# Patient Record
Sex: FEMALE | Race: BLACK OR AFRICAN AMERICAN | NOT HISPANIC OR LATINO | Employment: OTHER | URBAN - METROPOLITAN AREA
[De-identification: names, ages, dates, MRNs, and addresses within clinical notes are randomized per-mention and may not be internally consistent; named-entity substitution may affect disease eponyms.]

---

## 2018-09-24 ENCOUNTER — OFFICE VISIT CONVERTED (OUTPATIENT)
Dept: PULMONOLOGY | Facility: CLINIC | Age: 58
End: 2018-09-24
Attending: INTERNAL MEDICINE

## 2018-10-26 ENCOUNTER — OFFICE VISIT CONVERTED (OUTPATIENT)
Dept: PULMONOLOGY | Facility: CLINIC | Age: 58
End: 2018-10-26
Attending: INTERNAL MEDICINE

## 2018-11-09 ENCOUNTER — OFFICE VISIT CONVERTED (OUTPATIENT)
Dept: FAMILY MEDICINE CLINIC | Facility: CLINIC | Age: 58
End: 2018-11-09
Attending: NURSE PRACTITIONER

## 2018-11-28 ENCOUNTER — CONVERSION ENCOUNTER (OUTPATIENT)
Dept: FAMILY MEDICINE CLINIC | Facility: CLINIC | Age: 58
End: 2018-11-28

## 2018-11-28 ENCOUNTER — OFFICE VISIT CONVERTED (OUTPATIENT)
Dept: FAMILY MEDICINE CLINIC | Facility: CLINIC | Age: 58
End: 2018-11-28
Attending: NURSE PRACTITIONER

## 2019-01-28 ENCOUNTER — OFFICE VISIT CONVERTED (OUTPATIENT)
Dept: FAMILY MEDICINE CLINIC | Facility: CLINIC | Age: 59
End: 2019-01-28
Attending: NURSE PRACTITIONER

## 2019-02-08 ENCOUNTER — HOSPITAL ENCOUNTER (OUTPATIENT)
Dept: GENERAL RADIOLOGY | Facility: HOSPITAL | Age: 59
Discharge: HOME | End: 2019-02-08
Attending: INTERNAL MEDICINE

## 2019-02-14 ENCOUNTER — OFFICE VISIT CONVERTED (OUTPATIENT)
Dept: PULMONOLOGY | Facility: CLINIC | Age: 59
End: 2019-02-14
Attending: INTERNAL MEDICINE

## 2019-03-25 ENCOUNTER — OFFICE VISIT CONVERTED (OUTPATIENT)
Dept: FAMILY MEDICINE CLINIC | Facility: CLINIC | Age: 59
End: 2019-03-25
Attending: NURSE PRACTITIONER

## 2019-05-07 ENCOUNTER — OFFICE VISIT CONVERTED (OUTPATIENT)
Dept: FAMILY MEDICINE CLINIC | Facility: CLINIC | Age: 59
End: 2019-05-07
Attending: NURSE PRACTITIONER

## 2019-06-05 ENCOUNTER — OFFICE VISIT CONVERTED (OUTPATIENT)
Dept: PULMONOLOGY | Facility: CLINIC | Age: 59
End: 2019-06-05
Attending: INTERNAL MEDICINE

## 2019-06-18 ENCOUNTER — OFFICE VISIT CONVERTED (OUTPATIENT)
Dept: NEUROSURGERY | Facility: CLINIC | Age: 59
End: 2019-06-18
Attending: PHYSICIAN ASSISTANT

## 2019-06-18 ENCOUNTER — CONVERSION ENCOUNTER (OUTPATIENT)
Dept: NEUROLOGY | Facility: CLINIC | Age: 59
End: 2019-06-18

## 2019-07-31 ENCOUNTER — OFFICE VISIT CONVERTED (OUTPATIENT)
Dept: OTOLARYNGOLOGY | Facility: CLINIC | Age: 59
End: 2019-07-31
Attending: OTOLARYNGOLOGY

## 2019-08-28 ENCOUNTER — OFFICE VISIT CONVERTED (OUTPATIENT)
Dept: FAMILY MEDICINE CLINIC | Facility: CLINIC | Age: 59
End: 2019-08-28
Attending: NURSE PRACTITIONER

## 2019-09-24 ENCOUNTER — OFFICE VISIT CONVERTED (OUTPATIENT)
Dept: FAMILY MEDICINE CLINIC | Facility: CLINIC | Age: 59
End: 2019-09-24
Attending: NURSE PRACTITIONER

## 2019-10-28 ENCOUNTER — OFFICE VISIT CONVERTED (OUTPATIENT)
Dept: FAMILY MEDICINE CLINIC | Facility: CLINIC | Age: 59
End: 2019-10-28
Attending: NURSE PRACTITIONER

## 2019-12-10 ENCOUNTER — OFFICE VISIT CONVERTED (OUTPATIENT)
Dept: SURGERY | Facility: CLINIC | Age: 59
End: 2019-12-10
Attending: NURSE PRACTITIONER

## 2019-12-11 ENCOUNTER — OFFICE VISIT CONVERTED (OUTPATIENT)
Dept: PULMONOLOGY | Facility: CLINIC | Age: 59
End: 2019-12-11
Attending: INTERNAL MEDICINE

## 2020-01-03 ENCOUNTER — HOSPITAL ENCOUNTER (OUTPATIENT)
Dept: GASTROENTEROLOGY | Facility: HOSPITAL | Age: 60
Setting detail: HOSPITAL OUTPATIENT SURGERY
Discharge: HOME | End: 2020-01-03
Attending: SURGERY

## 2020-01-29 ENCOUNTER — HOSPITAL ENCOUNTER (OUTPATIENT)
Dept: FAMILY MEDICINE CLINIC | Facility: CLINIC | Age: 60
Discharge: HOME | End: 2020-01-29
Attending: NURSE PRACTITIONER

## 2020-01-29 ENCOUNTER — OFFICE VISIT CONVERTED (OUTPATIENT)
Dept: FAMILY MEDICINE CLINIC | Facility: CLINIC | Age: 60
End: 2020-01-29
Attending: NURSE PRACTITIONER

## 2020-01-31 LAB
AMOXICILLIN+CLAV SUSC ISLT: 4
AMPICILLIN SUSC ISLT: 8
AMPICILLIN+SULBAC SUSC ISLT: 4
BACTERIA UR CULT: ABNORMAL
CEFAZOLIN SUSC ISLT: <=4
CEFEPIME SUSC ISLT: <=1
CEFTAZIDIME SUSC ISLT: <=1
CEFTRIAXONE SUSC ISLT: <=1
CEFUROXIME ORAL SUSC ISLT: 4
CEFUROXIME PARENTER SUSC ISLT: 4
CIPROFLOXACIN SUSC ISLT: <=0.25
ERTAPENEM SUSC ISLT: <=0.5
GENTAMICIN SUSC ISLT: <=1
LEVOFLOXACIN SUSC ISLT: <=0.12
NITROFURANTOIN SUSC ISLT: <=16
TETRACYCLINE SUSC ISLT: <=1
TMP SMX SUSC ISLT: <=20
TOBRAMYCIN SUSC ISLT: <=1

## 2020-03-02 ENCOUNTER — OFFICE VISIT CONVERTED (OUTPATIENT)
Dept: FAMILY MEDICINE CLINIC | Facility: CLINIC | Age: 60
End: 2020-03-02
Attending: NURSE PRACTITIONER

## 2020-03-05 ENCOUNTER — OFFICE VISIT CONVERTED (OUTPATIENT)
Dept: PULMONOLOGY | Facility: CLINIC | Age: 60
End: 2020-03-05
Attending: NURSE PRACTITIONER

## 2020-03-05 ENCOUNTER — HOSPITAL ENCOUNTER (OUTPATIENT)
Dept: LAB | Facility: HOSPITAL | Age: 60
Discharge: HOME | End: 2020-03-05
Attending: NURSE PRACTITIONER

## 2020-03-05 LAB
ALBUMIN SERPL-MCNC: 4.4 G/DL (ref 3.5–5)
ALBUMIN/GLOB SERPL: 1.8 {RATIO} (ref 1.4–2.6)
ALP SERPL-CCNC: 85 U/L (ref 53–141)
ALT SERPL-CCNC: 15 U/L (ref 10–40)
ANION GAP SERPL CALC-SCNC: 16 MMOL/L (ref 8–19)
AST SERPL-CCNC: 15 U/L (ref 15–50)
BASOPHILS # BLD AUTO: 0.04 10*3/UL (ref 0–0.2)
BASOPHILS NFR BLD AUTO: 1 % (ref 0–3)
BILIRUB SERPL-MCNC: 0.19 MG/DL (ref 0.2–1.3)
BUN SERPL-MCNC: 11 MG/DL (ref 5–25)
BUN/CREAT SERPL: 11 {RATIO} (ref 6–20)
CALCIUM SERPL-MCNC: 9 MG/DL (ref 8.7–10.4)
CHLORIDE SERPL-SCNC: 105 MMOL/L (ref 99–111)
CHOLEST SERPL-MCNC: 156 MG/DL (ref 107–200)
CHOLEST/HDLC SERPL: 2.8 {RATIO} (ref 3–6)
CONV ABS IMM GRAN: 0.01 10*3/UL (ref 0–0.2)
CONV CO2: 27 MMOL/L (ref 22–32)
CONV IMMATURE GRAN: 0.2 % (ref 0–1.8)
CONV TOTAL PROTEIN: 6.9 G/DL (ref 6.3–8.2)
CREAT UR-MCNC: 0.99 MG/DL (ref 0.5–0.9)
DEPRECATED RDW RBC AUTO: 41.4 FL (ref 36.4–46.3)
EOSINOPHIL # BLD AUTO: 0.07 10*3/UL (ref 0–0.7)
EOSINOPHIL # BLD AUTO: 1.7 % (ref 0–7)
ERYTHROCYTE [DISTWIDTH] IN BLOOD BY AUTOMATED COUNT: 12.7 % (ref 11.7–14.4)
GFR SERPLBLD BASED ON 1.73 SQ M-ARVRAT: >60 ML/MIN/{1.73_M2}
GLOBULIN UR ELPH-MCNC: 2.5 G/DL (ref 2–3.5)
GLUCOSE SERPL-MCNC: 100 MG/DL (ref 65–99)
HCT VFR BLD AUTO: 37.8 % (ref 37–47)
HDLC SERPL-MCNC: 55 MG/DL (ref 40–60)
HGB BLD-MCNC: 12 G/DL (ref 12–16)
LDLC SERPL CALC-MCNC: 76 MG/DL (ref 70–100)
LYMPHOCYTES # BLD AUTO: 1.64 10*3/UL (ref 1–5)
LYMPHOCYTES NFR BLD AUTO: 39.2 % (ref 20–45)
MAGNESIUM SERPL-MCNC: 2 MG/DL (ref 1.6–2.3)
MCH RBC QN AUTO: 28.4 PG (ref 27–31)
MCHC RBC AUTO-ENTMCNC: 31.7 G/DL (ref 33–37)
MCV RBC AUTO: 89.4 FL (ref 81–99)
MONOCYTES # BLD AUTO: 0.35 10*3/UL (ref 0.2–1.2)
MONOCYTES NFR BLD AUTO: 8.4 % (ref 3–10)
NEUTROPHILS # BLD AUTO: 2.07 10*3/UL (ref 2–8)
NEUTROPHILS NFR BLD AUTO: 49.5 % (ref 30–85)
NRBC CBCN: 0 % (ref 0–0.7)
OSMOLALITY SERPL CALC.SUM OF ELEC: 297 MOSM/KG (ref 273–304)
PLATELET # BLD AUTO: 200 10*3/UL (ref 130–400)
PMV BLD AUTO: 10.7 FL (ref 9.4–12.3)
POTASSIUM SERPL-SCNC: 4.3 MMOL/L (ref 3.5–5.3)
RBC # BLD AUTO: 4.23 10*6/UL (ref 4.2–5.4)
SODIUM SERPL-SCNC: 144 MMOL/L (ref 135–147)
T4 FREE SERPL-MCNC: 1.3 NG/DL (ref 0.9–1.8)
TRIGL SERPL-MCNC: 124 MG/DL (ref 40–150)
TSH SERPL-ACNC: 1.97 M[IU]/L (ref 0.27–4.2)
VLDLC SERPL-MCNC: 25 MG/DL (ref 5–37)
WBC # BLD AUTO: 4.18 10*3/UL (ref 4.8–10.8)

## 2020-04-07 ENCOUNTER — OFFICE VISIT CONVERTED (OUTPATIENT)
Dept: PULMONOLOGY | Facility: CLINIC | Age: 60
End: 2020-04-07
Attending: INTERNAL MEDICINE

## 2020-04-17 ENCOUNTER — OFFICE VISIT CONVERTED (OUTPATIENT)
Dept: FAMILY MEDICINE CLINIC | Facility: CLINIC | Age: 60
End: 2020-04-17
Attending: NURSE PRACTITIONER

## 2020-04-29 ENCOUNTER — HOSPITAL ENCOUNTER (OUTPATIENT)
Dept: GENERAL RADIOLOGY | Facility: HOSPITAL | Age: 60
Discharge: HOME | End: 2020-04-29
Attending: NURSE PRACTITIONER

## 2020-05-14 ENCOUNTER — HOSPITAL ENCOUNTER (OUTPATIENT)
Dept: OTHER | Facility: HOSPITAL | Age: 60
Discharge: HOME | End: 2020-05-14
Attending: NURSE PRACTITIONER

## 2020-05-14 ENCOUNTER — CONVERSION ENCOUNTER (OUTPATIENT)
Dept: OTHER | Facility: HOSPITAL | Age: 60
End: 2020-05-14

## 2020-05-14 ENCOUNTER — OFFICE VISIT CONVERTED (OUTPATIENT)
Dept: PULMONOLOGY | Facility: CLINIC | Age: 60
End: 2020-05-14
Attending: NURSE PRACTITIONER

## 2020-05-16 LAB — SARS-COV-2 RNA SPEC QL NAA+PROBE: NOT DETECTED

## 2020-05-20 ENCOUNTER — HOSPITAL ENCOUNTER (OUTPATIENT)
Dept: LAB | Facility: HOSPITAL | Age: 60
Discharge: HOME | End: 2020-05-20
Attending: NURSE PRACTITIONER

## 2020-05-20 LAB
BASOPHILS # BLD AUTO: 0.04 10*3/UL (ref 0–0.2)
BASOPHILS NFR BLD AUTO: 0.8 % (ref 0–3)
CONV ABS IMM GRAN: 0.02 10*3/UL (ref 0–0.2)
CONV IMMATURE GRAN: 0.4 % (ref 0–1.8)
DEPRECATED RDW RBC AUTO: 43.5 FL (ref 36.4–46.3)
EOSINOPHIL # BLD AUTO: 0.06 10*3/UL (ref 0–0.7)
EOSINOPHIL # BLD AUTO: 1.2 % (ref 0–7)
ERYTHROCYTE [DISTWIDTH] IN BLOOD BY AUTOMATED COUNT: 13.5 % (ref 11.7–14.4)
HCT VFR BLD AUTO: 38.8 % (ref 37–47)
HGB BLD-MCNC: 12.3 G/DL (ref 12–16)
LYMPHOCYTES # BLD AUTO: 1.64 10*3/UL (ref 1–5)
LYMPHOCYTES NFR BLD AUTO: 32.4 % (ref 20–45)
MCH RBC QN AUTO: 27.8 PG (ref 27–31)
MCHC RBC AUTO-ENTMCNC: 31.7 G/DL (ref 33–37)
MCV RBC AUTO: 87.8 FL (ref 81–99)
MONOCYTES # BLD AUTO: 0.42 10*3/UL (ref 0.2–1.2)
MONOCYTES NFR BLD AUTO: 8.3 % (ref 3–10)
NEUTROPHILS # BLD AUTO: 2.88 10*3/UL (ref 2–8)
NEUTROPHILS NFR BLD AUTO: 56.9 % (ref 30–85)
NRBC CBCN: 0 % (ref 0–0.7)
PLATELET # BLD AUTO: 197 10*3/UL (ref 130–400)
PMV BLD AUTO: 10.7 FL (ref 9.4–12.3)
RBC # BLD AUTO: 4.42 10*6/UL (ref 4.2–5.4)
WBC # BLD AUTO: 5.06 10*3/UL (ref 4.8–10.8)

## 2020-05-26 ENCOUNTER — CONVERSION ENCOUNTER (OUTPATIENT)
Dept: CARDIOLOGY | Facility: CLINIC | Age: 60
End: 2020-05-26
Attending: INTERNAL MEDICINE

## 2020-05-26 ENCOUNTER — OFFICE VISIT CONVERTED (OUTPATIENT)
Dept: CARDIOLOGY | Facility: CLINIC | Age: 60
End: 2020-05-26
Attending: INTERNAL MEDICINE

## 2020-05-27 ENCOUNTER — HOSPITAL ENCOUNTER (OUTPATIENT)
Dept: LAB | Facility: HOSPITAL | Age: 60
Discharge: HOME | End: 2020-05-27
Attending: INTERNAL MEDICINE

## 2020-05-27 LAB — BNP SERPL-MCNC: 97 PG/ML (ref 0–900)

## 2020-05-28 ENCOUNTER — OFFICE VISIT CONVERTED (OUTPATIENT)
Dept: PULMONOLOGY | Facility: CLINIC | Age: 60
End: 2020-05-28
Attending: INTERNAL MEDICINE

## 2020-06-02 ENCOUNTER — HOSPITAL ENCOUNTER (OUTPATIENT)
Dept: LAB | Facility: HOSPITAL | Age: 60
Discharge: HOME | End: 2020-06-02
Attending: INTERNAL MEDICINE

## 2020-06-02 LAB
BASOPHILS # BLD AUTO: 0.03 10*3/UL (ref 0–0.2)
BASOPHILS NFR BLD AUTO: 0.6 % (ref 0–3)
CONV ABS IMM GRAN: 0.02 10*3/UL (ref 0–0.2)
CONV IMMATURE GRAN: 0.4 % (ref 0–1.8)
DEPRECATED RDW RBC AUTO: 43.5 FL (ref 36.4–46.3)
EOSINOPHIL # BLD AUTO: 0.06 10*3/UL (ref 0–0.7)
EOSINOPHIL # BLD AUTO: 1.2 % (ref 0–7)
ERYTHROCYTE [DISTWIDTH] IN BLOOD BY AUTOMATED COUNT: 13.6 % (ref 11.7–14.4)
HCT VFR BLD AUTO: 39.6 % (ref 37–47)
HGB BLD-MCNC: 12.4 G/DL (ref 12–16)
LYMPHOCYTES # BLD AUTO: 1.69 10*3/UL (ref 1–5)
LYMPHOCYTES NFR BLD AUTO: 33.5 % (ref 20–45)
MCH RBC QN AUTO: 27.4 PG (ref 27–31)
MCHC RBC AUTO-ENTMCNC: 31.3 G/DL (ref 33–37)
MCV RBC AUTO: 87.4 FL (ref 81–99)
MONOCYTES # BLD AUTO: 0.3 10*3/UL (ref 0.2–1.2)
MONOCYTES NFR BLD AUTO: 6 % (ref 3–10)
NEUTROPHILS # BLD AUTO: 2.94 10*3/UL (ref 2–8)
NEUTROPHILS NFR BLD AUTO: 58.3 % (ref 30–85)
NRBC CBCN: 0 % (ref 0–0.7)
PLATELET # BLD AUTO: 188 10*3/UL (ref 130–400)
PMV BLD AUTO: 10.7 FL (ref 9.4–12.3)
RBC # BLD AUTO: 4.53 10*6/UL (ref 4.2–5.4)
WBC # BLD AUTO: 5.04 10*3/UL (ref 4.8–10.8)

## 2020-06-04 LAB — IGE SERPL-ACNC: 44 K[IU]/ML (ref 0–24)

## 2020-07-08 ENCOUNTER — OFFICE VISIT CONVERTED (OUTPATIENT)
Dept: PULMONOLOGY | Facility: CLINIC | Age: 60
End: 2020-07-08
Attending: NURSE PRACTITIONER

## 2020-08-26 ENCOUNTER — OFFICE VISIT CONVERTED (OUTPATIENT)
Dept: FAMILY MEDICINE CLINIC | Facility: CLINIC | Age: 60
End: 2020-08-26
Attending: NURSE PRACTITIONER

## 2020-09-30 ENCOUNTER — OFFICE VISIT CONVERTED (OUTPATIENT)
Dept: FAMILY MEDICINE CLINIC | Facility: CLINIC | Age: 60
End: 2020-09-30
Attending: NURSE PRACTITIONER

## 2020-10-06 ENCOUNTER — HOSPITAL ENCOUNTER (OUTPATIENT)
Dept: GENERAL RADIOLOGY | Facility: HOSPITAL | Age: 60
Discharge: HOME | End: 2020-10-06
Attending: NURSE PRACTITIONER

## 2020-10-14 ENCOUNTER — OFFICE VISIT CONVERTED (OUTPATIENT)
Dept: PULMONOLOGY | Facility: CLINIC | Age: 60
End: 2020-10-14
Attending: NURSE PRACTITIONER

## 2021-01-11 ENCOUNTER — OFFICE VISIT CONVERTED (OUTPATIENT)
Dept: PULMONOLOGY | Facility: CLINIC | Age: 61
End: 2021-01-11
Attending: INTERNAL MEDICINE

## 2021-02-08 ENCOUNTER — OFFICE VISIT CONVERTED (OUTPATIENT)
Dept: FAMILY MEDICINE CLINIC | Facility: CLINIC | Age: 61
End: 2021-02-08
Attending: NURSE PRACTITIONER

## 2021-02-17 ENCOUNTER — HOSPITAL ENCOUNTER (OUTPATIENT)
Dept: URGENT CARE | Facility: CLINIC | Age: 61
Discharge: HOME | End: 2021-02-17
Attending: NURSE PRACTITIONER

## 2021-02-18 LAB — SARS-COV-2 RNA SPEC QL NAA+PROBE: NOT DETECTED

## 2021-02-19 LAB — BACTERIA SPEC AEROBE CULT: NORMAL

## 2021-03-08 ENCOUNTER — HOSPITAL ENCOUNTER (OUTPATIENT)
Dept: URGENT CARE | Facility: CLINIC | Age: 61
Discharge: HOME | End: 2021-03-08
Attending: FAMILY MEDICINE

## 2021-03-24 ENCOUNTER — HOSPITAL ENCOUNTER (OUTPATIENT)
Dept: GENERAL RADIOLOGY | Facility: HOSPITAL | Age: 61
Discharge: HOME | End: 2021-03-24
Attending: NURSE PRACTITIONER

## 2021-04-09 ENCOUNTER — HOSPITAL ENCOUNTER (OUTPATIENT)
Dept: LAB | Facility: HOSPITAL | Age: 61
Discharge: HOME | End: 2021-04-09
Attending: NURSE PRACTITIONER

## 2021-04-09 ENCOUNTER — OFFICE VISIT CONVERTED (OUTPATIENT)
Dept: OTOLARYNGOLOGY | Facility: CLINIC | Age: 61
End: 2021-04-09
Attending: OTOLARYNGOLOGY

## 2021-04-09 ENCOUNTER — CONVERSION ENCOUNTER (OUTPATIENT)
Dept: OTOLARYNGOLOGY | Facility: CLINIC | Age: 61
End: 2021-04-09

## 2021-04-09 LAB
ALBUMIN SERPL-MCNC: 4.4 G/DL (ref 3.5–5)
ALBUMIN/GLOB SERPL: 1.6 {RATIO} (ref 1.4–2.6)
ALP SERPL-CCNC: 84 U/L (ref 53–141)
ALT SERPL-CCNC: 21 U/L (ref 10–40)
ANION GAP SERPL CALC-SCNC: 18 MMOL/L (ref 8–19)
AST SERPL-CCNC: 19 U/L (ref 15–50)
BASOPHILS # BLD AUTO: 0.03 10*3/UL (ref 0–0.2)
BASOPHILS NFR BLD AUTO: 0.7 % (ref 0–3)
BILIRUB SERPL-MCNC: 0.44 MG/DL (ref 0.2–1.3)
BUN SERPL-MCNC: 14 MG/DL (ref 5–25)
BUN/CREAT SERPL: 13 {RATIO} (ref 6–20)
CALCIUM SERPL-MCNC: 9.3 MG/DL (ref 8.7–10.4)
CHLORIDE SERPL-SCNC: 104 MMOL/L (ref 99–111)
CHOLEST SERPL-MCNC: 191 MG/DL (ref 107–200)
CHOLEST/HDLC SERPL: 3.5 {RATIO} (ref 3–6)
CONV ABS IMM GRAN: 0.02 10*3/UL (ref 0–0.2)
CONV CO2: 25 MMOL/L (ref 22–32)
CONV IMMATURE GRAN: 0.5 % (ref 0–1.8)
CONV TOTAL PROTEIN: 7.2 G/DL (ref 6.3–8.2)
CREAT UR-MCNC: 1.1 MG/DL (ref 0.5–0.9)
DEPRECATED RDW RBC AUTO: 39.4 FL (ref 36.4–46.3)
EOSINOPHIL # BLD AUTO: 0.02 10*3/UL (ref 0–0.7)
EOSINOPHIL # BLD AUTO: 0.5 % (ref 0–7)
ERYTHROCYTE [DISTWIDTH] IN BLOOD BY AUTOMATED COUNT: 12.5 % (ref 11.7–14.4)
GFR SERPLBLD BASED ON 1.73 SQ M-ARVRAT: >60 ML/MIN/{1.73_M2}
GLOBULIN UR ELPH-MCNC: 2.8 G/DL (ref 2–3.5)
GLUCOSE SERPL-MCNC: 93 MG/DL (ref 65–99)
HCT VFR BLD AUTO: 39 % (ref 37–47)
HDLC SERPL-MCNC: 54 MG/DL (ref 40–60)
HGB BLD-MCNC: 12.9 G/DL (ref 12–16)
LDLC SERPL CALC-MCNC: 120 MG/DL (ref 70–100)
LYMPHOCYTES # BLD AUTO: 1.2 10*3/UL (ref 1–5)
LYMPHOCYTES NFR BLD AUTO: 28 % (ref 20–45)
MCH RBC QN AUTO: 28.4 PG (ref 27–31)
MCHC RBC AUTO-ENTMCNC: 33.1 G/DL (ref 33–37)
MCV RBC AUTO: 85.9 FL (ref 81–99)
MONOCYTES # BLD AUTO: 0.35 10*3/UL (ref 0.2–1.2)
MONOCYTES NFR BLD AUTO: 8.2 % (ref 3–10)
NEUTROPHILS # BLD AUTO: 2.66 10*3/UL (ref 2–8)
NEUTROPHILS NFR BLD AUTO: 62.1 % (ref 30–85)
NRBC CBCN: 0 % (ref 0–0.7)
OSMOLALITY SERPL CALC.SUM OF ELEC: 294 MOSM/KG (ref 273–304)
PLATELET # BLD AUTO: 245 10*3/UL (ref 130–400)
PMV BLD AUTO: 10 FL (ref 9.4–12.3)
POTASSIUM SERPL-SCNC: 4.6 MMOL/L (ref 3.5–5.3)
RBC # BLD AUTO: 4.54 10*6/UL (ref 4.2–5.4)
SODIUM SERPL-SCNC: 142 MMOL/L (ref 135–147)
TRIGL SERPL-MCNC: 84 MG/DL (ref 40–150)
TSH SERPL-ACNC: 0.35 M[IU]/L (ref 0.27–4.2)
VLDLC SERPL-MCNC: 17 MG/DL (ref 5–37)
WBC # BLD AUTO: 4.28 10*3/UL (ref 4.8–10.8)

## 2021-05-07 ENCOUNTER — OFFICE VISIT CONVERTED (OUTPATIENT)
Dept: OTOLARYNGOLOGY | Facility: CLINIC | Age: 61
End: 2021-05-07
Attending: OTOLARYNGOLOGY

## 2021-05-10 NOTE — PROCEDURES
"   Procedure Note      Patient Name: Judy Ramey   Patient ID: [id number]   Sex: Female   YOB: 1960    Primary Care Provider: Milton ISAACS   Referring Provider: Milton ISAACS    Visit Date: May 26, 2020    Provider: Xavi Thomas MD   Location: [facility]   Location Address: [address] [place] [address], [address]   KANA Mahmood  [id number]   Location Phone: [phone]          FINAL REPORT   TRANSTHORACIC ECHOCARDIOGRAM REPORT    Diagnosis: Shortness of breath   Height: 5'6\" Weight: 210 B/P: 132/72 BSA: 2.0   Tech: BNS   MEASUREMENTS:  RVID (Diastole) : RVID. (NORMAL: 0.7 to 2.4 cm max)   LVID (Systole): 3.0 cm (Diastole): 4.3 cm. (NORMAL: 3.7 - 5.4 cm)   Posterior Wall Thickness (Diastole): 1.0 cm. (NORMAL: 0.8 - 1.1 cm)   Septal Thickness (Diastole): 1.1 cm. (NORMAL: 0.7 - 1.2 cm)   LAID (Systole): 3.9 cm. (NORMAL: 1.9 - 3.8 cm)   Aortic Root Diameter (Diastole): 2.9 cm. (NORMAL: 2.0 - 3.7 cm)   MITRL.VLV.AUSTIN.D.D.R: 80 mm/sec-150 mm/sec   DOPPLER: Continuous-wave, pulse-wave, and color-flow examination of the mitral, aortic, and tricuspid valves was performed. There is trace mitral regurgitation. No significant stenosis was identified. Doppler flow velocities were normal. E/A ratio is 0.9. DT= 259 msec. IVRT is 95 msec. E/E' is 8.   COMMENTS:  The patient underwent 2-D, M-Mode, and Doppler examination, including pulse-wave, continuous-wave, and color-flow Doppler analysis; the study is technically adequate. The following was observed:   FINDINGS:  AORTIC VALVE: Appeared to be normal. Trileaflet with central closure point. No evidence of any obstruction. No regurgitation.   MITRAL VALVE: Appeared to be normal. No evidence of any obstruction. No regurgitation.   TRICUSPID VALVE: Appeared to be normal.   PULMONIC VALVE: Not well seen.   LEFT ATRIUM: Appeared to be borderline enlarged. No intracavity masses or clots seen. LA volume index is 28 mL/m2.   AORTIC ROOT: Appeared " to be normal in size.   LEFT VENTRICLE: Appeared to have an overall normal left ventricular systolic function with a normal EF of 55% with borderline left ventricular hypertrophy. No focal wall motion abnormalities.   RIGHT ATRIUM: Appeared to be normal; no obvious evidence of intracavity masses or clots.   RIGHT VENTRICLE: Normal size and function.   PERICARDIUM: Unremarkable. No evidence of effusion.   INFERIOR VENA CAVA: Diameter is 1.6 cm.   Fax: 5/26/2020      CONCLUSION:  1.  Normal ejection fraction of 55%.   2.  Borderline left ventricular hypertrophy.  3.  Borderline left atrial enlargement.   4.  Trace mitral regurgitation.      MD KATIA Mackenzie/jeffery    This note was transcribed by Leah Chopra.  jeffery/katia  The above service was transcribed by Leah Chopra, and I attest to the accuracy of the note.                   Electronically Signed by: Shi Chopra-, Other -Author on May 29, 2020 03:34:32 PM  Electronically Co-signed by: Xavi Thomas MD -Reviewer on June 6, 2020 02:15:31 PM

## 2021-05-10 NOTE — H&P
History and Physical      Patient Name: Judy Ramey   Patient ID: [id number]   Sex: Female   YOB: 1960    Primary Care Provider: Milton ISAACS   Referring Provider: Milton ISAACS    Visit Date: May 26, 2020    Provider: Xavi Thomas MD   Location: [facility]   Location Address: [address] [place] [address], [facility]   KANA Mahmood  [id number]   Location Phone: [phone]          Chief Complaint  · Shortness of breath  · Palpitations      History Of Present Illness  Consult requested by: Milton ISAACS   Judy Ramey is a 59-year-old female with rheumatoid arthritis and hypothyroidism who has been having issues with intermittent heart palpitations as well as shortness of breath especially with any type of exertion. The patient has no lower extremity edema, but has had some PND symptoms. She reports at times intermittent atypical chest pain symptoms, but no exertional angina.   PAST MEDICAL HISTORY: Significant for asthma, rheumatoid arthritis, and hypothyroidism.   FAMILY MEDICAL HISTORY: Nonsignificant for premature coronary atherosclerotic disease.   PSYCHOSOCIAL HISTORY: The patient is . Positive for history of mood change or depression. She does not smoke, uses alcohol rarely, and has caffeine daily.   CURRENT MEDICATIONS: include Albuterol 2.5 mg p.r.n.; Advair inhaler; Diclofenac 50 mg daily; Dymista; Levothyroxine 125 mcg daily; Os-Erik; Prempro; Spiriva; multivitamin. Loratadine 10 mg daily; Montelukast 10 mg daily. The dosage and frequency of the medications were reviewed with the patient.   ALLERGIES: Codeine.       Review of Systems  · Constitutional  o Admits  o : fatigue, good general health lately  o Denies  o : recent weight changes   · Eyes  o Admits  o : double vision, blurred vision  · HENT  o Admits  o : chronic sinus problem  o Denies  o : hearing loss or ringing, swollen glands in neck  · Cardiovascular  o Admits  o : chest pain,  "palpitations (fast, fluttering, or skipping beats), shortness of breath while walking or lying flat  o Denies  o : swelling (feet, ankles, hands)  · Respiratory  o Admits  o : chronic or frequent cough, asthma or wheezing  o Denies  o : COPD  · Gastrointestinal  o Denies  o : ulcers, nausea or vomiting  · Neurologic  o Admits  o : lightheaded or dizzy  o Denies  o : stroke, headaches  · Musculoskeletal  o Admits  o : joint pain, back pain  · Endocrine  o Admits  o : thyroid disease, excessive thirst or urination  o Denies  o : diabetes, heat or cold intolerance  · Heme-Lymph  o Denies  o : bleeding or bruising tendency, anemia      Vitals  Date Time BP Position Site L\R Cuff Size HR RR TEMP (F) WT  HT  BMI kg/m2 BSA m2 O2 Sat HC       05/26/2020 09:23 /72 Sitting    68 - R   210lbs 0oz 5'  6\" 33.89 2.11           Physical Examination  · Constitutional  o Appearance  o : Awake, alert, in no acute distress.   · Head and Face  o HEENT  o : PERRLA.  · Eyes  o Conjunctivae  o : Normal.  · Ears, Nose, Mouth and Throat  o Oral Cavity  o :   § Oral Mucosa  § : Normal.  · Neck  o Inspection/Palpation  o : No JVD.  · Respiratory  o Respiratory  o : Chest is symmetrical. Lung fields are clear. No rhonchi or wheezes heard.  · Cardiovascular  o Heart  o :   § Auscultation of Heart  § : S1, S2 are normal. Regular rate and rhythm without murmurs, gallops, or rubs.  o Peripheral Vascular System  o :   § Extremities  § : Nails normal. No clubbing or cyanosis. Femoral pulses adequate. Pedal pulses adequate. No peripheral edema.  · Gastrointestinal  o Abdominal Examination  o : Abdomen soft. No masses. No guarding or rigidity. No hepatosplenomegaly. Bowel sounds normal.  · Musculoskeletal  o General  o :   § General Musculoskeletal  § : Muscle tone and strength were normal.  · Skin and Subcutaneous Tissue  o General Inspection  o : Unremarkable.  · EKG  o Results  o : Normal sinus rhythm with possible right atrial enlargement. "   · Labs  o Labs  o : Hemoglobin 12.3, creatinine 0.99, TSH 1.9.      Echocardiogram shows normal ejection fraction of 55% with borderline left ventricular hypertrophy and borderline left atrial enlargement.           Assessment     ASSESSMENT AND PLAN:  1.  Shortness of breath.  Patient with symptoms primarily on exertion. Echocardiogram today shows a        preserved ejection fraction with no significant evidence of systolic or diastolic congestive heart failure.  Will        check a proBNP level, and if this is within normal range, suspect these symptoms are more likely pulmonary        in nature.   2.  Heart palpitations, most likely PACs or PVCs, benign in nature given normal echocardiogram and underlying        chronic lung disease.  Will get 24-hour Holter monitor for assessment of dysrhythmias.        MD KATIA Mackenzie/jeffery    This note was transcribed by Leah Chopra.  pap/katia  The above service was transcribed by Leah Chopra, and I attest to the accuracy of the note.  KATIA             Electronically Signed by: Shi Chopra-, Other -Author on May 28, 2020 10:38:19 AM  Electronically Co-signed by: Xavi Thomas MD -Reviewer on June 6, 2020 02:11:08 PM

## 2021-05-10 NOTE — PROCEDURES
Procedure Note      Patient Name: Judy Ramey   Patient ID: [id number]   Sex: Female   YOB: 1960    Primary Care Provider: Milton ISAACS   Referring Provider: Milton ISAACS    Visit Date: May 26, 2020    Provider: Xavi Thomas MD   Location: [facility]   Location Address: [address] [place] [address], [facility]   KANA Mahmood  [id number]   Location Phone: [phone]          FINAL REPORT   HOLTER MONITOR REPORT  Date: 05/26/2020   Indication: Palpitations      The patient underwent a 24-hour Holter monitor.  The average heart rate was 66  beats per minute.  The maximum heart rate was 88 beats per minute.  The minimum heart rate was 54 beats per minute.  The baseline rhythm was normal sinus rhythm. There were PVCs, 20 beats total.  There were also PACs totaling 766 beats, or 0.8% of all beats.  There was a four-beat run of an SVT consistent with ectopic atrial tachycardia, rate in the low 100s.      IMPRESSION:     1.  Normal average heart rate of 66 beats per minute based on normal sinus rhythm.   2.  Low frequency PVCs totaling 20 beats.   3.  Low frequency PACs totaling 766 beats or 0.8% of all the beats.   4.  Four-beat run of an SVT in the low-salt diet 100s consistent with ectopic atrial tachycardia.        MD KATIA Mackenzie/jeffery    This note was transcribed by Leah Chopra.  jeffery/katia  The above service was transcribed by Leah Chopra, and I attest to the accuracy of the note.  KATIA                 Electronically Signed by: Shi Chopra-, Other -Author on June 8, 2020 11:38:31 AM  Electronically Co-signed by: Xavi Thomas MD -Reviewer on June 18, 2020 05:09:39 PM

## 2021-05-12 NOTE — PROGRESS NOTES
Progress Note      Patient Name: Judy Ramey   Patient ID: [id number]   Sex: Female   YOB: 1960    Primary Care Provider: Milton ISAACS   Referring Provider: Milton ISAACS    Visit Date: April 17, 2020    Provider: FERNY Martin   Location: [facility]   Location Address: [address] [place] [address], [address] [place] [address]  [id number]   Location Phone: [phone]          Chief Complaint  · Left sided pelvic pain for 1 week  · Patient said she had a Hysterectomy in 1/2020  · Patient is having difficulty swallowing  · Patient does have a thyroid condition  · Difficulty swallowing for 1-2 months on and off  · Last pap 2/2020- normal      History Of Present Illness  Judy Ramey is a 59 year old /Black female who presents for evaluation and treatment of:      She presents to the office today with complaints of left-sided leg pain.  She states that she did have a hysterectomy in January.  She states that the pain feels like it is on left anterior pelvic area and radiates to the outside of her vagina near her left sided labia.  Patient states that the pain increases with palpation.  Patient denies any pain with urination, pain with bowel movements or pain with intercourse.  Patient denies any vaginal discharge or bleeding.  Patient denies any incontinence of stool or bowel.  She denies any visible lesions  Patient does request a referral to GYN.    She also states that she has been having a sensation that something stuck in her throat.  She states that she has no difficulty swallowing or eating.  She denies any acid reflux or postnasal drip.  Patient states that this sensation has been there for approximately a month.       Past Medical History  Change in voice; Cough; Dry mouth; GERD (gastroesophageal reflux disease); Hoarseness of voice; Hypothyroidism; Low back pain         Past Surgical History  Colonoscopy; Uterine ablation         Medication  "List  albuterol sulfate 2.5 mg /3 mL (0.083 %) inhalation solution for nebulization; Satin Sinus Rinse Refill sinus irrigation packet; Claritin 10 mg oral tablet; Detrol LA 2 mg oral capsule,extended release 24hr; diclofenac sodium 75 mg oral tablet,delayed release (DR/EC); Dymista 137-50 mcg/spray nasal spray,non-aerosol; Lexapro 10 mg oral tablet; Lubricating Plus 0.5 % ophthalmic (eye) dropperette; magnesium citrate oral solution; omeprazole 40 mg oral capsule,delayed release(DR/EC); Oysco 500/D 500 mg(1,250mg) -200 unit oral tablet; Prempro 0.45-1.5 mg oral tablet; ProAir HFA 90 mcg/actuation inhalation HFA aerosol inhaler; Singulair 10 mg oral tablet; Synthroid 125 mcg oral tablet; terbinafine HCl 250 mg oral tablet; Voltaren 1 % topical gel         Allergy List  Codiene         Family Medical History  Stroke; Heart Disease; Diabetes; Family history of Arthritis; Family history of cancer; Family history of stroke; Family history of heart disease; Family history of diabetes mellitus         Social History  Alcohol (Never); lives with spouse; ; Tobacco (Former); Unemployed         Immunizations  Name Date Admin   Influenza          Review of Systems  · Constitutional  o Denies  o : fever, fatigue, weight loss, weight gain  · Cardiovascular  o Denies  o : lower extremity edema, claudication, chest pressure, palpitations  · Respiratory  o Denies  o : shortness of breath, wheezing, cough, hemoptysis, dyspnea on exertion  · Gastrointestinal  o Denies  o : nausea, vomiting, diarrhea, constipation, abdominal pain      Vitals  Date Time BP Position Site L\R Cuff Size HR RR TEMP (F) WT  HT  BMI kg/m2 BSA m2 O2 Sat        04/17/2020 09:48 /74 Sitting    85 - R 18 99 210lbs 0oz 5'  6\" 33.89 2.11 95 %          Physical Examination  · Constitutional  o Appearance  o : well-nourished, in no acute distress  · Ears, Nose, Mouth and Throat  o Ears  o :   § External Ears  § : external auditory canal appearance within " normal limits, no discharge present  § Otoscopic Examination  § : tympanic membrane appearance within normal limits bilaterally, cerumen not present  o Nose  o :   § External Nose  § : appearance normal  § Intranasal Exam  § : mucosa within normal limits, vestibules normal, no intranasal lesions present, septum midline, sinuses non tender to palpation  § Nasopharynx  § : no discharge present  o Oral Cavity  o :   § Oral Mucosa  § : oral mucosa normal  § Lips  § : lip appearance normal  § Teeth  § : normal dentation for age  o Throat  o :   § Oropharynx  § : no inflammation or lesions present, tonsils within normal limits  · Neck  o Inspection/Palpation  o : normal appearance, no masses or tenderness, trachea midline  o Range of Motion  o : cervical range of motion within normal limits  o Thyroid  o : gland size normal, nontender, no nodules or masses present on palpation  · Respiratory  o Respiratory Effort  o : breathing unlabored  o Inspection of Chest  o : normal appearance  o Auscultation of Lungs  o : normal breath sounds throughout inspiration and expiration  · Cardiovascular  o Heart  o :   § Auscultation of Heart  § : regular rate and rhythm, no murmurs, gallops or rubs  o Peripheral Vascular System  o :   § Extremities  § : no clubbing or edema  · Gastrointestinal  o Abdominal Examination  o : left lower tenderness noted to groin, tone normal without rigidity or guarding, no masses present, bowel sounds present in all four quadrants  · Genitourinary  o External Genitalia  o : no inflammation, no lesions present, no masses present, no evidence of trauma  o Anus  o : no inflammation or lesions present  · Skin and Subcutaneous Tissue  o General Inspection  o : no rashes or lesions present, no areas of discoloration  o Body Hair  o : hair normal for age, general body hair distribution normal for age  o Digits and Nails  o : no clubbing, cyanosis, deformities or edema present, normal appearing  nails  · Neurologic  o Mental Status Examination  o :   § Orientation  § : grossly oriented to person, place and time  o Gait and Station  o : normal gait, able to stand without difficulty  · Psychiatric  o Judgement and Insight  o : judgment and insight intact  o Mood and Affect  o : mood normal, affect appropriate  o Presence of Abnormal Thoughts  o : no hallucinations, no delusions present, no psychotic thoughts          Results  · In-Office Procedures  o Lab procedure  § IOP - Urinalysis without Microscopy (Clinitek) [facility] ([id number])   § Color Ur: Yellow   § Clarity Ur: Cloudy   § Glucose Ur Ql Strip: Negative   § Bilirub Ur Ql Strip: Negative   § Ketones Ur Ql Strip: Negative   § Sp Gr Ur Qn: 1.025   § Hgb Ur Ql Strip: Negative   § pH Ur-LsCnc: 6.0   § Prot Ur Ql Strip: Negative   § Urobilinogen Ur Strip-mCnc: 0.2 E.U./dL   § Nitrite Ur Ql Strip: Negative   § WBC Est Ur Ql Strip: Negative       Assessment  · Hypothyroidism     244.9/E03.9  · Globus sensation     306.4/R09.89  · Left Pelvic pain     NOCODE/R10.2      Plan  · Orders  o ACO-39: Current medications updated and reviewed () - - 04/17/2020  o ACO-14: Influenza immunization administered or previously received () - - 04/17/2020  o Thyroid Ultrasound. (09581) - - 04/17/2020  o OB/GYN CONSULTATION (OBGYN) - - 04/17/2020  o Ovarian ultrasound (76056) - - 04/17/2020  · Medications  o Dulcolax (bisacodyl) 5 mg oral tablet,delayed release (DR/EC)   SIG: TAKE 3 TABLETS @ 8AM DAY BEFORE PROCEDURE   DISP: (3) tablets with 0 refills  Discontinued on 04/17/2020     o Medications have been Reconciled  o Transition of Care or Provider Policy  · Instructions  o Patient was educated/instructed on their diagnosis, treatment and medications prior to discharge from the clinic today.  o Time spent with the patient was minutes, more than 50% face to face.  o Electronically Identified Patient Education Materials Provided Electronically  · Disposition  o Call or Return  if symptoms worsen or persist.  o Follow up as scheduled  o Care Transition  o CARINE Sent            Electronically Signed by: FERNY Martin -Author on April 17, 2020 12:05:32 PM

## 2021-05-13 NOTE — PROGRESS NOTES
Progress Note      Patient Name: Judy Ramey   Patient ID: [id number]   Sex: Female   YOB: 1960    Primary Care Provider: Milton ISAACS   Referring Provider: Milton ISAACS    Visit Date: September 30, 2020    Provider: FERNY Martin   [place] [facility]   [place] Address[place] [address] [place] [address], [address] [place] [address]  [id number]   Location Phone: [phone]          Chief Complaint  · C/O allergies, coughing, shortness of breath      History Of Present Illness  Judy Ramey is a 60 year old /Black female who presents for evaluation and treatment of:      Patient presents to the office today for complaints of cough congestion.  Patient states that she has been using her nebulizer more frequently for her asthma because of the shortness of breath associated with the cough.  He denies any fevers at this time.  She denies any sore throat, headaches or earaches.  Patient denies any nausea vomiting or diarrhea.       Past Medical History  Disease Name Date Onset Notes   Change in voice --  --    Cough --  --    Dry mouth --  --    GERD (gastroesophageal reflux disease) 08/28/2019 --    Hoarseness of voice --  --    Hypothyroidism 08/28/2019 --    Low back pain --  --          Past Surgical History  Procedure Name Date Notes   Colonoscopy --  --    Uterine ablation --  --          Medication List  Name Date Started Instructions   albuterol sulfate 2.5 mg /3 mL (0.083 %) inhalation solution for nebulization 08/26/2020 inhale 3 milliliters (2.5 mg) by nebulization route 3 times per day as needed   Claritin 10 mg oral tablet 08/26/2020 take 1 tablet (10 mg) by oral route once daily   cyclobenzaprine 10 mg oral tablet 08/26/2020 take 1 tablet by oral route 2 times a day as needed   Detrol LA 2 mg oral capsule,extended release 24hr 08/26/2020 take 1 capsule (2 mg) by oral route once daily for 90 days   diclofenac sodium 75 mg oral  tablet,delayed release (DR/EC) 08/26/2020 take 1 tablet (75 mg) by oral route 2 times per day   Dupixent 200 mg/1.14 mL subcutaneous syringe  --    Dymista 137-50 mcg/spray nasal spray,non-aerosol 08/26/2020 spray 1 spray in each nostril by intranasal route 2 times per day   Lexapro 10 mg oral tablet 08/26/2020 take 1 tablet (10 mg) by oral route once daily for 90 days   lidocaine 5 % topical adhesive patch,medicated 09/15/2020 apply 1 patch by transdermal route once daily (May wear up to 12hours.) for 90 days   Lubricating Plus 0.5 % ophthalmic (eye) dropperette 01/29/2020 instill 1 drop by ophthalmic route 2 times a day   omeprazole 40 mg oral capsule,delayed release(DR/EC) 08/26/2020 take 1 capsule (40 mg) by oral route once daily before a meal for 90 days   Oysco 500/D 500 mg(1,250mg) -200 unit oral tablet 08/26/2020 take 1 tablet by oral route daily for 90 days   Prempro 0.45-1.5 mg oral tablet 08/26/2020 take 1 tablet by oral route once daily for 90 days   ProAir HFA 90 mcg/actuation inhalation HFA aerosol inhaler 08/26/2020 inhale 1 - 2 puffs (90 - 180 mcg) by inhalation route every 6 hours as needed for 90 days   Singulair 10 mg oral tablet 08/26/2020 take 1 tablet (10 mg) by oral route once daily in the evening   Synthroid 125 mcg oral tablet 08/26/2020 take 1 tablet (125 mcg) by oral route once daily for 90 days   Toprol XL 25 mg oral tablet extended release 24 hr 06/10/2020 take 1 tablet (25 mg) by oral route once daily   Voltaren 1 % topical gel 08/26/2020 apply 2 gram to the affected area(s) by topical route 4 times per day         Allergy List  Allergen Name Date Reaction Notes   Codiene --  --  --        Allergies Reconciled  Family Medical History  Disease Name Relative/Age Notes   Stroke  --    Heart Disease  --    Diabetes  --    Family history of Arthritis Father/  Mother/   Mother; Father   Family history of cancer Sister/   Sister   Family history of stroke Father/   Father   Family history of  "heart disease Father/  Mother/   Mother; Father   Family history of diabetes mellitus Father/  Mother/   Mother; Father         Social History  Finding Status Start/Stop Quantity Notes   Alcohol Never --/-- --  06/18/2019 - does not drink, less than 1 drink per day   lives with spouse --  --/-- --  --     --  --/-- --  --    Tobacco Former --/-- --  former smoker, smoked 0.5 year   Unemployed --  --/-- --  --          Immunizations  NameDate Admin Mfg Trade Name Lot Number Route Inj VIS Given VIS Publication   [id number]/30/2020 [facility] Fluzone Quadrivalent [id number] IM  09/30/2020 08/15/2019   Comments:          Review of Systems  · Constitutional  o Admits  o : fatigue  o Denies  o : night sweats  · Eyes  o Denies  o : double vision, blurred vision  · HENT  o Denies  o : vertigo, recent head injury  · Breasts  o Denies  o : abnormal changes in breast size, additional breast symptoms except as noted in the HPI  · Cardiovascular  o Denies  o : chest pain, irregular heart beats  · Respiratory  o Admits  o : shortness of breath, cough  o Denies  o : productive cough  · Gastrointestinal  o Denies  o : nausea, vomiting  · Genitourinary  o Denies  o : dysuria, urinary retention  · Integument  o Denies  o : hair growth change, new skin lesions  · Neurologic  o Denies  o : altered mental status, seizures  · Musculoskeletal  o Denies  o : joint swelling, limitation of motion  · Endocrine  o Denies  o : cold intolerance, heat intolerance  · Heme-Lymph  o Denies  o : petechiae, lymph node enlargement or tenderness  · Allergic-Immunologic  o Denies  o : frequent illnesses      Vitals  Date Time BP Position Site L\R Cuff Size HR RR TEMP (F) WT  HT  BMI kg/m2 BSA m2 O2 Sat FR L/min FiO2 HC       09/30/2020 02:43 /68 Sitting    77 - R  97.4 210lbs 0oz 5'  6\" 33.89 2.11 96 %            Physical Examination  · Constitutional  o Appearance  o : well-nourished, in no acute distress  · Eyes  o Conjunctivae  o : conjunctivae " normal  o Sclerae  o : sclerae white  o Pupils and Irises  o : pupils equal and round  o Eyelids/Ocular Adnexae  o : eyelid appearance normal, no exudates present  · Ears, Nose, Mouth and Throat  o Ears  o :   § External Ears  § : external auditory canal appearance within normal limits, no discharge present  § Otoscopic Examination  § : fluid bubbles present behind tympanic membrane   o Nose  o :   § External Nose  § : appearance normal  § Intranasal Exam  § : mucosa within normal limits, vestibules normal, no intranasal lesions present, septum midline, sinuses non tender to palpation  § Nasopharynx  § : no discharge present  o Oral Cavity  o :   § Oral Mucosa  § : oral mucosa normal  § Lips  § : lip appearance normal  § Teeth  § : normal dentation for age  o Throat  o :   § Oropharynx  § : no inflammation or lesions present, tonsils within normal limits  · Neck  o Inspection/Palpation  o : normal appearance, no masses or tenderness, trachea midline  o Range of Motion  o : cervical range of motion within normal limits  o Thyroid  o : gland size normal, nontender, no nodules or masses present on palpation  · Respiratory  o Respiratory Effort  o : breathing unlabored  o Inspection of Chest  o : normal appearance  o Auscultation of Lungs  o : normal breath sounds throughout inspiration and expiration  · Cardiovascular  o Heart  o :   § Auscultation of Heart  § : regular rate and rhythm, no murmurs, gallops or rubs  o Peripheral Vascular System  o :   § Extremities  § : no clubbing or edema  · Skin and Subcutaneous Tissue  o General Inspection  o : no rashes or lesions present, no areas of discoloration  o Body Hair  o : hair normal for age, general body hair distribution normal for age  o Digits and Nails  o : no clubbing, cyanosis, deformities or edema present, normal appearing nails  · Neurologic  o Mental Status Examination  o :   § Orientation  § : grossly oriented to person, place and time  o Gait and Station  o :  normal gait, able to stand without difficulty  · Psychiatric  o Judgement and Insight  o : judgment and insight intact  o Mood and Affect  o : mood normal, affect appropriate  o Presence of Abnormal Thoughts  o : no hallucinations, no delusions present, no psychotic thoughts          Assessment  · Need for influenza vaccination     V04.81/Z23  · Allergic rhinitis due to allergen     477.9/J30.9  · Asthma     493.90/J45.909  · Bronchitis, acute     466.0/J20.9  · Cough     786.2/R05  · Hypothyroidism     244.9/E03.9  · Eustachian tube dysfunction, left     381.81/H69.82      Plan  · Orders  o Immunization Admin Fee (Single) ([facility]) (36203) - V04.81/Z23 - 09/30/2020  o Fluzone Quadrivalent Vaccine, age 6 months + (91835) - V04.81/Z23 - 09/30/2020   Vaccine - Influenza; Dose: 0.5; Site: Left Upper Arm; Route: Intramuscular; Date: 09/30/2020 15:12:00; Exp: 06/30/2021; Lot: [id number]; Mfg: sanofi pasteur; TradeName: Fluzone Quadrivalent; Administered By: Iliana Painter MA; Comment: N/A  o ACO-39: Current medications updated and reviewed (, 1159F) - - 09/30/2020  · Medications  o Augmentin 875-125 mg oral tablet   SIG: take 1 tablet by oral route every 12 hours for 10 days   DISP: (20) Tablet with 0 refills  Prescribed on 09/30/2020     o Medications have been Reconciled  o Transition of Care or Provider Policy  · Instructions  o Rest. Increase Fluids.  o Patient was educated/instructed on their diagnosis, treatment and medications prior to discharge from the clinic today.  o Minutes spent with patient including greater than 50% in Education/Counseling/Care Coordination.  o Time spent with the patient was minutes, more than 50% face to face.  o Electronically Identified Patient Education Materials Provided Electronically  · Disposition  o Call or Return if symptoms worsen or persist.     She does state that she has not taken her Dymista way it was prescribed.  She is currently taking her Claritin and Singulair.  I did  encourage her to take the Dymista as prescribed.  Paper prescription for Augmentin was given to the patient due to her history of bronchitis as well his asthma.             Electronically Signed by: FERNY Martin -Author on September 30, 2020 04:41:43 PM

## 2021-05-13 NOTE — PROGRESS NOTES
Progress Note      Patient Name: Judy Ramey   Patient ID: [id number]   Sex: Female   YOB: 1960    Primary Care Provider: Milton ISAACS   Referring Provider: Milton ISAACS    Visit Date: August 26, 2020    Provider: FERNY Martin   Location: [facility]   Location Address: [address] [place] [address], [address] [place] [address]  [id number]   Location Phone: [phone]          Chief Complaint  · Routine f/u  · Poss TMJ  · Medication refills      History Of Present Illness  Judy Ramey is a 59 year old /Black female who presents for evaluation and treatment of:      Patient presents to the office today with complaints of right jaw pain.  She states that the pain increases when she is chewing.  States that it does not hurt all the time it is intermittent.  She also states that her jaw has been popping somewhat.  She denies grinding her teeth in her sleep that she is aware of.  Patient does state that she will be seeing a new dentist soon.    Patient also request medication refills            Past Medical History  Disease Name Date Onset Notes   Change in voice --  --    Cough --  --    Dry mouth --  --    GERD (gastroesophageal reflux disease) 08/28/2019 --    Hoarseness of voice --  --    Hypothyroidism 08/28/2019 --    Low back pain --  --          Past Surgical History  Procedure Name Date Notes   Colonoscopy --  --    Uterine ablation --  --          Medication List  Name Date Started Instructions   albuterol sulfate 2.5 mg /3 mL (0.083 %) inhalation solution for nebulization 08/26/2020 inhale 3 milliliters (2.5 mg) by nebulization route 3 times per day as needed   Claritin 10 mg oral tablet 08/26/2020 take 1 tablet (10 mg) by oral route once daily   Detrol LA 2 mg oral capsule,extended release 24hr 08/26/2020 take 1 capsule (2 mg) by oral route once daily for 90 days   diclofenac sodium 75 mg oral tablet,delayed release (DR/EC) 08/26/2020 take 1 tablet (75  mg) by oral route 2 times per day   Dymista 137-50 mcg/spray nasal spray,non-aerosol 08/26/2020 spray 1 spray in each nostril by intranasal route 2 times per day   Lexapro 10 mg oral tablet 08/26/2020 take 1 tablet (10 mg) by oral route once daily for 90 days   Lubricating Plus 0.5 % ophthalmic (eye) dropperette 01/29/2020 instill 1 drop by ophthalmic route 2 times a day   omeprazole 40 mg oral capsule,delayed release(DR/EC) 08/26/2020 take 1 capsule (40 mg) by oral route once daily before a meal for 90 days   Oysco 500/D 500 mg(1,250mg) -200 unit oral tablet 08/26/2020 take 1 tablet by oral route daily for 90 days   Prempro 0.45-1.5 mg oral tablet 08/26/2020 take 1 tablet by oral route once daily for 90 days   ProAir HFA 90 mcg/actuation inhalation HFA aerosol inhaler 08/26/2020 inhale 1 - 2 puffs (90 - 180 mcg) by inhalation route every 6 hours as needed for 90 days   Singulair 10 mg oral tablet 08/26/2020 take 1 tablet (10 mg) by oral route once daily in the evening   Synthroid 125 mcg oral tablet 08/26/2020 take 1 tablet (125 mcg) by oral route once daily for 90 days   Toprol XL 25 mg oral tablet extended release 24 hr 06/10/2020 take 1 tablet (25 mg) by oral route once daily   Voltaren 1 % topical gel 08/26/2020 apply 2 gram to the affected area(s) by topical route 4 times per day         Allergy List  Allergen Name Date Reaction Notes   Codiene --  --  --        Allergies Reconciled  Family Medical History  Disease Name Relative/Age Notes   Stroke  --    Heart Disease  --    Diabetes  --    Family history of Arthritis Father/  Mother/   Mother; Father   Family history of cancer Sister/   Sister   Family history of stroke Father/   Father   Family history of heart disease Father/  Mother/   Mother; Father   Family history of diabetes mellitus Father/  Mother/   Mother; Father         Social History  Finding Status Start/Stop Quantity Notes   Alcohol Never --/-- --  06/18/2019 - does not drink, less than 1 drink  "per day   lives with spouse --  --/-- --  --     --  --/-- --  --    Tobacco Former --/-- --  former smoker, smoked 0.5 year   Unemployed --  --/-- --  --          Immunizations  NameDate Admin Mfg Trade Name Lot Number Route Inj VIS Given VIS Publication   [id number]/28/2019 [facility] Fluzone Quadrivalent [id number] IM LD 10/28/2019    Comments: Patient tolerated injection well.         Review of Systems  · Constitutional  o Denies  o : fever, weight gain, weight loss, malaise/fatigue  · Eyes  o Denies  o : diplopia, recent changes, blurred vision, redness of eye, eye pain, discharge from eye  · HENT  o Denies  o : sore throat, hearing changes, tinnitus, nose bleeding, sinus pain, nasal discharge, ear pain  · Breasts  o Denies  o : lumps, tenderness, swelling, nipple discharge  · Cardiovascular  o Denies  o : palpitation, chest pain, claudication, pedal edema  · Respiratory  o Denies  o : shortness of breath, WOLF, PND/Orthopnea, hemoptysis, dry cough, productive cough  · Gastrointestinal  o Denies  o : nausea, vomiting, reflux, diarrhea, constipation, abdominal pain, blood in stools  · Genitourinary  o Denies  o : frequency, urgency, dysuria, vaginal discharge, penile discharge, incontinence, nocturia, irregular menses, hot flashes  · Neurologic  o Denies  o : unsteady gait, weakness, dizziness, H/A  · Musculoskeletal  o Denies  o : myalgias, joint pain, joint swelling  · Endocrine  o Denies  o : heat intolerance, cold intolerance, polyuria, polydipsia  · Psychiatric  o Denies  o : suicidal ideation, homicidal ideation, mood changes, hallucinations, memory  · Heme-Lymph  o Denies  o : easy bleeding, easy bruising, edema, lymph node enlargement or tenderness  · Allergic-Immunologic  o Denies  o : bees, frequent illnesses, seasonal allergies      Vitals  Date Time BP Position Site L\R Cuff Size HR RR TEMP (F) WT  HT  BMI kg/m2 BSA m2 O2 Sat        08/26/2020 07:52 /62 Sitting    68 - R   207lbs 8oz 5'  6\" " 33.49 2.09           Physical Examination  · Constitutional  o Appearance  o : well developed, well-nourished, no acute distress  · Head and Face  o Head  o : normocephalic, atraumatic  · Eyes  o Conjunctivae  o : conjunctivae normal  o Sclerae  o : sclerae white  o Pupils and Irises  o : pupils equal and round  o Eyelids/Ocular Adnexae  o : eyelid appearance normal, no exudates present  · Ears, Nose, Mouth and Throat  o Ears  o :   § External Ears  § : external auditory canal appearance normal, no discharge present  § Otoscopic Examination  § : tympanic membranes pearly white/gray bilaterally  o Nose  o :   § External Nose  § : no lesions noted  § Intranasal Exam  § : mucosa within normal limits, vestibules normal, no intranasal lesions present, septum midline, sinuses non tender to palpation  § Nasopharynx  § : no discharge present  o Oral Cavity  o :   § Oral Mucosa  § : oral mucosa light pink  § Lips  § : lip appearance normal  § Teeth  § : normal dentation for age  o Throat  o :   § Oropharynx  § : tonsils without exudate, no palatal petechiae  · Neck  o Inspection/Palpation  o : normal appearance, no masses or tenderness, trachea midline  o Range of Motion  o : cervical range of motion within normal limits  o Thyroid  o : gland size normal, nontender, no nodules or masses present on palpation  · Respiratory  o Respiratory Effort  o : breathing unlabored  o Inspection of Chest  o : chest rise symmetric bilaterally  o Auscultation of Lungs  o : clear to auscultation bilaterally throughout inspiration and expiration  · Cardiovascular  o Heart  o :   § Auscultation of Heart  § : regular rate and rhythm, no murmurs, gallops or rubs  o Peripheral Vascular System  o :   § Extremities  § : no edema  · Lymphatic  o Neck  o : no cervical lymphadenopathy, no supraclavicular lymphadenopathy  · Skin and Subcutaneous Tissue  o General Inspection  o : no rashes or lesions present, no areas of discoloration  o Body Hair  o :  hair normal for age, general body hair distribution normal for age  o Digits and Nails  o : no clubbing, cyanosis, deformities or edema present, normal appearing nails  · Neurologic  o Mental Status Examination  o :   § Orientation  § : grossly oriented to person, place and time  o Gait and Station  o : normal gait, able to stand without difficulty  · Psychiatric  o Judgement and Insight  o : judgment and insight intact  o Mood and Affect  o : mood normal, affect appropriate  o Presence of Abnormal Thoughts  o : no hallucinations, no delusions present, no psychotic thoughts     mild tmj tenderness with palpation           Assessment  · Anxiety disorder     300.00/F41.9  · Hyperlipidemia     272.4/E78.5  · Hypothyroidism     244.9/E03.9  · Major depressive disorder     296.20/F32.2  · Osteoporosis     733.00/M81.0  · Other long term (current) drug therapy     V58.69/Z79.899  · TMJ tenderness, right     524.62/M26.621      Plan  · Orders  o ACO-39: Current medications updated and reviewed () - - 08/26/2020  o ACO-19: Colorectal cancer screening results documented and reviewed (3017F) - - 08/26/2020  · Medications  o cyclobenzaprine 10 mg oral tablet   SIG: take 1 tablet by oral route 2 times a day as needed   DISP: (60) tablets with 0 refills  Prescribed on 08/26/2020     o albuterol sulfate 2.5 mg /3 mL (0.083 %) inhalation solution for nebulization   SIG: inhale 3 milliliters (2.5 mg) by nebulization route 3 times per day as needed   DISP: (1) Box with 2 refills  Refilled on 08/26/2020     o Claritin 10 mg oral tablet   SIG: take 1 tablet (10 mg) by oral route once daily   DISP: (90) tablet with 1 refills  Refilled on 08/26/2020     o Detrol LA 2 mg oral capsule,extended release 24hr   SIG: take 1 capsule (2 mg) by oral route once daily for 90 days   DISP: (90) capsules with 1 refills  Refilled on 08/26/2020     o diclofenac sodium 75 mg oral tablet,delayed release (DR/EC)   SIG: take 1 tablet (75 mg) by oral  route 2 times per day   DISP: (60) tablets with 2 refills  Refilled on 08/26/2020     o Dymista 137-50 mcg/spray nasal spray,non-aerosol   SIG: spray 1 spray in each nostril by intranasal route 2 times per day   DISP: (1) 23 gm squeez btl with 5 refills  Refilled on 08/26/2020     o Lexapro 10 mg oral tablet   SIG: take 1 tablet (10 mg) by oral route once daily for 90 days   DISP: (90) tablets with 1 refills  Refilled on 08/26/2020     o omeprazole 40 mg oral capsule,delayed release(DR/EC)   SIG: take 1 capsule (40 mg) by oral route once daily before a meal for 90 days   DISP: (90) capsules with 1 refills  Refilled on 08/26/2020     o Oysco 500/D 500 mg(1,250mg) -200 unit oral tablet   SIG: take 1 tablet by oral route daily for 90 days   DISP: (90) tablets with 1 refills  Refilled on 08/26/2020     o Prempro 0.45-1.5 mg oral tablet   SIG: take 1 tablet by oral route once daily for 90 days   DISP: (90) tablets with 1 refills  Refilled on 08/26/2020     o ProAir HFA 90 mcg/actuation inhalation HFA aerosol inhaler   SIG: inhale 1 - 2 puffs (90 - 180 mcg) by inhalation route every 6 hours as needed for 90 days   DISP: (3) 8.5 gm canister with 1 refills  Refilled on 08/26/2020     o Singulair 10 mg oral tablet   SIG: take 1 tablet (10 mg) by oral route once daily in the evening   DISP: (90) tablets with 1 refills  Refilled on 08/26/2020     o Synthroid 125 mcg oral tablet   SIG: take 1 tablet (125 mcg) by oral route once daily for 90 days   DISP: (90) tablets with 1 refills  Refilled on 08/26/2020     o Voltaren 1 % topical gel   SIG: apply 2 gram to the affected area(s) by topical route 4 times per day   DISP: (1) 100 gm tube with 1 refills  Refilled on 08/26/2020     o terbinafine HCl 250 mg oral tablet   SIG: take 1 tablet (250 mg) by oral route once daily   DISP: (30) tablets with 1 refills  Discontinued on 08/26/2020     o Medications have been Reconciled  o Transition of Care or Provider  Policy  · Instructions  o Advised that cheeses and other sources of dairy fats, animal fats, fast food, and the extras (candy, pastries, pies, doughnuts and cookies) all contain LDL raising nutrients. Advised to increase fruits, vegetables, whole grains, and to monitor portion sizes.   o Patient was educated/instructed on their diagnosis, treatment and medications prior to discharge from the clinic today.            Electronically Signed by: FERNY Martin -Author on August 26, 2020 09:23:19 AM

## 2021-05-14 VITALS — TEMPERATURE: 97.6 F | HEIGHT: 66 IN | WEIGHT: 209.37 LBS | BODY MASS INDEX: 33.65 KG/M2

## 2021-05-14 VITALS
BODY MASS INDEX: 34.07 KG/M2 | HEART RATE: 63 BPM | TEMPERATURE: 97.4 F | WEIGHT: 212 LBS | SYSTOLIC BLOOD PRESSURE: 132 MMHG | OXYGEN SATURATION: 98 % | HEIGHT: 66 IN | DIASTOLIC BLOOD PRESSURE: 76 MMHG

## 2021-05-14 VITALS
WEIGHT: 207.5 LBS | BODY MASS INDEX: 33.35 KG/M2 | HEIGHT: 66 IN | SYSTOLIC BLOOD PRESSURE: 112 MMHG | HEART RATE: 68 BPM | DIASTOLIC BLOOD PRESSURE: 62 MMHG

## 2021-05-14 VITALS
OXYGEN SATURATION: 96 % | TEMPERATURE: 97.4 F | HEART RATE: 77 BPM | WEIGHT: 210 LBS | BODY MASS INDEX: 33.75 KG/M2 | HEIGHT: 66 IN | SYSTOLIC BLOOD PRESSURE: 122 MMHG | DIASTOLIC BLOOD PRESSURE: 68 MMHG

## 2021-05-14 NOTE — PROGRESS NOTES
"   Progress Note      Patient Name: Judy Ramey   Patient ID: [id number]   Sex: Female   YOB: 1960    Primary Care Provider: Milton ISAACS   Referring Provider: Milton ISAACS    Visit Date: April 9, 2021    Provider: Enrico Roper MD   Location: [facility]   Location Address: [address] [place] [address], [address] [place] [address]  [id number]   Location Phone: [phone]          Chief Complaint     \"I have been hoarse since February.\"       History Of Present Illness     Judy Ramey is a 60 year old /Black female with past medical history significant for hypothyroidism, GERD, chronic rhinitis, and previous tobacco use who returns today for evaluation of hoarseness.  She was originally seen on 7/31/2019 for bilateral nasal congestion which appeared consistent with inferior turbinate hypertrophy.  She tells me that back in February 2021 she acutely developed hoarseness after having difficulty with cough and sore throat.  This has been constant although the severity will sometimes fluctuate throughout the day.  She does have a history of reflux for which she takes famotidine nightly.  She also has a history of asthma for which she is on Advair and using albuterol rescue inhaler.  She continues to use Dymista for her allergies.  She is a former smoker who quit 30 years ago.  She has tried drinking more water and tea without improvement.  She has been on a course of steroids which did not seem to help.                  Past Medical History  Change in voice; Cough; Dry mouth; GERD (gastroesophageal reflux disease); Hoarseness of voice; Hypothyroidism; Low back pain         Past Surgical History  Colonoscopy; Uterine ablation         Medication List  albuterol sulfate 2.5 mg /3 mL (0.083 %) inhalation solution for nebulization; Claritin 10 mg oral tablet; cyclobenzaprine 10 mg oral tablet; diclofenac sodium 75 mg oral tablet,delayed release (DR/EC); Dupixent " "Syringe 200 mg/1.14 mL subcutaneous syringe; Dymista 137-50 mcg/spray nasal spray,non-aerosol; estradiol 0.05 mg/24 hr transdermal patch weekly; Lexapro 10 mg oral tablet; lidocaine 5 % topical adhesive patch,medicated; Lubricating Plus 0.5 % ophthalmic (eye) dropperette; omeprazole 40 mg oral capsule,delayed release(DR/EC); Oysco 500/D 500 mg(1,250mg) -200 unit oral tablet; ProAir HFA 90 mcg/actuation inhalation HFA aerosol inhaler; Singulair 10 mg oral tablet; Synthroid 125 mcg oral tablet; Voltaren 1 % topical gel         Allergy List  Codiene         Family Medical History  Family history of Arthritis; Family history of cancer; Family history of stroke; Family history of heart disease; Family history of diabetes mellitus         Social History  Alcohol (Never); lives with spouse; ; Tobacco (Former); Unemployed         Immunizations  Name Date Admin   Influenza 09/30/2020   Influenza 10/28/2019         Review of Systems  · Constitutional  o Admits  o : night sweats  o Denies  o : fever, weight loss  · Eyes  o Denies  o : discharge from eye, impaired vision  · HENT  o Admits  o : *See HPI  · Cardiovascular  o Denies  o : chest pain, irregular heart beats  · Respiratory  o Admits  o : shortness of breath  o Denies  o : wheezing, coughing up blood  · Gastrointestinal  o Denies  o : heartburn, reflux, vomiting blood  · Genitourinary  o Denies  o : frequency  · Integument  o Denies  o : rash, skin dryness  · Neurologic  o Admits  o : loss of balance  o Denies  o : seizures, loss of consciousness, dizziness  · Endocrine  o Denies  o : cold intolerance, heat intolerance  · Heme-Lymph  o Denies  o : easy bleeding, anemia      Vitals  Date Time BP Position Site L\R Cuff Size HR RR TEMP (F) WT  HT  BMI kg/m2 BSA m2 O2 Sat FR L/min FiO2 HC       04/09/2021 02:06 PM        97.6 209lbs 6oz 5'  6\" 33.79 2.1             Physical Examination  · Constitutional  o Appearance  o : well developed, well-nourished, alert and in " no acute distress, voice hoarse  · Head and Face  o Head  o :   § Inspection  § : no deformities or lesions  o Face  o :   § Inspection  § : No facial lesions; House-Brackmann I/VI bilaterally  § Palpation  § : No TMJ crepitus nor  muscle tenderness bilaterally  · Eyes  o Vision  o :   § Visual Fields  § : Extraocular movements are intact. No spontaneous or gaze-induced nystagmus.  o Conjunctivae  o : clear  o Sclerae  o : clear  o Pupils and Irises  o : pupils equal, round, and reactive to light.   · Ears, Nose, Mouth and Throat  o Ears  o :   § External Ears  § : appearance within normal limits, no lesions present  § Otoscopic Examination  § : tympanic membrane appearance within normal limits bilaterally without perforations, well-aerated middle ears  § Hearing  § : intact to conversational voice both ears  o Nose  o :   § External Nose  § : appearance normal  § Intranasal Exam  § : mucosa within normal limits, vestibules normal, no intranasal lesions present, septum midline, hypertrophy of both inferior nasal turbinates, sinuses non tender to percussion  o Oral Cavity  o :   § Oral Mucosa  § : oral mucosa normal without pallor or cyanosis  § Lips  § : lip appearance normal  § Teeth  § : Partial dentition for age  § Gums  § : gums pink, non-swollen, no bleeding present  § Tongue  § : tongue appearance normal; normal mobility  § Palate  § : hard palate normal, soft palate appearance normal with symmetric mobility  o Throat  o :   § Oropharynx  § : no inflammation or lesions present, tonsils within normal limits  § Hypopharynx  § : Deferred secondary to gag reflex  § Larynx  § : Deferred secondary to gag reflex  · Neck  o Inspection/Palpation  o : normal appearance, no masses or tenderness, trachea midline; thyroid size normal, nontender, no nodules or masses present on palpation  · Respiratory  o Respiratory Effort  o : breathing unlabored  o Inspection of Chest  o : normal appearance, no  retractions  · Cardiovascular  o Heart  o : regular rate and rhythm  · Lymphatic  o Neck  o : no lymphadenopathy present  o Supraclavicular Nodes  o : no lymphadenopathy present  o Preauricular Nodes  o : no lymphadenopathy present  · Skin and Subcutaneous Tissue  o General Inspection  o : Regarding face and neck - there are no rashes present, no lesions present, and no areas of discoloration  · Neurologic  o Cranial Nerves  o : cranial nerves II-XII are grossly intact bilaterally  o Gait and Station  o : normal gait, able to stand without diffculty  · Psychiatric  o Judgement and Insight  o : judgment and insight intact  o Mood and Affect  o : mood normal, affect appropriate          Results     Procedure: Flexible fiberoptic nasolaryngoscopy.    Indications: Persistent hoarseness in a former smoker.    Summary: The patient's bilateral nares were decongested and anesthetized with Afrin and lidocaine sprays respectively. After giving the medications ample time to take effect flexible fiberoptic scope was inserted in the patient's right naris revealing a normal-appearing nasal cavity and nasopharynx. The base of tongue, vallecula, epiglottis, aryepiglottic folds, and arytenoid cartilages are all normal-appearing. The piriform sinuses were normal in appearance. The bilateral false vocal folds are normal in appearance.  The bilateral true vocal folds reveal a small white plaques with a small amount of surrounding erythema in the mid true vocal folds.  They adducted and abducted normally. The subglottis was widely patent. The patient tolerated the procedure well.       Assessment  · Voice hoarseness     784.42/R49.0  · History of tobacco use     V15.82/Z87.891  · Laryngeal candidiasis     112.89/B37.89      Plan  · Orders  o Laryngoscopy (Flex) [facility] ([id number]) - 784.42/R49.0, V15.82/Z87.891 - 04/09/2021  · Medications  o Diflucan 150 mg oral tablet   SIG: take 1 tablet by oral route daily for 14 days   DISP: (14) Tablet  with 0 refills  Prescribed on 04/09/2021     o Medications have been Reconciled  o Transition of Care or Provider Policy  · Instructions  o Impressions and findings were discussed Mrs. Ramey at great length. Currently, she is seen for evaluation of constant hoarseness since February in the setting of previous tobacco use. Examination today reveals significant voice hoarseness and bilateral true vocal fold white plaques concerning for laryngeal candidiasis. She does use Advair for her asthma. We discussed the pathophysiology and natural history of laryngeal candidiasis. Options for management were discussed and she will be placed on a course of Diflucan. She will follow-up in 3 weeks or sooner if needed.  · Correspondence  o ENT Letter to Referring MD (Milton ISAACS) - 04/09/2021            Electronically Signed by: Enrico Roper MD -Author on April 9, 2021 05:52:30 PM

## 2021-05-14 NOTE — PROGRESS NOTES
Progress Note      Patient Name: Judy Ramey   Patient ID: [id number]   Sex: Female   YOB: 1960    Primary Care Provider: Milton ISAACS   Referring Provider: Milton ISAACS    Visit Date: February 8, 2021    Provider: FERNY Martin   Location: [facility]   Location Address: [address] [place] [address], [address] [place] [address]  [id number]   Location Phone: [phone]          Chief Complaint  · Needle like pain in abdominal area       History Of Present Illness  Judy Ramey is a 60 year old /Black female who presents for evaluation and treatment of:      Presents to the office today complaints of paresthesias in the left side of her abdomen.  Patient states that she has the going sensation on the left side of her lower abdomen.  Patient states that this is the area that she gets her Dupixent injections.  Patient denies any erythema or edema.  Patient also states that she needs refills on her medications.  I did explain to the patient that we had not had labs completed on her in approximately 11 months.  I explained that I would need to get these results to ensure that she is therapeutic on some of her medications.  Patient's blood pressure is 132/76.  She denies any chest pain shortness of breath or palpitations.       Past Medical History  Disease Name Date Onset Notes   Change in voice --  --    Cough --  --    Dry mouth --  --    GERD (gastroesophageal reflux disease) 08/28/2019 --    Hoarseness of voice --  --    Hypothyroidism 08/28/2019 --    Low back pain --  --          Past Surgical History  Procedure Name Date Notes   Colonoscopy --  --    Uterine ablation --  --          Medication List  Name Date Started Instructions   albuterol sulfate 2.5 mg /3 mL (0.083 %) inhalation solution for nebulization 08/26/2020 inhale 3 milliliters (2.5 mg) by nebulization route 3 times per day as needed   Claritin 10 mg oral tablet 08/26/2020 take 1  tablet (10 mg) by oral route once daily   cyclobenzaprine 10 mg oral tablet 08/26/2020 take 1 tablet by oral route 2 times a day as needed   diclofenac sodium 75 mg oral tablet,delayed release (DR/EC) 08/26/2020 take 1 tablet (75 mg) by oral route 2 times per day   Dupixent 200 mg/1.14 mL subcutaneous syringe  --    Dymista 137-50 mcg/spray nasal spray,non-aerosol 08/26/2020 spray 1 spray in each nostril by intranasal route 2 times per day   estradiol 0.05 mg/24 hr transdermal patch weekly  apply 1 patch by transdermal twice a week   Lexapro 10 mg oral tablet 08/26/2020 take 1 tablet (10 mg) by oral route once daily for 90 days   lidocaine 5 % topical adhesive patch,medicated 09/15/2020 apply 1 patch by transdermal route once daily (May wear up to 12hours.) for 90 days   Lubricating Plus 0.5 % ophthalmic (eye) dropperette 01/29/2020 instill 1 drop by ophthalmic route 2 times a day   omeprazole 40 mg oral capsule,delayed release(DR/EC) 08/26/2020 take 1 capsule (40 mg) by oral route once daily before a meal for 90 days   Oysco 500/D 500 mg(1,250mg) -200 unit oral tablet 08/26/2020 take 1 tablet by oral route daily for 90 days   ProAir HFA 90 mcg/actuation inhalation HFA aerosol inhaler 08/26/2020 inhale 1 - 2 puffs (90 - 180 mcg) by inhalation route every 6 hours as needed for 90 days   Singulair 10 mg oral tablet 08/26/2020 take 1 tablet (10 mg) by oral route once daily in the evening   Synthroid 125 mcg oral tablet 08/26/2020 take 1 tablet (125 mcg) by oral route once daily for 90 days   Voltaren 1 % topical gel 08/26/2020 apply 2 gram to the affected area(s) by topical route 4 times per day         Allergy List  Allergen Name Date Reaction Notes   Codiene --  --  --        Allergies Reconciled  Family Medical History  Disease Name Relative/Age Notes   Stroke  --    Heart Disease  --    Diabetes  --    Family history of Arthritis Father/  Mother/   Mother; Father   Family history of cancer Sister/   Sister   Family  "history of stroke Father/   Father   Family history of heart disease Father/  Mother/   Mother; Father   Family history of diabetes mellitus Father/  Mother/   Mother; Father         Social History  Finding Status Start/Stop Quantity Notes   Alcohol Never --/-- --  06/18/2019 - does not drink, less than 1 drink per day   lives with spouse --  --/-- --  --     --  --/-- --  --    Tobacco Former --/-- --  former smoker, smoked 0.5 year   Unemployed --  --/-- --  --          Immunizations  NameDate Admin Mfg Trade Name Lot Number Route Inj VIS Given VIS Publication   [id number]/30/2020 PMC [employer] [id number]  09/30/2020 08/15/2019   Comments:          Review of Systems  · Constitutional  o Denies  o : fever, fatigue, weight loss, weight gain  · Cardiovascular  o Denies  o : lower extremity edema, claudication, chest pressure, palpitations  · Respiratory  o Denies  o : shortness of breath, wheezing, cough, hemoptysis, dyspnea on exertion  · Gastrointestinal  o Denies  o : nausea, vomiting, diarrhea, constipation, abdominal pain      Vitals  Date Time BP Position Site L\R Cuff Size HR RR TEMP (F) WT  HT  BMI kg/m2 BSA m2 O2 Sat FR L/min FiO2 HC       02/08/2021 11:33 /76 Sitting    63 - R  97.4 212lbs 0oz 5'  6\" 34.22 2.12 98 %            Physical Examination  · Constitutional  o Appearance  o : well-nourished, in no acute distress  · Neck  o Inspection/Palpation  o : normal appearance, no masses or tenderness, trachea midline  o Range of Motion  o : cervical range of motion within normal limits  o Thyroid  o : gland size normal, nontender, no nodules or masses present on palpation  · Respiratory  o Respiratory Effort  o : breathing unlabored  o Inspection of Chest  o : normal appearance  o Auscultation of Lungs  o : normal breath sounds throughout inspiration and expiration  · Cardiovascular  o Heart  o :   § Auscultation of Heart  § : regular rate and rhythm, no murmurs, gallops or " rubs  o Peripheral Vascular System  o :   § Carotid Arteries  § : normal pulses bilaterally, no bruits present  § Pedal Pulses  § : pulses 2 bilaterally  § Extremities  § : no clubbing or edema  · Gastrointestinal  o Abdominal Examination  o : abdomen nontender to palpation, tone normal without rigidity or guarding, no masses present, bowel sounds present in all four quadrants  · Skin and Subcutaneous Tissue  o General Inspection  o : no rashes or lesions present, no areas of discoloration  o Body Hair  o : hair normal for age, general body hair distribution normal for age  o Digits and Nails  o : no clubbing, cyanosis, deformities or edema present, normal appearing nails  · Neurologic  o Mental Status Examination  o :   § Orientation  § : grossly oriented to person, place and time  o Gait and Station  o : normal gait, able to stand without difficulty  · Psychiatric  o Judgement and Insight  o : judgment and insight intact  o Mood and Affect  o : mood normal, affect appropriate  o Presence of Abnormal Thoughts  o : no hallucinations, no delusions present, no psychotic thoughts          Assessment  · Screening for lipid disorders     V77.91/Z13.220  · Allergic rhinitis due to allergen     477.9/J30.9  · Asthma     493.90/J45.909  · GERD (gastroesophageal reflux disease)     530.81/K21.9  · Hypothyroidism     244.9/E03.9  · Lumbago     724.2/M54.5  · Major depressive disorder     296.20/F32.2  · Osteoarthritis     715.90/M19.90      Plan  · Orders  o Physical, Primary Care Panel (CBC, CMP, Lipid, TSH) [facility] ([id number], [id number], [address], [id number]) - - 02/08/2021  o ACO-39: Current medications updated and reviewed (, 1159F) - - 02/08/2021  · Medications  o Claritin 10 mg oral tablet   SIG: take 1 tablet (10 mg) by oral route once daily   DISP: (90) Tablet with 1 refills  Refilled on 02/08/2021     o cyclobenzaprine 10 mg oral tablet   SIG: take 1 tablet by oral route 2 times a day as needed   DISP: (60) Tablet with 0  "refills  Refilled on 02/08/2021     o diclofenac sodium 75 mg oral tablet,delayed release (DR/EC)   SIG: take 1 tablet (75 mg) by oral route 2 times per day   DISP: (180) Tablet with 1 refills  Refilled on 02/08/2021     o Lexapro 10 mg oral tablet   SIG: take 1 tablet (10 mg) by oral route once daily for 90 days   DISP: (90) Tablet with 1 refills  Refilled on 02/08/2021     o lidocaine 5 % topical adhesive patch,medicated   SIG: apply 1 patch by transdermal route once daily (May wear up to 12hours.) for 90 days   DISP: (90) Patch with 0 refills  Refilled on 02/08/2021     o Oysco 500/D 500 mg(1,250mg) -200 unit oral tablet   SIG: take 1 tablet by oral route daily for 90 days   DISP: (90) Tablet with 1 refills  Refilled on 02/08/2021     o Singulair 10 mg oral tablet   SIG: take 1 tablet (10 mg) by oral route once daily in the evening   DISP: (90) Tablet with 1 refills  Refilled on 02/08/2021     o Synthroid 125 mcg oral tablet   SIG: take 1 tablet (125 mcg) by oral route once daily for 90 days   DISP: (90) Tablet with 1 refills  Refilled on 02/08/2021     o Medications have been Reconciled  o Transition of Care or Provider Policy  · Instructions  o Maintain a healthy weight. Avoid tight fitting clothes. Avoid fried, fatty foods, tomato sauce, chocolate, mint, garlic, onion, alcohol. caffeine. Eat smaller meals, dont lie down after a meal, dont smoke. Elevate the head of your bed 6-9 inches.  o Patient agrees to a \"No Self Harm\" contract. Patient will either call us, 1, ER, [facility], [facility].  o The patient and I discussed the need for therapy, either with a certified counselor, psychologist, and/or family . If no improvement is noted or worsening of their condition, return to office or ER. But also discussed with patient that if they are non-responsive to the type of medication they may need to see a psychiatrist for further evaluation and management.   o Patient was " given an SSRI/SSNRI medication and warned of possible side effects of the medication including potential for increase risk of suicidal thoughts and feelings. Patient was instructed that if they begin to exhibit any of these effects they will discontinue the medication immediately and contact our office or the ER ASAP.   o Patient was educated/instructed on their diagnosis, treatment and medications prior to discharge from the clinic today.  o Minutes spent with patient including greater than 50% in Education/Counseling/Care Coordination.  o Time spent with the patient was minutes, more than 50% face to face.  o Electronically Identified Patient Education Materials Provided Electronically  · Disposition  o Call or Return if symptoms worsen or persist.  o Follow up as scheduled            Electronically Signed by: FERNY Martin -Author on February 8, 2021 12:54:50 PM

## 2021-05-15 VITALS
SYSTOLIC BLOOD PRESSURE: 116 MMHG | HEIGHT: 66 IN | DIASTOLIC BLOOD PRESSURE: 40 MMHG | HEART RATE: 63 BPM | OXYGEN SATURATION: 99 % | WEIGHT: 199 LBS | BODY MASS INDEX: 31.98 KG/M2 | TEMPERATURE: 98.9 F | RESPIRATION RATE: 12 BRPM

## 2021-05-15 VITALS
TEMPERATURE: 99 F | HEART RATE: 85 BPM | RESPIRATION RATE: 18 BRPM | HEIGHT: 66 IN | SYSTOLIC BLOOD PRESSURE: 122 MMHG | DIASTOLIC BLOOD PRESSURE: 74 MMHG | WEIGHT: 210 LBS | BODY MASS INDEX: 33.75 KG/M2 | OXYGEN SATURATION: 95 %

## 2021-05-15 VITALS
HEIGHT: 66 IN | DIASTOLIC BLOOD PRESSURE: 68 MMHG | OXYGEN SATURATION: 99 % | SYSTOLIC BLOOD PRESSURE: 127 MMHG | BODY MASS INDEX: 33.31 KG/M2 | HEART RATE: 55 BPM | WEIGHT: 207.25 LBS

## 2021-05-15 VITALS
OXYGEN SATURATION: 99 % | HEIGHT: 66 IN | TEMPERATURE: 97.5 F | RESPIRATION RATE: 18 BRPM | WEIGHT: 207 LBS | HEART RATE: 74 BPM | SYSTOLIC BLOOD PRESSURE: 122 MMHG | DIASTOLIC BLOOD PRESSURE: 68 MMHG | BODY MASS INDEX: 33.27 KG/M2

## 2021-05-15 VITALS
SYSTOLIC BLOOD PRESSURE: 138 MMHG | TEMPERATURE: 96.7 F | HEART RATE: 64 BPM | OXYGEN SATURATION: 99 % | BODY MASS INDEX: 32.95 KG/M2 | DIASTOLIC BLOOD PRESSURE: 74 MMHG | WEIGHT: 205 LBS | RESPIRATION RATE: 16 BRPM | HEIGHT: 66 IN

## 2021-05-15 VITALS
HEIGHT: 66 IN | BODY MASS INDEX: 32.95 KG/M2 | HEART RATE: 74 BPM | WEIGHT: 205 LBS | RESPIRATION RATE: 16 BRPM | TEMPERATURE: 97.8 F | SYSTOLIC BLOOD PRESSURE: 118 MMHG | DIASTOLIC BLOOD PRESSURE: 82 MMHG | OXYGEN SATURATION: 99 %

## 2021-05-15 VITALS
WEIGHT: 201 LBS | TEMPERATURE: 97.1 F | BODY MASS INDEX: 32.3 KG/M2 | HEIGHT: 66 IN | DIASTOLIC BLOOD PRESSURE: 62 MMHG | RESPIRATION RATE: 16 BRPM | OXYGEN SATURATION: 99 % | HEART RATE: 65 BPM | SYSTOLIC BLOOD PRESSURE: 116 MMHG

## 2021-05-15 VITALS
SYSTOLIC BLOOD PRESSURE: 132 MMHG | BODY MASS INDEX: 33.75 KG/M2 | DIASTOLIC BLOOD PRESSURE: 72 MMHG | WEIGHT: 210 LBS | HEIGHT: 66 IN | HEART RATE: 68 BPM

## 2021-05-15 VITALS
HEIGHT: 66 IN | RESPIRATION RATE: 16 BRPM | WEIGHT: 205 LBS | OXYGEN SATURATION: 99 % | DIASTOLIC BLOOD PRESSURE: 59 MMHG | SYSTOLIC BLOOD PRESSURE: 126 MMHG | HEART RATE: 64 BPM | BODY MASS INDEX: 32.95 KG/M2 | TEMPERATURE: 97.9 F

## 2021-05-15 VITALS
SYSTOLIC BLOOD PRESSURE: 120 MMHG | HEART RATE: 64 BPM | HEIGHT: 66 IN | DIASTOLIC BLOOD PRESSURE: 70 MMHG | WEIGHT: 205.19 LBS | BODY MASS INDEX: 32.98 KG/M2

## 2021-05-15 VITALS
OXYGEN SATURATION: 97 % | WEIGHT: 203.31 LBS | HEIGHT: 66 IN | SYSTOLIC BLOOD PRESSURE: 122 MMHG | BODY MASS INDEX: 32.67 KG/M2 | DIASTOLIC BLOOD PRESSURE: 82 MMHG | RESPIRATION RATE: 16 BRPM | TEMPERATURE: 96.3 F | HEART RATE: 69 BPM

## 2021-05-15 VITALS — BODY MASS INDEX: 33.75 KG/M2 | RESPIRATION RATE: 16 BRPM | HEIGHT: 66 IN | WEIGHT: 210 LBS

## 2021-05-15 VITALS
OXYGEN SATURATION: 97 % | DIASTOLIC BLOOD PRESSURE: 72 MMHG | SYSTOLIC BLOOD PRESSURE: 120 MMHG | WEIGHT: 206 LBS | HEART RATE: 72 BPM | TEMPERATURE: 96.6 F | BODY MASS INDEX: 33.11 KG/M2 | RESPIRATION RATE: 16 BRPM | HEIGHT: 66 IN

## 2021-05-15 VITALS — TEMPERATURE: 98.7 F

## 2021-05-16 VITALS
HEART RATE: 59 BPM | SYSTOLIC BLOOD PRESSURE: 138 MMHG | HEIGHT: 66 IN | RESPIRATION RATE: 16 BRPM | OXYGEN SATURATION: 95 % | DIASTOLIC BLOOD PRESSURE: 82 MMHG | BODY MASS INDEX: 32.78 KG/M2 | TEMPERATURE: 96.8 F | WEIGHT: 204 LBS

## 2021-05-16 VITALS
OXYGEN SATURATION: 97 % | RESPIRATION RATE: 16 BRPM | HEART RATE: 57 BPM | DIASTOLIC BLOOD PRESSURE: 55 MMHG | WEIGHT: 204 LBS | BODY MASS INDEX: 32.78 KG/M2 | HEIGHT: 66 IN | TEMPERATURE: 98.1 F | SYSTOLIC BLOOD PRESSURE: 114 MMHG

## 2021-05-19 ENCOUNTER — OFFICE VISIT CONVERTED (OUTPATIENT)
Dept: PULMONOLOGY | Facility: CLINIC | Age: 61
End: 2021-05-19
Attending: INTERNAL MEDICINE

## 2021-05-25 ENCOUNTER — OFFICE VISIT CONVERTED (OUTPATIENT)
Dept: FAMILY MEDICINE CLINIC | Facility: CLINIC | Age: 61
End: 2021-05-25
Attending: NURSE PRACTITIONER

## 2021-05-26 ENCOUNTER — TRANSCRIBE ORDERS (OUTPATIENT)
Dept: ADMINISTRATIVE | Facility: HOSPITAL | Age: 61
End: 2021-05-26

## 2021-05-26 DIAGNOSIS — Z78.0 POST-MENOPAUSAL: Primary | ICD-10-CM

## 2021-05-28 VITALS
BODY MASS INDEX: 33.29 KG/M2 | TEMPERATURE: 97.9 F | DIASTOLIC BLOOD PRESSURE: 69 MMHG | BODY MASS INDEX: 33.11 KG/M2 | WEIGHT: 207.12 LBS | HEART RATE: 80 BPM | WEIGHT: 203.31 LBS | DIASTOLIC BLOOD PRESSURE: 62 MMHG | OXYGEN SATURATION: 99 % | HEIGHT: 66 IN | OXYGEN SATURATION: 99 % | HEIGHT: 66 IN | HEIGHT: 66 IN | SYSTOLIC BLOOD PRESSURE: 131 MMHG | TEMPERATURE: 97.8 F | TEMPERATURE: 98.5 F | RESPIRATION RATE: 14 BRPM | HEART RATE: 70 BPM | HEIGHT: 66 IN | WEIGHT: 208.5 LBS | HEART RATE: 56 BPM | DIASTOLIC BLOOD PRESSURE: 67 MMHG | RESPIRATION RATE: 14 BRPM | WEIGHT: 206 LBS | SYSTOLIC BLOOD PRESSURE: 121 MMHG | TEMPERATURE: 97.7 F | BODY MASS INDEX: 33.14 KG/M2 | SYSTOLIC BLOOD PRESSURE: 140 MMHG | RESPIRATION RATE: 14 BRPM | HEIGHT: 66 IN | TEMPERATURE: 97.8 F | DIASTOLIC BLOOD PRESSURE: 77 MMHG | OXYGEN SATURATION: 98 % | WEIGHT: 206.19 LBS | SYSTOLIC BLOOD PRESSURE: 117 MMHG | DIASTOLIC BLOOD PRESSURE: 73 MMHG | OXYGEN SATURATION: 98 % | HEART RATE: 65 BPM | BODY MASS INDEX: 32.67 KG/M2 | BODY MASS INDEX: 33.51 KG/M2 | RESPIRATION RATE: 14 BRPM | SYSTOLIC BLOOD PRESSURE: 133 MMHG | HEART RATE: 64 BPM | OXYGEN SATURATION: 99 % | RESPIRATION RATE: 18 BRPM

## 2021-05-28 VITALS
OXYGEN SATURATION: 98 % | DIASTOLIC BLOOD PRESSURE: 55 MMHG | DIASTOLIC BLOOD PRESSURE: 67 MMHG | HEART RATE: 80 BPM | OXYGEN SATURATION: 98 % | BODY MASS INDEX: 34.15 KG/M2 | WEIGHT: 212.25 LBS | HEART RATE: 82 BPM | TEMPERATURE: 97.2 F | WEIGHT: 212.5 LBS | SYSTOLIC BLOOD PRESSURE: 125 MMHG | RESPIRATION RATE: 16 BRPM | RESPIRATION RATE: 16 BRPM | HEIGHT: 66 IN | TEMPERATURE: 98.4 F | SYSTOLIC BLOOD PRESSURE: 138 MMHG | BODY MASS INDEX: 34.11 KG/M2 | HEIGHT: 66 IN

## 2021-05-28 VITALS
OXYGEN SATURATION: 98 % | HEIGHT: 66 IN | WEIGHT: 213 LBS | HEART RATE: 58 BPM | SYSTOLIC BLOOD PRESSURE: 141 MMHG | RESPIRATION RATE: 15 BRPM | BODY MASS INDEX: 34.23 KG/M2 | TEMPERATURE: 98.5 F | DIASTOLIC BLOOD PRESSURE: 68 MMHG

## 2021-05-28 VITALS
RESPIRATION RATE: 15 BRPM | WEIGHT: 210 LBS | OXYGEN SATURATION: 99 % | DIASTOLIC BLOOD PRESSURE: 67 MMHG | HEART RATE: 64 BPM | SYSTOLIC BLOOD PRESSURE: 140 MMHG | HEIGHT: 66 IN | TEMPERATURE: 98 F | OXYGEN SATURATION: 97 % | HEART RATE: 60 BPM | BODY MASS INDEX: 33.27 KG/M2 | DIASTOLIC BLOOD PRESSURE: 60 MMHG | SYSTOLIC BLOOD PRESSURE: 134 MMHG | WEIGHT: 207 LBS | HEIGHT: 66 IN | RESPIRATION RATE: 14 BRPM | BODY MASS INDEX: 33.75 KG/M2 | TEMPERATURE: 98.1 F

## 2021-05-28 NOTE — PROGRESS NOTES
Patient: JUDY HERNANDEZ     Acct: [id number]     Report: #[id number]  UNIT #: [id number]     : 1960    Encounter Date:2020  PRIMARY CARE: SALINA PARK  ***Signed***  --------------------------------------------------------------------------------------------------------------------  TELEHEALTH NOTE      History of Present Illness      Chief Complaint: 2 week follow up/CXR, lab results            Judy Hernandez is presenting for evaluation via Telehealth visit by phone.     Verbal consent obtained before beginning visit.            Provider spent 12 minutes with the patient during telehealth visit.            The following staff were present during the visit: Eliana Zhang MA, Hank Vee DO            The patient is a 59 year old female with nasal polyps and asthma  who presents     for Telehealth visit today. She is complaining of shortness of breath, she     requires frequent use of her rescue inhaler 2-3 times a day and she has     nocturnal symptoms. She requires frequent prednisone bursts. She has persistent     allergies and reports compliance with all her bronchodilator therapies including    high dose advair, Singulair, spiriva and uses her rescue inhaler at least 2-3     times a day if not more.             Physical exam is deferred due to Telehealth visit.                           Past Med History      Past Med History: Asthma            Former Smoker: Pt started smoking at 17 years old, 1 pack per 3 days x 4 years,     quit at the age of 23 years            Flu and pneumonia Vaccines: Current      Overview of Symptoms      Pt complains of:  SOA, wheezing, cough            Pt denies: fever, chills, nausea, vomiting            Most Recent Lab Findings      Laboratory Tests      20 10:05            Allergies/Medications      Allergies:        Coded Allergies:             CODEINE (Verified  Allergy, Intermediate, Headaches, 20)      Medications    Last Reconciled on  5/28/20 11:15 by RACHEL VEE MD      Sodium Chloride (Ocean Nasal Spray) 104 Ml Spray      2 SPRAYS NARE EACH TID, #3 BOTTLE 4 Refills         Prov: Rachel Vee         4/7/20       Neomycin/Bacitracin/Polymyxinb (Triple Antibiotic Ointment) 14.17 Gm Oint...g.      1 APL TOPICAL QDAY, #3 TUBE         Prov: Rachel Vee         4/7/20       Oxymetazoline Hcl (AFRIN) 15 Ml Spray      1 SPRAYS NARE EACH BID PRN for CONGESTION, #1 BOTTLE 3 Refills         Prov: Rachel Vee         4/7/20       Omeprazole (Omeprazole*) 40 Mg Capsule      40 MG PO BID, #60 CAP 2 Refills         Reported         3/5/20       Tiotropium Bromide (Spiriva Respimat 2.5 mcg/Puff) 4 Gm Mist.inhal      2 PUFFS INH RTQDAY for 90 Days, #3 MDI 2 Refills         Prov: [facility]         3/5/20       Fluticasone/Salmeterol 230/21 (Advair /21 MCG) 12 Gm Hfa.aer.ad      2 PUFF INH RTBID, #3 INH 4 Refills         Prov: [facility]         3/5/20       Albuterol/Ipratropium (Duoneb) 3 Ml Ampul.neb      3 ML INH Q4H PRN for SHORTNESS OF BREATH, #120 NEB 5 Refills         Prov: [facility]         3/5/20       Tolterodine Tartrate (Detrol) 1 Mg Tablet      1 MG PO QDAY, TAB         Reported         1/2/20       Levothyroxine (Synthroid) 0.125 Mg      0.125 MG PO QDAY@07, #30 TAB 0 Refills         Reported         1/2/20       Gabapentin (Gabapentin) 300 Mg Capsule      300 MG PO QDAY PRN for JOINT PAIN, #60 CAP 0 Refills         Reported         1/2/20       Diclofenac Potassium (Diclofenac Potassium) 50 Mg Tablet      50 MG PO QDAY, #60 TAB 0 Refills         Reported         1/2/20       Azelastine/Fluticasone (Dymista Nasal Spray) 23 Gm Spray.pump      1 SPRAYS NARE EACH BID, #3 BOTTLE 3 Refills         Prov: Rachel Vee         2/18/19       Albuterol (Proair HFA) 8.5 Gm Inh      2 PUFFS INH RTQ6H, #1 INH 0 Refills         Reported         9/24/18       Estrogen,Conj-Medroxyprogest 0.3-1.5 Mg  (Prempro 0.3-1.5 MG) 1 Each Tablet      1 TAB PO QDAY, #30 TAB 0 Refills         Reported         9/24/18       Montelukast Sodium (Montelukast*) 10 Mg Tablet      10 MG PO QDAY, TAB         Reported         9/24/18       Desloratadine/Pseudoephedrine 120/2.5 Mg (CLARINEX-D 12 HOUR TABLET) 1 Each     Tbmp.12hr      1 TAB PO BID for 30 Days, #60 TAB         Reported         9/24/18            Plan/Instructions      Ambulatory Assessment/Plan:        Asthma - J45.909            Notes      Discontinued Medications      * predniSONE 20 MG TABLET: 20 MG PO QDAY #20      * predniSONE 20 MG TABLET: 40 MG PO QDAY 7 Days #14      New Diagnostics      * Immunoglobulin  E (I, Week         Dx: Asthma - J45.909      * CBC, Month         Dx: Asthma - J45.909      New Office Procedures      * Specialty Drug DUPIXENT, Routine         Dx: Asthma - J45.909      Plan/Instructions      * Plan Of Care: ()            * Chronic conditions reviewed and taken into consideration for today's treatment      plan.      * Patient instructed to seek medical attention urgently for new or worsening       symptoms.      * Patient was educated/instructed on their diagnosis, treatment and medications       prior to discharge from the clinic today.            ASSESSMENT:      1. Severe persistent uncontrolled asthma.       2. Nasal polyps.       3. Allergic rhinitis.       4.  Dyspnea.             PLAN:      1. At this time we will check IgE level and CBC.       2. Given the patient's severe persistent uncontrolled asthma with the presence     of nasal polyps and on optimal medical therapy, we will try to write a     prescription for the patient for Dupixent. Dupixent will likely help with asthma    and nasal polyps.       3. Continue nasal steroids.       4. Continue Singulair.       5. We will follow up in the office in 1 month with a phone visit.       6. I have personally reviewed laboratory data, imaging and previous medical     records.       Codes:  Phone Eval [id number] mi [id number]            Electronically signed by Hank Vee  06/18/2020 08:39       Disclaimer: Converted document may not contain table formatting or lab diagrams. Please see [employer] for the authenticated document.

## 2021-05-28 NOTE — PROGRESS NOTES
"Patient: TOBY HERNANDEZ     Acct: [id number]     Report: #[id number]  UNIT #: [id number]     : 1960    Encounter Date:2019  PRIMARY CARE: SALINA PARK  ***Signed***  --------------------------------------------------------------------------------------------------------------------  Chief Complaint      Encounter Date      2019            Primary Care Provider      SALINA PARK            Referring Provider      [facility]            Patient Complaint      Patient is complaining of      Pt here for f/u, rupinder            VITALS      Height 5 ft 6 in / 167.64 cm      Weight 206 lbs 0 oz / 93.734650 kg      BSA 2.03 m2      BMI 33.2 kg/m2      Temperature 97.8 F / 36.56 C - Oral      Pulse 65      Respirations 14      Blood Pressure 121/69 Sitting, Right Arm      Pulse Oximetry 98%, Room air      Initial Exhaled Nitrous Oxide      Exhaled Nitrous Oxide Results:  37            HPI      The patient is a very pleasant 58 year old -American female who is here     today for follow up. The patient has a history of asthma with severe allergic     rhinitis.  The patient was placed on Trelegy at last office visit. She does feel    that this significantly has helped with her symptoms. The patient states since     using the Trelegy she seldom has to use her rescue inhaler.  There was a day     last week where she missed three doses of her Trelegy and then she had a day     that was \"bad for her\" where she had to take her rescue inhaler four times a     day.  However, she straightened out after being on her Trelegy for the next     several days.  She feels that her asthma is worse at this time of the year when     the pollen counts at high and she tries to stay in and keep her air conditioner     running. She has no increased cough and no increased sputum production.      Breathing is good with the exception of chronic nasal congestion which she is     having issues with.  She is currently " taking Dymista, Singulair, Stahist and an     antihistamine and has persistent symptoms despite these symptoms despite these     symptoms.            ROS      Constitutional:  Denies: Fatigue, Fever, Weight gain, Weight loss, Chills,     Insomnia, Other      Respiratory/Breathing:  Complains of: Shortness of air, Cough; Denies: Wheezing,    Hemoptysis, Pleuritic pain, Other      Endocrine:  Denies: Polydipsia, Polyuria, Heat/cold intolerance, Abnorml     menstrual pattern, Diabetes, Other      Eyes:  Denies: Blurred vision, Vision Changes, Other      Ears, nose, mouth, throat:  Denies: Mouth lesions, Thrush, Throat pain,     Hoarseness, Allergies/Hay Fever, Post Nasal Drip, Headaches, Recent Head Injury,    Nose Bleeding, Neck Stiffness, Thyroid Mass, Hearing Loss, Ear Fullness, Dry     Mouth, Nasal or Sinus Pain, Dry Lips, Nasal discharge, Nasal congestion, Other      Cardiovascular:  Denies: Palpitations, Syncope, Claudication, Chest Pain, Wake     up Gasping for air, Leg Swelling, Irregular Heart Rate, Cyanosis, Dyspnea on     Exertion, Other      Gastrointestinal:  Denies: Nausea, Constipation, Diarrhea, Abdominal pain,     Vomiting, Difficulty Swallowing, Reflux/Heartburn, Dysphagia, Jaundice,     Bloating, Melena, Bloody stools, Other      Genitourinary:  Denies: Urinary frequency, Incontinence, Hematuria, Urgency,     Nocturia, Dysuria, Testicular problems, Other      Musculoskeletal:  Denies: Joint Pain, Joint Stiffness, Joint Swelling, Myalgias,    Other      Hematologic/lymphatic:  DENIES: Lymphadenopathy, Bruising, Bleeding tendencies,     Other      Neurological:  Denies: Headache, Numbness, Weakness, Seizures, Other      Psychiatric:  Denies: Anxiety, Appropriate Effect, Depression, Other      Sleep:  No: Excessive daytime sleep, Morning Headache?, Snoring, Insomnia?, Stop    breathing at sleep?, Other      Integumentary:  Denies: Rash, Dry skin, Skin Warm to Touch, Other      Immunologic/Allergic:   Denies: Latex allergy, Seasonal allergies, Asthma,     Urticaria, Eczema, Other      Immunization status:  No: Up to date            FAMILY/SOCIAL/MEDICAL HX      Surgical History:  Yes: Other Surgeries      Stroke - Family Hx:  Grandparent      Heart - Family Hx:  Father      Diabetes - Family Hx:  Father      Cancer/Type - Family Hx:  Sister      Other Family Medical History:  Father      Is Father Still Living?:  No      Is Mother Still Living?:  Yes      Smoking status:  Former smoker (.5 ppd 4 years, quit 1995)      Anticoagulation Therapy:  No      Antibiotic Prophylaxis:  No      Medical History:  Yes: Allergies, Asthma, Anxiety, Reflux Disease, Thyroid     Problem, Miscellaneous Medical/oth (sleep apnea); No: Sinus Trouble      Psychiatric History      Anxiety            PREVENTION      Hx Influenza Vaccination:  Yes      Date Influenza Vaccine Given:  Sep 1, 2018      Influenza Vaccine Declined:  No      2 or More Falls Past Year?:  No      Fall Past Year with Injury?:  No      Hx Pneumococcal Vaccination:  Yes      Encouraged to follow-up with:  PCP regarding preventative exams.      Chart initiated by      Barbara Purcell MA            ALLERGIES/MEDICATIONS      Allergies:        Coded Allergies:             CODEINE (Verified  Allergy, Intermediate, Headaches, 6/5/19)      Medications    Last Reconciled on 6/5/19 09:02 by HNAK LEACH MD      (Stahist AD)   No Conflict Check               Reported         6/5/19       Fluticasone/Umeclidin/Vilanter (Trelegy Ellipta 100-62.5-25) 1 Each Blst.w.dev      1 PUFF INH RTQDAY, #3 INH 4 Refills         Prov: Hank Leach         2/18/19       Azelastine/Fluticasone (Dymista Nasal Spray) 23 Gm Spray.pump      1 SPRAYS NARE EACH BID, #3 BOTTLE 3 Refills         Prov: Hank Leach         2/18/19       Pantoprazole (Protonix*) 40 Mg Tablet.dr      40 MG PO HS, #90 TAB 4 Refills         Prov: Hank Leach         10/26/18       NEB-Albuterol Sulf (Albuterol  Sulfate) 5 Mg/1 Ml Solution      2.5 MG INH Q6H PRN for SHORTNESS OF BREATH, #3 BOTTLE 0 Refills         Reported         9/24/18       Albuterol (Proair HFA) 8.5 Gm Inh      2 PUFFS INH RTQ6H, #1 INH 0 Refills         Reported         9/24/18       CPAP Compressor (CPAP) 1 Each Each      EACH XX ONCE, #1 0 Refills         Reported         9/24/18       Mupirocin (Bactroban 2% Oint) 22 Gm Oint...g.      1 APL TOPICAL BID, #1 TUBE         Reported         9/24/18       Nebulizer/Compressor (Nebulizer) 1 Each Each      EACH XX ONCE, #1 0 Refills         Reported         9/24/18       MDI-Advair 500/50 (Advair 500/50 Diskus) 1 Each Blst.w.dev      1 PUFF INH RTBID, #1 INH         Reported         9/24/18       Estrogen,Conj-Medroxyprogest 0.3-1.5 Mg (Prempro 0.3-1.5 MG) 1 Each Tablet      1 TAB PO QDAY, #30 TAB 0 Refills         Reported         9/24/18       Montelukast Sodium (Montelukast*) 10 Mg Tablet      10 MG PO QDAY, TAB         Reported         9/24/18       Levothyroxine (Synthroid) Unknown Strength Mg      PO QDAY, #30 TAB 0 Refills         Reported         9/24/18       Buspirone Hcl (Buspar) 15 Mg Tablet      7.5 MG PO QDAY, #30 TAB         Reported         9/24/18       Desloratadine/Pseudoephedrine 120/2.5 Mg (CLARINEX-D 12 HOUR TABLET) 1 Each     Tbmp.12hr      1 TAB PO BID for 30 Days, #60 TAB         Reported         9/24/18       Aspirin Chew (Aspirin Baby) 81 Mg Tab.chew      81 MG PO QDAY, #30 TAB.CHEW 0 Refills         Reported         9/24/18       Folic Acid/Vit Bcomp,C (Super B-Complex Folic-Vit C Tb) 400 Mcg Tablet      1 TAB PO QDAY, TAB         Reported         9/24/18       Meclizine Hcl (Meclizine*) 25 Mg Tablet      25 MG PO TID, #90 TAB 0 Refills         Reported         9/24/18      Current Medications      Current Medications Reviewed 6/5/19            EXAM      GEN-patient appears stated age resting comfortable in no acute distress      Eyes-PERRL,  conjunctiva are normal in  appearance extraocular muscles are intact    , no scleral icterus      Nasal-the patient has severely enlarged turbinate bilaterally with near complete    obstruction of the left nasal passage, no polyps seen no nasal discharge or     ulcerations      Lymphatic-no swollen or enlarged cervical nodes, or axillary node, or femoral     nodes, or supraclavicular nodes      Mouth normal dentition, no erythema no ulcerations oropharynx appears normal no     exudate no evidence of postnasal drip, MP II      Neck-there are no palpable supraclavicular or cervical adenopathy, thyroid is     normal in appearance no apparent nodules, there is no inspiratory or expiratory     stridor      Respiratory-patient exhibits normal work of breathing, speaking in full     sentences without difficulty, the chest is normal in appearance, clear to     auscultation with no wheezes rales or rhonchi, chest is normal to percussion on     both the right and left sides      Cardiovascular-the heart rate is normal and regular S1 and S2 present with no     murmur or extra heart sounds, there is no JVD or pedal edema present      GI-the abdomen is normal in appearance, bowel sounds present and normal in all     quadrants no hepatosplenomegaly or masses felt      Extremities-no clubbing is present, pulses present in all extremities, capillary    refill time is normal      Skin-skin is normal in appearance it is warm and dry, no rashes present, no     evidence of cyanosis, palpation reveals no masses      Neurological-the patient is alert and oriented to time place and person, moves     all 4 extremities, normal gait, normal affect and mood, CN2-12 intact      Psych-normal judgment and insight is good, normal mood and affect, alert and     oriented to person, place, and time, and date      Vtials      Vitals:             Height 5 ft 6 in / 167.64 cm           Weight 206 lbs 0 oz / 93.044246 kg           BSA 2.03 m2           BMI 33.2 kg/m2            Temperature 97.8 F / 36.56 C - Oral           Pulse 65           Respirations 14           Blood Pressure 121/69 Sitting, Right Arm           Pulse Oximetry 98%, Room air            REVIEW      Results Reviewed      P[facility]S Results Reviewed?:  Yes Prev Lab Results, Yes Prev Radiology Results, Yes     Previous [facility] Records            Assessment      Hypertrophy of nasal turbinates - J34.3            Nasal obstruction - J34.89            Notes      New Medications      * (Stahist AD):       Discontinued Medications      * TIOTROPIUM BROMIDE (Spiriva Respimat 1.25 mcg/puff) 4 GM MIST.INHAL: 2 PUFFS       INH QDAY #4      * Fluticasone/Umeclidin/Vilanter (Trelegy Ellipta 100-62.5-25) 1 EACH BLST.W.DEV      : 1 PUFF INH RTQDAY #3      New Referrals      * Ear, Nose, Throat, SCHEDULED PROCEDURE         Enrico Roper with [facility]         Dx: Hypertrophy of nasal turbinates - J34.3      ASSESSMENT/PLAN:      1.  Pulmonary nodule.  Repeat CT scan in 10/2020.      2.  Persistent asthma, controlled, continue Trelegy.  Continue prn albuterol.      3. Allergic rhinitis.  Script given for Stahist.  Continue Dymista, Singulair     and antihistamine.      4.  Chronic nasal obstruction.  The patient with very swollen turbinates. She     does not appear to have any polyps, though she has severe asthma which would put    her at risk for polyps. We will make referral to ENT for evaluation. The patient    feels that if she could breath out of her nose her breathing would be much     improved.      5. I have personally reviewed laboratory data, imaging as well as previous     medical records.            Patient Education      Education resources provided:  Yes      Patient Education Provided:  Acute Asthma                 Disclaimer: Converted document may not contain table formatting or lab diagrams. Please see [employer] for the authenticated document.

## 2021-05-28 NOTE — PROGRESS NOTES
Patient: TOBY HERNANDEZ     Acct: [id number]     Report: #[id number]  UNIT #: [id number]     : 1960    Encounter Date:2021  PRIMARY CARE: SALINA PARK  ***Signed***  --------------------------------------------------------------------------------------------------------------------  Chief Complaint      Encounter Date      2021            Primary Care Provider      SALINA PARK            Referring Provider      SALINA PARK            Patient Complaint      Patient is complaining of      Pt here for 3m f/u, copd            VITALS      Height 5 ft 6 in / 167.64 cm      Weight 212 lbs 4 oz / 96.308482 kg      BSA 2.05 m2      BMI 34.3 kg/m2      Temperature 98.4 F / 36.89 C - Temporal      Pulse 82      Respirations 16      Blood Pressure 138/67 Sitting, Left Arm      Pulse Oximetry 98%, Room air            HPI      The patient is a very pleasant 60 year old  female here for     follow up today.             Dupixent is helping, she has not been taking her bronchodilator therapies twice     daily as prescribed. She still has some dyspnea that is worse with exertion,     improved with rest. She has no chest pain, no heart palpitations but she does     have some cough that is productive of thick sputum at times. It is clear and     non-mucopurulent. Shortness of breath is moderate in severity, significant     improvement is noted with Dupixent. Her symptoms are still persistent but to a     much lessor degree. She has only been on Dupixent for approximately 1.5 months.            ROS      Constitutional:  Denies: Fatigue, Fever, Weight gain, Weight loss, Chills,     Insomnia, Other      Respiratory/Breathing:  Complains of: Shortness of air; Denies: Wheezing, Cough,    Hemoptysis, Pleuritic pain, Other      Endocrine:  Denies: Polydipsia, Polyuria, Heat/cold intolerance, Abnorml     menstrual pattern, Diabetes, Other      Eyes:  Denies: Blurred vision, Vision Changes, Other       Ears, nose, mouth, throat:  Denies: Mouth lesions, Thrush, Throat pain,     Hoarseness, Allergies/Hay Fever, Post Nasal Drip, Headaches, Recent Head Injury,    Nose Bleeding, Neck Stiffness, Thyroid Mass, Hearing Loss, Ear Fullness, Dry     Mouth, Nasal or Sinus Pain, Dry Lips, Nasal discharge, Nasal congestion, Other      Cardiovascular:  Denies: Palpitations, Syncope, Claudication, Chest Pain, Wake     up Gasping for air, Leg Swelling, Irregular Heart Rate, Cyanosis, Dyspnea on     Exertion, Other      Gastrointestinal:  Denies: Nausea, Constipation, Diarrhea, Abdominal pain,     Vomiting, Difficulty Swallowing, Reflux/Heartburn, Dysphagia, Jaundice,     Bloating, Melena, Bloody stools, Other      Genitourinary:  Denies: Urinary frequency, Incontinence, Hematuria, Urgency, No    cturia, Dysuria, Testicular problems, Other      Musculoskeletal:  Denies: Joint Pain, Joint Stiffness, Joint Swelling, Myalgias,    Other      Hematologic/lymphatic:  DENIES: Lymphadenopathy, Bruising, Bleeding tendencies,     Other      Neurological:  Denies: Headache, Numbness, Weakness, Seizures, Other      Psychiatric:  Denies: Anxiety, Appropriate Effect, Depression, Other      Sleep:  No: Excessive daytime sleep, Morning Headache?, Snoring, Insomnia?, Stop    breathing at sleep?, Other      Integumentary:  Denies: Rash, Dry skin, Skin Warm to Touch, Other      Immunologic/Allergic:  Denies: Latex allergy, Seasonal allergies, Asthma,     Urticaria, Eczema, Other      Immunization status:  No: Up to date            FAMILY/SOCIAL/MEDICAL HX      Surgical History:  Yes: Bowel Surgery (COLONOSCOPY), Other Surgeries      Stroke - Family Hx:  Grandparent      Heart - Family Hx:  Father      Diabetes - Family Hx:  Father      Cancer/Type - Family Hx:  Sister      Other Family Medical History:  Father      Is Father Still Living?:  No      Is Mother Still Living?:  Yes      Smoking status:  Former smoker ((.5 ppd 4 years, quit 1995))       Anticoagulation Therapy:  No      Antibiotic Prophylaxis:  No      Medical History:  Yes: Arthritis (LOWER BACK), Asthma (INHALER), Anxiety,     Hemorrhoids/Rectal Prob (WEAK BLADDER), Reflux Disease, Shortness Of Breath,     Thyroid Problem, Miscellaneous Medical/oth (sleep apnea-HYSTERECTOMY SCHEDULED     1/2020); No: Blood Disease, Chemotherapy/Cancer, Deafness or Ringing Ears, Sinus    Trouble      Psychiatric History      Anxiety            PREVENTION      Hx Influenza Vaccination:  Yes      Date Influenza Vaccine Given:  Oct 1, 2020      Influenza Vaccine Declined:  No      2 or More Falls in Past Year?:  No      Fall Past Year with Injury?:  No      Hx Pneumococcal Vaccination:  Yes      Encouraged to follow-up with:  PCP regarding preventative exams.      Chart initiated by      Barbara Leone MA            ALLERGIES/MEDICATIONS      Allergies:        Coded Allergies:             CODEINE (Verified  Allergy, Intermediate, Headaches, 7/8/20)      Medications    Last Reconciled on 1/11/21 17:05 by RACHEL LEACH MD      Alcohol Antiseptic Pads (Alcohol Swabs) 1 Each Med..pad      1 EACH TOPICAL QDAY, #200 EACH 2 Refills         Prov: [facility]         10/14/20       Dupilumab (Dupixent) 200 Mg/1.14 Ml Syringe      200 MG SUBQ [id number], #2 SYRINGE 11 Refills         [facility]         7/22/20       Azelastine/Fluticasone (Dymista Nasal Spray) 23 Gm Spray.pump      1 SPRAYS NARE EACH BID, #3 BOTTLE 3 Refills         Prov[facility]         7/8/20       Terbinafine HCl (Terbinafine) 250 Mg Tablet      250 MG PO QDAY, TAB 0 Refills         Reported         7/8/20       Metoprolol Succinate (Metoprolol Succinate) 25 Mg Tab.er.24h      25 MG PO QDAY, #30 TAB 0 Refills         Reported         7/8/20       Omeprazole (Omeprazole*) 40 Mg Capsule      40 MG PO BID, #60 CAP 2 Refills         Reported         3/5/20       Tiotropium Bromide (Spiriva Respimat 2.5 mcg/Puff) 4 Gm  Mist.inhal      2 PUFFS INH RTQDAY for 90 Days, #3 MDI 2 Refills         Prov: [facility]         3/5/20       Fluticasone/Salmeterol 230/21 (Advair /21 MCG) 12 Gm Hfa.aer.ad      2 PUFF INH RTBID, #3 INH 4 Refills         Prov: [facility]         3/5/20       Albuterol/Ipratropium (Duoneb) 3 Ml Ampul.neb      3 ML INH Q4H PRN for SHORTNESS OF BREATH, #120 NEB 5 Refills         Prov: [facility]         3/5/20       Tolterodine Tartrate (Detrol) 1 Mg Tablet      1 MG PO QDAY, TAB         Reported         1/2/20       Levothyroxine (Synthroid) 0.125 Mg      0.125 MG PO QDAY@07, #30 TAB 0 Refills         Reported         1/2/20       Gabapentin (Gabapentin) 300 Mg Capsule      300 MG PO QDAY PRN for JOINT PAIN, #60 CAP 0 Refills         Reported         1/2/20       Diclofenac Potassium (Diclofenac Potassium) 50 Mg Tablet      50 MG PO QDAY, #60 TAB 0 Refills         Reported         1/2/20       Albuterol (Proair HFA) 8.5 Gm Inh      2 PUFFS INH RTQ6H, #1 INH 0 Refills         Reported         9/24/18       Estrogen,Conj-Medroxyprogest 0.3-1.5 Mg (Prempro 0.3-1.5 MG) 1 Each Tablet      1 TAB PO QDAY, #30 TAB 0 Refills         Reported         9/24/18       Montelukast Sodium (Montelukast*) 10 Mg Tablet      10 MG PO QDAY, TAB         Reported         9/24/18       Desloratadine/Pseudoephedrine 120/2.5 Mg (CLARINEX-D 12 HOUR TABLET) 1 Each     Tbmp.12hr      1 TAB PO BID for 30 Days, #60 TAB         Reported         9/24/18      Current Medications      Current Medications Reviewed 1/11/21            EXAM      Vital Signs Reviewed      Gen: WDWN, Alert, NAD.        HEENT:  PERRL, EOMI.  OP, nares clear, no sinus tenderness.      Neck:  Supple, no JVD, no thyromegaly.      Lymph: No axillary, cervical, supraclavicular lymphadenopathy noted bilaterally.      Chest:  Good aeration, clear to auscultation bilaterally, tympanic to percussion    bilaterally, no work of breathing noted.       CV:  RRR, no MGR, pulses 2+, equal.      Abd:  Soft, NT, ND, + BS, no HSM.      EXT:  No clubbing, no cyanosis, no edema, no joint tenderness.       Neuro:  A  Skin: No rashes or lesions.      Vtials      Vitals:             Height 5 ft 6 in / 167.64 cm           Weight 212 lbs 4 oz / 96.214987 kg           BSA 2.05 m2           BMI 34.3 kg/m2           Temperature 98.4 F / 36.89 C - Temporal           Pulse 82           Respirations 16           Blood Pressure 138/67 Sitting, Left Arm           Pulse Oximetry 98%, Room air            REVIEW      Results Reviewed      [facility] Results Reviewed?:  Yes Prev Lab Results, Yes Prev Radiology Results, Yes     Previous [facility] Records            Assessment      Notes      New Medications      * AZELASTINE/FLUTICASONE (Dymista Nasal [place]) 23 GM SPRAY.PUMP: 1 SPRAYS NARE       EACH BID #3      * TIOTROPIUM BROMIDE (Spiriva Respimat 2.5 mcg/Puff) 4 GM MIST.INHAL: 2 PUFFS       INH QDAY #3      * Fluticasone/Salmeterol 230/21 (Advair /21 MCG) 12 GM HFA.AER.AD: 2 PUFF      INH RTBID #3      * Montelukast Sodium (Singulair*) 10 MG TAB: 10 MG PO HS #90      ASSESSMENT:       1.  Severe persistent uncontrolled asthma, patient on triple inhaler therapy and    dupixent injections.      2.  Nasal polyps.      3. Allergic rhinitis/seasonal allergies.      4. Dyspnea.      5. Elevated IgE level at 44.      6. Tobacco abuse with cigarettes in remission.               PLAN:      1.  The patient to continue Singulair and Dymista everyday as prescribed.      2.  The patient to continue Advair and Spiriva everyday as prescribed and rinse     her mouth out after each use. instructed to use twice daily as prescribed      3. The patient to continue albuterol inhaler and DuoNebs as needed.      4. all meds refilled today      5.  The patient to continue Dupixent injections as scheduled.      6. Patient is advised to call the office, 911 or go to the ER with any new or     worsening  symptoms.      7. Up-to-date with flu and Pneumovax.      8.  Follow up with me in three months, sooner if needed.            Patient Education      Education resources provided:  Yes      Patient Education Provided:  Acute Asthma, Lung Cancer            Electronically signed by Hank Vee  01/20/2021 08:10       Disclaimer: Converted document may not contain table formatting or lab diagrams. Please see [facility] for the authenticated document.

## 2021-05-28 NOTE — PROGRESS NOTES
Patient: JDUY HERNANDEZ     Acct: [id number]     Report: #[id number]  UNIT #: [id number]     : 1960    Encounter Date:2020  PRIMARY CARE: SALINA PARK  ***Signed***  --------------------------------------------------------------------------------------------------------------------  TELEHEALTH NOTE      History of Present Illness      Chief Complaint: (SOA; Cough; Congestion)            Judy Hernandez is presenting for evaluation via Telehealth visit. Verbal     consent obtained before beginning visit.            Provider spent 14 minutes with the patient during telehealth visit.            The following staff were present during the visit: (Carlyn Gaytan MA and Jacque IASACS)            The patient is a 59 year old female, patient of Dr. Vee's with a history of     asthma, allergic rhinitis and nasal polyps  who presents for Telehealth visit     today secondary to increasing shortness of air and cough. The patient states t    hat symptoms started a week ago. The patient states at first she started out     having a sore throat and body aches and now she is having increasing shortness     of air and cough. The patient denies any fever or chills, night sweats,     hemoptysis, purulent sputum production, swollen glands in head and neck,     unintentional weight loss, chest pain or chest tightness, abdominal pain, nausea    or vomiting or diarrhea. The patient denies any headaches, myalgias, changes in     sense of taste and/or smell and any other coronavirus or flu like symptoms. The     patient denies any recent travel or any exposure to any ill contacts. The     patient states she and her  have gone to [facility] and to the [facility] and other places. The patient is concerned that she may have had     potential COVID-19 exposure and would like to be tested for COVID-19. The     patient states she is taking Advair and Spiriva everyday as prescribed and uses      her DuoNeb as needed. The patient states she is taking Singulair and Claritin D     and Dymista for allergy symptoms. The patient states she is able to perform all     her activities of daily living without difficulty.             I reviewed the Review of Systems, medical, surgical and family history and agree    with those as entered.               Physical exam deferred due to telehealth visit.                         Past Med History      Hx Influenza Vaccination:  Yes      Date Influenza Vaccine Given:  Nov 1, 2019      Influenza Vaccine Declined:  No      2 or More Falls Past Year?:  No      Fall Past Year with Injury?:  No      Hx Pneumococcal Vaccination:  Yes      Social History:  No Tobacco Use, No Alcohol Use, No Recreational Drug use      Smoking status:  Former smoker (.5 ppd 4 years, quit 1995)      Encouraged to follow-up with:  PCP regarding preventative exams.      Chart initiated by: Carlyn Gaytan MA      Overview of Symptoms      SOA; Congestion; Cough;            Allergies/Medications      Allergies:        Coded Allergies:             CODEINE (Verified  Allergy, Intermediate, Headaches, 5/14/20)      Medications    Last Reconciled on 5/14/20 10:52 by KAITY HERNANDEZ       predniSONE (predniSONE) 20 Mg Tablet      40 MG PO QDAY for 7 Days, #14 TAB 0 Refills         Prov: [facility]         5/14/20       Sodium Chloride ([place]cean Nasal Spray) 104 Ml Spray      2 SPRAYS NARE EACH TID, #3 BOTTLE 4 Refills         Prov: Hank Vee         4/7/20       Neomycin/Bacitracin/Polymyxinb (Triple Antibiotic Ointment) 14.17 Gm Oint...g.      1 APL TOPICAL QDAY, #3 TUBE         Prov: Hank Vee         4/7/20       predniSONE (predniSONE) 20 Mg Tablet      20 MG PO QDAY, #20 TAB 0 Refills         Prov: Hank Vee         4/7/20       Oxymetazoline Hcl (AFRIN) 15 Ml Spray      1 SPRAYS NARE EACH BID PRN for CONGESTION, #1 BOTTLE 3 Refills         Prov: Hank Vee         4/7/20        Omeprazole (Omeprazole*) 40 Mg Capsule      40 MG PO BID, #60 CAP 2 Refills         Reported         3/5/20       Tiotropium Bromide (Spiriva Respimat 2.5 mcg/Puff) 4 Gm Mist.inhal      2 PUFFS INH RTQDAY for 90 Days, #3 MDI 2 Refills         Prov: KAITY HERNANDEZ [facility]         3/5/20       Fluticasone/Salmeterol 230/21 (Advair /21 MCG) 12 Gm Hfa.aer.ad      2 PUFF INH RTBID, #3 INH 4 Refills         Prov: KAITY HERNANDEZ [facility]         3/5/20       Albuterol/Ipratropium (Duoneb) 3 Ml Ampul.neb      3 ML INH Q4H PRN for SHORTNESS OF BREATH, #120 NEB 5 Refills         Prov: KAITY HERNANDEZ [facility]         3/5/20       Tolterodine Tartrate (Detrol) 1 Mg Tablet      1 MG PO QDAY, TAB         Reported         1/2/20       Levothyroxine (Synthroid) 0.125 Mg      0.125 MG PO QDAY@07, #30 TAB 0 Refills         Reported         1/2/20       Gabapentin (Gabapentin) 300 Mg Capsule      300 MG PO QDAY PRN for JOINT PAIN, #60 CAP 0 Refills         Reported         1/2/20       Diclofenac Potassium (Diclofenac Potassium) 50 Mg Tablet      50 MG PO QDAY, #60 TAB 0 Refills         Reported         1/2/20       Azelastine/Fluticasone (Dymista Nasal Spray) 23 Gm Spray.pump      1 SPRAYS NARE EACH BID, #3 BOTTLE 3 Refills         Prov: Hank Vee         2/18/19       Albuterol (Proair HFA) 8.5 Gm Inh      2 PUFFS INH RTQ6H, #1 INH 0 Refills         Reported         9/24/18       Estrogen,Conj-Medroxyprogest 0.3-1.5 Mg (Prempro 0.3-1.5 MG) 1 Each Tablet      1 TAB PO QDAY, #30 TAB 0 Refills         Reported         9/24/18       Montelukast Sodium (Montelukast*) 10 Mg Tablet      10 MG PO QDAY, TAB         Reported         9/24/18       Desloratadine/Pseudoephedrine 120/2.5 Mg (CLARINEX-D 12 HOUR TABLET) 1 Each     Tbmp.12hr      1 TAB PO BID for 30 Days, #60 TAB         Reported         9/24/18            Plan/Instructions      Ambulatory Assessment/Plan:        Dyspnea - R06.00            Cough - R05            Notes       New Medications      * predniSONE 20 MG TABLET: 40 MG PO QDAY 7 Days #14      Discontinued Medications      * Amoxicillin/Clavulanic Acid 875/125 (Augmentin 875/125) 1 EACH TABLET: 875 MG       PO BID #20      New Diagnostics      * Chest 2 View, 1 DAY         Dx: Dyspnea - R06.00      * CBC With Auto Diff, Routine         Dx: Dyspnea - R06.00      * Sputum Culture W/Gram Stain, Routine         Dx: Dyspnea - R06.00      Plan/Instructions      * Plan Of Care: ()            * Chronic conditions reviewed and taken into consideration for today's treatment       plan.      * Patient instructed to seek medical attention urgently for new or worsening       symptoms.      * Patient was educated/instructed on their diagnosis, treatment and medications       prior to discharge from the clinic today.            ASSESSMENT:      1. Persistent asthma with acute exacerbation.       2. Pulmonary nodule.       3. Allergic rhinitis.       4. Chronic nasal obstruction.       5. Gastroesophageal reflux disease.       6. Dyspnea.       7. Cough.             PLAN:      1. The patient is concerned about potential COVID-19 exposure and would like a     referral to the [facility] clinic. I will refer the patient to [facility] clinic today to be     tested for COVID-19.       2. I will order a CBC, sputum culture and chest x-ray.       3. I will start the patient on a prednisone burst.       4. Continue Advair and Spiriva everyday as prescribed and rinse her mouth after     each use.  Continue DuoNeb and albuterol inhaler as needed.       5. Continue Singulair, Claritin D and Dymista everyday as prescribed and follow     up with ENT as scheduled.       6. The patient reports she is up to date with flu and pneumonia vaccines.       7. The patient will be due for repeat CT scan of the chest in October 2020 for     pulmonary nodule.       8. The patient is advised to call the office, call 911 or go to the ER for any     new or worsening symptoms.        9. I discussed with the patient that after she is tested today to remain     quarantined until she gets test results back. I also recommend that the patient     minimize her trips to the grocery store and pharmacy and have her groceries     delivered and minimize her contact with people during this pandemic. I also     recommend the patient continue to practice social distancing, wash hands     frequently for at least 20 seconds, and wearing a mask if she has to go in     public.       10. Follow up with Dr. Vee in 2 weeks sooner if needed.      Codes:  Phone Eval 11-20 mi [id number]            Electronically signed by KAITY HERNANDEZ [facility]  06/04/2020 11:07       Disclaimer: Converted document may not contain table formatting or lab diagrams. Please see [facility] for the authenticated document.

## 2021-05-28 NOTE — PROGRESS NOTES
Patient: TOBY HERNANDEZ     Acct: [id number]     Report: #[id number]  UNIT #: [id number]     : 1960    Encounter Date:2019  PRIMARY CARE: SALINA PARK  ***Signed***  --------------------------------------------------------------------------------------------------------------------  Chief Complaint      Encounter Date      Dec 11, 2019            Primary Care Provider      SALINA PARK            Referring Provider      [facility]            Patient Complaint      Patient is complaining of      Pt here for f/u, asthma            VITALS      Height 5 ft 6 in / 167.64 cm      Weight 208 lbs 8 oz / 94.646271 kg      BSA 2.04 m2      BMI 33.7 kg/m2      Temperature 97.9 F / 36.61 C - Oral      Pulse 70      Respirations 18      Blood Pressure 140/77 Sitting, Left Arm      Pulse Oximetry 99%, Room air      Initial Exhaled Nitrous Oxide      Exhaled Nitrous Oxide Results:  37            HPI      The patient is a 59 year old  female with history of asthma with    significant nasal polyps here for follow up today.            The patient does not like the trelegy ellipta and does not feel like it helped     with her symptoms and is requesting to go back on advair. She is using her     bronchodilator therapies inconsistently still. She has no worsening cough or     sputum production and no worsening shortness of breath. She has significant     nasal congestion which is consistent. She feels the dymista is helping with this    but no worsening of her symptoms since her last office visit.            ROS      Constitutional:  Denies: Fatigue, Fever, Weight gain, Weight loss, Chills,     Insomnia, Other      Respiratory/Breathing:  Complains of: Shortness of air, Wheezing, Cough; Denies:    Hemoptysis, Pleuritic pain, Other      Endocrine:  Denies: Polydipsia, Polyuria, Heat/cold intolerance, Abnorml m    enstrual pattern, Diabetes, Other      Eyes:  Denies: Blurred vision, Vision  Changes, Other      Ears, nose, mouth, throat:  Denies: Mouth lesions, Thrush, Throat pain,     Hoarseness, Allergies/Hay Fever, Post Nasal Drip, Headaches, Recent Head Injury,    Nose Bleeding, Neck Stiffness, Thyroid Mass, Hearing Loss, Ear Fullness, Dry     Mouth, Nasal or Sinus Pain, Dry Lips, Nasal discharge, Nasal congestion, Other      Cardiovascular:  Denies: Palpitations, Syncope, Claudication, Chest Pain, Wake     up Gasping for air, Leg Swelling, Irregular Heart Rate, Cyanosis, Dyspnea on     Exertion, Other      Gastrointestinal:  Denies: Nausea, Constipation, Diarrhea, Abdominal pain,     Vomiting, Difficulty Swallowing, Reflux/Heartburn, Dysphagia, Jaundice,     Bloating, Melena, Bloody stools, Other      Genitourinary:  Denies: Urinary frequency, Incontinence, Hematuria, Urgency,     Nocturia, Dysuria, Testicular problems, Other      Musculoskeletal:  Denies: Joint Pain, Joint Stiffness, Joint Swelling, Myalgias,    Other      Hematologic/lymphatic:  DENIES: Lymphadenopathy, Bruising, Bleeding tendencies,     Other      Neurological:  Denies: Headache, Numbness, Weakness, Seizures, Other      Psychiatric:  Denies: Anxiety, Appropriate Effect, Depression, Other      Sleep:  No: Excessive daytime sleep, Morning Headache?, Snoring, Insomnia?, Stop    breathing at sleep?, Other      Integumentary:  Denies: Rash, Dry skin, Skin Warm to Touch, Other      Immunologic/Allergic:  Denies: Latex allergy, Seasonal allergies, Asthma,     Urticaria, Eczema, Other      Immunization status:  No: Up to date            FAMILY/SOCIAL/MEDICAL HX      Surgical History:  Yes: Other Surgeries      Stroke - Family Hx:  Grandparent      Heart - Family Hx:  Father      Diabetes - Family Hx:  Father      Cancer/Type - Family Hx:  Sister      Other Family Medical History:  Father      Is Father Still Living?:  No      Is Mother Still Living?:  Yes      Social History:  No Tobacco Use, No Alcohol Use, No Recreational Drug use       Smoking status:  Former smoker (.5 ppd 4 years, quit 1995)      Anticoagulation Therapy:  No      Antibiotic Prophylaxis:  No      Medical History:  Yes: Allergies, Asthma, Anxiety, Reflux Disease, Thyroid     Problem, Miscellaneous Medical/oth (sleep apnea); No: Sinus Trouble      Psychiatric History      Anxiety            PREVENTION      Hx Influenza Vaccination:  Yes      Date Influenza Vaccine Given:  Nov 1, 2019      Influenza Vaccine Declined:  No      2 or More Falls Past Year?:  No      Fall Past Year with Injury?:  No      Hx Pneumococcal Vaccination:  Yes      Encouraged to follow-up with:  PCP regarding preventative exams.      Chart initiated by      Barbara Purcell MA            ALLERGIES/MEDICATIONS      Allergies:        Coded Allergies:             CODEINE (Verified  Allergy, Intermediate, Headaches, 12/11/19)      Medications    Last Reconciled on 12/11/19 09:57 by HANK LEACH MD      ([facility])   No Conflict Check               Reported         6/5/19       Fluticasone/Umeclidin/Vilanter (Trelegy Ellipta 100-62.5-25) 1 Each Blst.w.dev      1 PUFF INH RTQDAY, #3 INH 4 Refills         Prov: Hank Leach         2/18/19       Azelastine/Fluticasone (Dymista Nasal Spray) 23 Gm Spray.pump      1 SPRAYS NARE EACH BID, #3 BOTTLE 3 Refills         Prov: Hank Leach         2/18/19       Pantoprazole (Protonix*) 40 Mg Tablet.dr      40 MG PO HS, #90 TAB 4 Refills         Prov: Hank Leach         10/26/18       NEB-Albuterol Sulf (Albuterol Sulfate) 5 Mg/1 Ml Solution      2.5 MG INH Q6H PRN for SHORTNESS OF BREATH, #3 BOTTLE 0 Refills         Reported         9/24/18       Albuterol (Proair HFA) 8.5 Gm Inh      2 PUFFS INH RTQ6H, #1 INH 0 Refills         Reported         9/24/18       CPAP Compressor (CPAP) 1 Each Each      EACH XX ONCE, #1 0 Refills         Reported         9/24/18       Mupirocin (Bactroban 2% Oint) 22 Gm Oint...g.      1 APL TOPICAL BID, #1 TUBE         Reported          9/24/18       Nebulizer/Compressor (Nebulizer) 1 Each Each      EACH XX ONCE, #1 0 Refills         Reported         9/24/18       MDI-Advair 500/50 (Advair 500/50 Diskus) 1 Each Blst.w.dev      1 PUFF INH RTBID, #1 INH         Reported         9/24/18       Estrogen,Conj-Medroxyprogest 0.3-1.5 Mg (Prempro 0.3-1.5 MG) 1 Each Tablet      1 TAB PO QDAY, #30 TAB 0 Refills         Reported         9/24/18       Montelukast Sodium (Montelukast*) 10 Mg Tablet      10 MG PO QDAY, TAB         Reported         9/24/18       Levothyroxine (Synthroid) Unknown Strength Mg      PO QDAY, #30 TAB 0 Refills         Reported         9/24/18       Buspirone Hcl (Buspar) 15 Mg Tablet      7.5 MG PO QDAY, #30 TAB         Reported         9/24/18       Desloratadine/Pseudoephedrine 120/2.5 Mg (CLARINEX-D 12 HOUR TABLET) 1 Each     Tbmp.12hr      1 TAB PO BID for 30 Days, #60 TAB         Reported         9/24/18       Aspirin Chew (Aspirin Baby) 81 Mg Tab.chew      81 MG PO QDAY, #30 TAB.CHEW 0 Refills         Reported         9/24/18       Folic Acid/Vit Bcomp,C (Super B-Complex Folic-Vit C Tb) 400 Mcg Tablet      1 TAB PO QDAY, TAB         Reported         9/24/18       Meclizine Hcl (Meclizine*) 25 Mg Tablet      25 MG PO TID, #90 TAB 0 Refills         Reported         9/24/18      Current Medications      Current Medications Reviewed 12/11/19            EXAM      GEN-patient appears stated age resting comfortable in no acute distress      Eyes-PERRL,  conjunctiva are normal in appearance extraocular muscles are     intact, no scleral icterus      Nasal-the patient has severely enlarged turbinate bilaterally with near complete    obstruction of the left nasal passage, no polyps seen no nasal discharge or     ulcerations      Lymphatic-no swollen or enlarged cervical nodes, or axillary node, or femoral     nodes, or supraclavicular nodes      Mouth normal dentition, no erythema no ulcerations oropharynx appears normal no     exudate no  evidence of postnasal drip, MP II      Neck-there are no palpable supraclavicular or cervical adenopathy, thyroid is     normal in appearance no apparent nodules, there is no inspiratory or expiratory     stridor      Respiratory-patient exhibits normal work of breathing, speaking in full     sentences without difficulty, the chest is normal in appearance, clear to     auscultation with no wheezes rales or rhonchi, chest is normal to percussion on     both the right and left sides      Cardiovascular-the heart rate is normal and regular S1 and S2 present with no     murmur or extra heart sounds, there is no JVD or pedal edema present      GI-the abdomen is normal in appearance, bowel sounds present and normal in all     quadrants no hepatosplenomegaly or masses felt      Extremities-no clubbing is present, pulses present in all extremities, capillary    refill time is normal      Skin-skin is normal in appearance it is warm and dry, no rashes present, no     evidence of cyanosis, palpation reveals no masses      Neurological-the patient is alert and oriented to time place and person, moves     all 4 extremities, normal gait, normal affect and mood, CN2-12 intact      Psych-normal judgment and insight is good, normal mood and affect, alert and     oriented to person, place, and time, and date      Vtials      Vitals:             Height 5 ft 6 in / 167.64 cm           Weight 208 lbs 8 oz / 94.874020 kg           BSA 2.04 m2           BMI 33.7 kg/m2           Temperature 97.9 F / 36.61 C - Oral           Pulse 70           Respirations 18           Blood Pressure 140/77 Sitting, Left Arm           Pulse Oximetry 99%, Room air            REVIEW      Results Reviewed      PCCS Results Reviewed?:  Yes Prev Lab Results, Yes Prev Radiology Results, Yes     [facility]            Assessment      Notes      New Medications      * Montelukast Sodium (Singulair*) 10 MG TAB: 10 MG PO HS #90      *  Fluticasone/Salmeterol 230/21 (Advair /21 MCG) 12 GM HFA.AER.AD: 2 PUFF      INH RTBID #3      * Fluticasone/Salmeterol 230/21 (Advair /21 MCG) 12 GM HFA.AER.AD: 2 PUFF      INH RTBID #3      * Montelukast Sodium (Singulair*) 10 MG TAB: 10 MG PO HS #90      * Fluticasone/Salmeterol 230/21 (Advair /21 MCG) 12 GM HFA.AER.AD: 2 PUFF      INH RTBID #3      * SPACER (Spacer) 1 EACH EACH: EACH XX ONCE #1         Instructions: Use as directed with inhalers      * Sodium Chloride (Ocean Nasal [place]) 104 ML SPRAY: 2 SPRAYS NARE EACH TID #3      * TIOTROPIUM BROMIDE (Spiriva Respimat 2.5 mcg/Puff) 4 GM MIST.INHAL: 2 PUFFS       INH QDAY #4      New Office Procedures      * Prevnar-13, As Soon As Possible         Pneumoc 13-Betty Conj-Dip CRm/Pf (Prevnar 13 Syringe) 0.5 ML SYRINGE: 0.5 MIL       LILITER INTRAMUSCULARLY Qty 1 SYRINGE      ASSESSMENT/PLAN:      1.  Pulmonary nodule.  Repeat CT scan in 10/2020.      2.  Persistent asthma, controlled, DC Trelegy.  restart advair HFA and spirivia     respimat Continue prn albuterol.      3. Allergic rhinitis.  cont  Stahist.  Continue Dymista, Singulair and     antihistamine, ENT following      4.  Chronic nasal obstruction.        5.  PCV 13 today in the office has had Flu vaccine  already this year            Patient Education      Education resources provided:  Yes      Patient Education Provided:  Acute Asthma            Electronically signed by Hank Vee  12/17/2019 13:16       Disclaimer: Converted document may not contain table formatting or lab diagrams. Please see [employer] for the authenticated document.

## 2021-05-28 NOTE — PROGRESS NOTES
Patient: TOBY HERNANDEZ     Acct: [id number]     Report: #[id number]  UNIT #: [id number]     : 1960    Encounter Date:2021  PRIMARY CARE: SALINA PARK  ***Signed***  --------------------------------------------------------------------------------------------------------------------  Chief Complaint      Encounter Date      May 19, 2021            Primary Care Provider      SALINA PARK            Referring Provider      SALINA PARK            Patient Complaint      Patient is complaining of      Patient is here for follow up            VITALS      Height 5 ft 6 in / 167.64 cm      Weight 212 lbs 8 oz / 96.550430 kg      BSA 2.05 m2      BMI 34.3 kg/m2      Temperature 97.2 F / 36.22 C - Tympanic      Pulse 80      Respirations 16      Blood Pressure 125/55 Sitting, Left Arm      Pulse Oximetry 98%, room air            HPI      The patient is a pleasant 60 year old female who is here today for follow up.      She feels that her asthma is now well-controlled, uses her Dupixent.  She no     longer uses the Spiriva because she is having significant mouth dryness.  She     has been suffering from laryngitis and has seen ENT. ENT felt she had a yeast     infection in her vocal cords and was having a significant amount of reflux. She     has subsequently been back on her Prilosec and her laryngitis has improved. She     was having some issues with cough as well, but since being on an antireflux     medication her cough is now subsided. She has no nocturnal symptoms at this     time, uses her rescue inhaler approximately one time per month, sometimes less.     She does all of her ADLs without having any significant difficulties.  No fev    ers, chills or night sweats and no worsening dyspnea.            ROS      Constitutional:  Denies: Fatigue, Fever, Weight gain, Weight loss, Chills,     Insomnia, Other      Respiratory/Breathing:  Complains of: Cough; Denies: Shortness of air, Wheezing,     Hemoptysis, Pleuritic pain, Other      Endocrine:  Denies: Polydipsia, Polyuria, Heat/cold intolerance, Abnorml     menstrual pattern, Diabetes, Other      Eyes:  Denies: Blurred vision, Vision Changes, Other      Ears, nose, mouth, throat:  Denies: Mouth lesions, Thrush, Throat pain,     Hoarseness, Allergies/Hay Fever, Post Nasal Drip, Headaches, Recent Head Injury,    Nose Bleeding, Neck Stiffness, Thyroid Mass, Hearing Loss, Ear Fullness, Dry     Mouth, Nasal or Sinus Pain, Dry Lips, Nasal discharge, Nasal congestion, Other      Cardiovascular:  Denies: Palpitations, Syncope, Claudication, Chest Pain, Wake     up Gasping for air, Leg Swelling, Irregular Heart Rate, Cyanosis, Dyspnea on     Exertion, Other      Gastrointestinal:  Denies: Nausea, Constipation, Diarrhea, Abdominal pain,     Vomiting, Difficulty Swallowing, Reflux/Heartburn, Dysphagia, Jaundice,     Bloating, Melena, Bloody stools, Other      Genitourinary:  Denies: Urinary frequency, Incontinence, Hematuria, Urgency,     Nocturia, Dysuria, Testicular problems, Other      Musculoskeletal:  Denies: Joint Pain, Joint Stiffness, Joint Swelling, Myalgias,    Other      Hematologic/lymphatic:  DENIES: Lymphadenopathy, Bruising, Bleeding tendencies,     Other      Neurological:  Denies: Headache, Numbness, Weakness, Seizures, Other      Psychiatric:  Denies: Anxiety, Appropriate Effect, Depression, Other      Sleep:  No: Excessive daytime sleep, Morning Headache?, Snoring, Insomnia?, Stop    breathing at sleep?, Other      Integumentary:  Denies: Rash, Dry skin, Skin Warm to Touch, Other      Immunologic/Allergic:  Denies: Latex allergy, Seasonal allergies, Asthma,     Urticaria, Eczema, Other      Immunization status:  No: Up to date            FAMILY/SOCIAL/MEDICAL HX      Surgical History:  Yes: Bowel Surgery (COLONOSCOPY), Other Surgeries      Stroke - Family Hx:  Grandparent      Heart - Family Hx:  Father      Diabetes - Family Hx:  Father       Cancer/Type - Family Hx:  Sister      Other Family Medical History:  Father      Is Father Still Living?:  No      Is Mother Still Living?:  Yes      Smoking status:  Former smoker ((.5 ppd 4 years, quit 1995))      Anticoagulation Therapy:  No      Antibiotic Prophylaxis:  No      Medical History:  Yes: Arthritis (LOWER BACK), Asthma (INHALER), Anxiety,     Hemorrhoids/Rectal Prob (WEAK BLADDER), Reflux Disease, Shortness Of Breath,     Thyroid Problem, Miscellaneous Medical/oth (sleep apnea-HYSTERECTOMY SCHEDULED     1/2020); No: Blood Disease, Chemotherapy/Cancer, Deafness or Ringing Ears, Sinus    Trouble            PREVENTION      Hx Influenza Vaccination:  Yes      Date Influenza Vaccine Given:  Oct 1, 2020      Influenza Vaccine Declined:  No      2 or More Falls in Past Year?:  No      Fall Past Year with Injury?:  No      Hx Pneumococcal Vaccination:  Yes      Encouraged to follow-up with:  PCP regarding preventative exams.      Chart initiated by      Beth Ng CMA            ALLERGIES/MEDICATIONS      Allergies:        Coded Allergies:             CODEINE (Verified  Allergy, Intermediate, Headaches, 5/19/21)      Medications    Last Reconciled on 5/19/21 09:12 by RACHEL VEE MD      Azelastine/Fluticasone (Dymista Nasal Spray) 23 Gm Spray.pump      1 SPRAYS NARE EACH BID for 90 Days, #3 BOTTLE 3 Refills         Prov: Rachel Vee         4/30/21       (vitamen c)   No Conflict Check               Reported         2/17/21       Prenatal Vit#96/Ferrous Fum/Fa (Prenatal Tablet*) 1 Tab Tablet      1 TAB PO QDAY, #30 TAB 0 Refills         Reported         2/17/21       Estradiol (Menostar Patch) 1 Each Patch.tdwk      1 PATCH.WK TD 2x/week, PATCH         Reported         2/17/21       Montelukast Sodium (Singulair*) 10 Mg Tab      10 MG PO HS, #90 TAB 4 Refills         Prov: Rachel Vee         1/11/21       Fluticasone/Salmeterol 230/21 (Advair /21 MCG) 12 Gm Hfa.aer.ad      2 PUFF INH  RTBID, #3 INH 4 Refills         Prov: Hank Vee         1/11/21       Dupilumab (Dupixent) 200 Mg/1.14 Ml Syringe      200 MG SUBQ [id number], #2 SYRINGE 11 Refills         Prov: JERALD HERNANDEZT [facility]         7/22/20       Metoprolol Succinate (Metoprolol Succinate) 25 Mg Tab.er.24h      25 MG PO QDAY, #30 TAB 0 Refills         Reported         7/8/20       Omeprazole (Omeprazole*) 40 Mg Capsule      40 MG PO BID, #60 CAP 2 Refills         Reported         3/5/20       Albuterol/Ipratropium (Duoneb) 3 Ml Ampul.neb      3 ML INH Q4H PRN for SHORTNESS OF BREATH, #120 NEB 5 Refills         Prov: MARYKAITY [facility]         3/5/20       Levothyroxine (Synthroid) 0.125 Mg      0.125 MG PO QDAY@07, #30 TAB 0 Refills         Reported         1/2/20       Gabapentin (Gabapentin) 300 Mg Capsule      300 MG PO QDAY PRN for JOINT PAIN, #60 CAP 0 Refills         Reported         1/2/20       Diclofenac Potassium (Diclofenac Potassium) 50 Mg Tablet      50 MG PO QDAY, #60 TAB 0 Refills         Reported         1/2/20       Albuterol (Proair HFA) 8.5 Gm Inh      2 PUFFS INH RTQ6H, #1 INH 0 Refills         Reported         9/24/18       Desloratadine/Pseudoephedrine 120/2.5 Mg (CLARINEX-D 12 HOUR TABLET) 1 Each     Tbmp.12hr      1 TAB PO BID for 30 Days, #60 TAB         Reported         9/24/18      Current Medications      Current Medications Reviewed 5/19/21            EXAM      Vital Signs Reviewed      Gen: WDWN, Alert, NAD.        HEENT:  PERRL, EOMI.  OP, nares clear, no sinus tenderness.      Neck:  Supple, no JVD, no thyromegaly.      Lymph: No axillary, cervical, supraclavicular lymphadenopathy noted bilaterally.      Chest:  Good aeration, clear to auscultation bilaterally, tympanic to percussion    bilaterally, no work of breathing noted.      CV:  RRR, no MGR, pulses 2+, equal.      Abd:  Soft, NT, ND, + BS, no HSM.      EXT:  No clubbing, no cyanosis, no edema, no joint tenderness.       Neuro:  A  Skin: No  rashes or lesions.      Vtials      Vitals:             Height 5 ft 6 in / 167.64 cm           Weight 212 lbs 8 oz / 96.679000 kg           BSA 2.05 m2           BMI 34.3 kg/m2           Temperature 97.2 F / 36.22 C - Tympanic           Pulse 80           Respirations 16           Blood Pressure 125/55 Sitting, Left Arm           Pulse Oximetry 98%, room air            REVIEW      Results Reviewed      PCCS Results Reviewed?:  Yes Prev Lab Results, Yes Prev Radiology Results, Yes     Previous [facility] Records            Assessment      Notes      New Medications      * Albuterol/Ipratropium (Duoneb) 3 ML AMPUL.NEB: 3 ML INH Q4H PRN SHORTNESS OF       BREATH #360         Instructions: DIAGNOSIS CODE REQUIRED PRIOR TO PRESCRIBING.      * Neomycin/Polymyx/Bacitracin (Triple Antibiotic Ointment) 14 GM OINT...G.: 1       APL TOPICAL BID #90      Discontinued Medications      * TIOTROPIUM BROMIDE (Spiriva Respimat 2.5 mcg/Puff) 4 GM MIST.INHAL: 2 PUFFS       INH QDAY #3      * predniSONE 20 MG TABLET: 20 MG PO BID #10      ASSESSMENT:       1.  Severe persistent uncontrolled asthma, patient on triple inhaler therapy and    dupixent injections-now controlled      2.  Nasal polyps-better      3. Allergic rhinitis/seasonal allergies.      4. Dyspnea-improved      5. Elevated IgE level-better      6. Tobacco abuse with cigarettes in remission.               PLAN:      1.  continue Singulair and Dymista everyday as prescribed.      2.  continue Advair and Spiriva everyday as prescribed and rinse her mouth out     after each use. instructed to use twice daily as prescribed      3.  continue albuterol inhaler and DuoNebs as needed.      4. all meds refilled today      5.  continue Dupixent injections as scheduled.      6. Patient is advised to call the office, 911 or go to the ER with any new or     worsening symptoms.      7. Up-to-date with flu and Pneumovax.      8. Has had both steps of the Pizer COVID 19 vaccine       Follow  up with me in three months, sooner if needed.            Patient Education      Education resources provided:  Yes      Patient Education Provided:  Acute Asthma            Electronically signed by Hank Vee  05/21/2021 12:45       Disclaimer: Converted document may not contain table formatting or lab diagrams. Please see [employer] for the authenticated document.

## 2021-05-28 NOTE — PROGRESS NOTES
Patient: TOBY HERNANDEZ     Acct: [id number]     Report: #[id number]  UNIT #: [id number]     : 1960    Encounter Date:2019  PRIMARY CARE: SALINA PARK  ***Signed***  --------------------------------------------------------------------------------------------------------------------  Chief Complaint      Encounter Date      2019            Primary Care Provider      SALINA PARK            Referring Provider      [facility]            Patient Complaint      Patient is complaining of      Pt here for f/u and results, cough            VITALS      Height 5 ft 6 in / 167.64 cm      Weight 203 lbs 5 oz / 92.219705 kg      BSA 2.01 m2      BMI 32.8 kg/m2      Temperature 97.7 F / 36.5 C - Oral      Pulse 56      Respirations 14      Blood Pressure 131/67 Sitting, Right Arm      Pulse Oximetry 99%, room air      Initial Exhaled Nitrous Oxide      Exhaled Nitrous Oxide Results:  37            HPI      The patient is a very pleasant 58 year old  female here today     for follow up.             The patient is having some mild shortness of breath, does not really feel the     Advair is helping. She uses her rescue inhaler frequently, several times a week.    She denies having any nocturnal symptoms. Shortness of breath is worse when she     is around certain fragrances, when she is in cold, and with exertion at certain     times, in particular, she has to walk long distances out when the air is cold.     She has episodic wheezing and feels her symptoms are relieved with the use of     her inhalers. She has no other associated symptoms at this time such as chest     pain, heart palpitations, lower extremity edema or orthopnea.            ROS      Constitutional:  Denies: Fatigue, Fever, Weight gain, Weight loss, Chills,     Insomnia, Other      Respiratory/Breathing:  Complains of: Shortness of air; Denies: Wheezing, Cough,    Hemoptysis, Pleuritic pain, Other      Endocrine:   Denies: Polydipsia, Polyuria, Heat/cold intolerance, Abnorml     menstrual pattern, Diabetes, Other      Eyes:  Denies: Blurred vision, Vision Changes, Other      Ears, nose, mouth, throat:  Denies: Mouth lesions, Thrush, Throat pain,     Hoarseness, Allergies/Hay Fever, Post Nasal Drip, Headaches, Recent Head Injury,    Nose Bleeding, Neck Stiffness, Thyroid Mass, Hearing Loss, Ear Fullness, Dry     Mouth, Nasal or Sinus Pain, Dry Lips, Nasal discharge, Nasal congestion, Other      Cardiovascular:  Denies: Palpitations, Syncope, Claudication, Chest Pain, Wake     up Gasping for air, Leg Swelling, Irregular Heart Rate, Cyanosis, Dyspnea on     Exertion, Other      Gastrointestinal:  Denies: Nausea, Constipation, Diarrhea, Abdominal pain,     Vomiting, Difficulty Swallowing, Reflux/Heartburn, Dysphagia, Jaundice,     Bloating, Melena, Bloody stools, Other      Genitourinary:  Denies: Urinary frequency, Incontinence, Hematuria, Urgency,     Nocturia, Dysuria, Testicular problems, Other      Musculoskeletal:  Denies: Joint Pain, Joint Stiffness, Joint Swelling, Myalgias,    Other      Hematologic/lymphatic:  DENIES: Lymphadenopathy, Bruising, Bleeding tendencies,     Other      Neurological:  Denies: Headache, Numbness, Weakness, Seizures, Other      Psychiatric:  Denies: Anxiety, Appropriate Effect, Depression, Other      Sleep:  No: Excessive daytime sleep, Morning Headache?, Snoring, Insomnia?, Stop    breathing at sleep?, Other      Integumentary:  Denies: Rash, Dry skin, Skin Warm to Touch, Other      Immunologic/Allergic:  Denies: Latex allergy, Seasonal allergies, Asthma,     Urticaria, Eczema, Other      Immunization status:  No: Up to date            FAMILY/SOCIAL/MEDICAL HX      Surgical History:  Yes: Other Surgeries      Stroke - Family Hx:  Grandparent      Heart - Family Hx:  Father      Diabetes - Family Hx:  Father      Cancer/Type - Family Hx:  Sister      Other Family Medical History:  Father      Is  Father Still Living?:  No      Is Mother Still Living?:  Yes      Smoking status:  Former smoker (.5 ppd 4 years, quit 1995)      Anticoagulation Therapy:  No      Antibiotic Prophylaxis:  No      Medical History:  Yes: Allergies, Asthma, Anxiety, Reflux Disease, Thyroid     Problem, Miscellaneous Medical/oth (sleep apnea); No: Sinus Trouble      Psychiatric History      Anxiety            PREVENTION      Hx Influenza Vaccination:  Yes      Date Influenza Vaccine Given:  Sep 1, 2018      Influenza Vaccine Declined:  No      2 or More Falls Past Year?:  No      Fall Past Year with Injury?:  No      Hx Pneumococcal Vaccination:  Yes      Encouraged to follow-up with:  PCP regarding preventative exams.      Chart initiated by      Barbara Purcell MA            ALLERGIES/MEDICATIONS      Allergies:        Coded Allergies:             CODEINE (Verified  Allergy, Intermediate, Headaches, 2/14/19)      Medications    Last Reconciled on 2/14/19 10:20 by HANK LEACH MD      Pantoprazole (Protonix*) 40 Mg Tablet.dr      40 MG PO HS, #90 TAB 4 Refills         Prov: Hank Leach         10/26/18       NEB-Albuterol Sulf (Albuterol Sulfate) 5 Mg/1 Ml Solution      2.5 MG INH Q6H PRN for SHORTNESS OF BREATH, #3 BOTTLE 0 Refills         Reported         9/24/18       Albuterol (Proair HFA) 8.5 Gm Inh      2 PUFFS INH RTQ6H, #1 INH 0 Refills         Reported         9/24/18       CPAP Compressor (CPAP) 1 Each Each      EACH XX ONCE, #1 0 Refills         Reported         9/24/18       Mupirocin (Bactroban 2% Oint) 22 Gm Oint...g.      1 APL TOPICAL BID, #1 TUBE         Reported         9/24/18       Nebulizer/Compressor (Nebulizer) 1 Each Each      EACH XX ONCE, #1 0 Refills         Reported         9/24/18       MDI-Advair 500/50 (Advair 500/50 Diskus) 1 Each Blst.w.dev      1 PUFF INH RTBID, #1 INH         Reported         9/24/18       Estrogen,Con/M-Progest Acet (Prempro 0.3 MG/1.5 MG) 1 Each Tablet      1 TAB PO QDAY, #30  TAB 0 Refills         Reported         9/24/18       Montelukast Sodium (Montelukast*) 10 Mg Tablet      10 MG PO QDAY, TAB         Reported         9/24/18       Levothyroxine (Synthroid) Unknown Strength Mg      PO QDAY, #30 TAB 0 Refills         Reported         9/24/18       Buspirone Hcl (Buspar) 15 Mg Tablet      7.5 MG PO QDAY, #30 TAB         Reported         9/24/18       Desloratadine/Pseudoephedrine 120/2.5 Mg (CLARINEX-D 12 HOUR TABLET) 1 Each     Tbmp.12hr      1 TAB PO BID for 30 Days, #60 TAB         Reported         9/24/18       Aspirin Chew (Aspirin Baby) 81 Mg Tab.chew      81 MG PO QDAY, #30 TAB.CHEW 0 Refills         Reported         9/24/18       Folic Acid/Vit Bcomp,C (Super B-Complex Folic-Vit C Tb) 400 Mcg Tablet      1 TAB PO QDAY, TAB         Reported         9/24/18       Meclizine Hcl (Meclizine*) 25 Mg Tablet      25 MG PO TID, #90 TAB 0 Refills         Reported         9/24/18      Current Medications      Current Medications Reviewed 2/14/19            EXAM      GEN-patient appears stated age resting comfortable in no acute distress      Eyes-PERRL,  conjunctiva are normal in appearance extraocular muscles are     intact, no scleral icterus      Nasal-both nares are patent turbinates appear to be severely swollen     bilaterally,  no polyps seen no nasal discharge or ulcerations      Lymphatic-no swollen or enlarged cervical nodes, or axillary node, or femoral     nodes, or supraclavicular nodes      Mouth normal dentition, no erythema no ulcerations oropharynx appears normal no     exudate no evidence of postnasal drip, MP II      Neck-there are no palpable supraclavicular or cervical adenopathy, thyroid is     normal in appearance no apparent nodules, there is no inspiratory or expiratory     stridor      Respiratory-patient exhibits normal work of breathing, speaking in full     sentences without difficulty, the chest is normal in appearance, clear to     auscultation with no  wheezes rales or rhonchi, chest is normal to percussion on     both the right and left sides      Cardiovascular-the heart rate is normal and regular S1 and S2 present with no     murmur or extra heart sounds, there is no JVD or pedal edema present      GI-the abdomen is normal in appearance, bowel sounds present and normal in all     quadrants no hepatosplenomegaly or masses felt      Extremities-no clubbing is present, pulses present in all extremities, capillary    refill time is normal      Skin-skin is normal in appearance it is warm and dry, no rashes present, no     evidence of cyanosis, palpation reveals no masses      Neurological-the patient is alert and oriented to time place and person, moves     all 4 extremities, normal gait, normal affect and mood, CN2-12 intact      Psych-normal judgment and insight is good, normal mood and affect, alert and     oriented to person, place, and time, and date      Vtials      Vitals:             Height 5 ft 6 in / 167.64 cm           Weight 203 lbs 5 oz / 92.151204 kg           BSA 2.01 m2           BMI 32.8 kg/m2           Temperature 97.7 F / 36.5 C - Oral           Pulse 56           Respirations 14           Blood Pressure 131/67 Sitting, Right Arm           Pulse Oximetry 99%, room air            REVIEW      Results Reviewed      P[facility] Results Reviewed?:  Yes Prev Lab Results, Yes Prev Radiology Results, Yes     Previous [facility] Records            Assessment      Notes      New Medications      * TIOTROPIUM BROMIDE (Spiriva Respimat 1.25 mcg/puff) 4 GM MIST.INHAL: 2 PUFFS       INH QDAY #4      * Fluticasone/Umeclidin/Vilanter (Trelegy Ellipta 100-62.5-25) 1 EACH       BLST.W.DEV: 1 PUFF INH RTQDAY #3      * AZELASTINE/FLUTICASONE (Dymista Nasal [place]) 23 GM SPRAY.PUMP: 1 SPRAYS NARE       EACH BID #3      * Fluticasone/Umeclidin/Vilanter (Trelegy Ellipta 100-62.5-25) 1 EACH       BLST.W.DEV: 1 PUFF INH RTQDAY #3      New Diagnostics      * Chest W/O Cont CT,  10/14/20      ASSESSMENT/PLAN:      1. Pulmonary nodule. Repeat CT scan in October 2020 based on current Fleischner     criteria guidelines.       2. Moderate persistent asthma. Continue Advair and we will add Spiriva Respimat.    If the patient has [employer] we can try trelegy given convenience and     having to only take 1 puff of 1 medication based upon the patient's request.       3. Allergic rhinitis. Patient given a script for Dymista today.       4. I have personally reviewed laboratory data, imaging as well as previous     medical records.            Patient Education      Education resources provided:  Yes      Patient Education Provided:  Sleep Apnea                 Disclaimer: Converted document may not contain table formatting or lab diagrams. Please see [employer] [facility] for the authenticated document.

## 2021-05-28 NOTE — PROGRESS NOTES
Patient: UJDY HERNANDEZ     Acct: [id number]     Report: #[id number]  UNIT #: [id number]     : 1960    Encounter Date:2020  PRIMARY CARE: SALINA PARK  ***Signed***  --------------------------------------------------------------------------------------------------------------------  TELEHEALTH NOTE      History of Present Illness            Chief Complaint: Pt being seen for f/u            Judy Hernandez is presenting for evaluation via Telehealth visit. Verbal     consent obtained before beginning visit.            Provider spent (number of mins) minutes with the patient during telehealth     visit.            The following staff were present during the visit: Barbara Purcell MA, Hank Vee DO            The patient is a very pleasant 59 year old -American female who has a     history of asthma, history of allergic rhinitis and history of nasal polyps and     is here today for follow up.  The patient was last seen by FERNY Quinn on 2020.  She is complaining of some nasal congestion as well as some    headaches.  She states that she thinks she is getting sinusitis. The patient     states this time of the year is bad for her with the tree pollen being very     high.  She reports compliance with the Dymista.  She reports compliance with her    other medications that she is taking for her allergies.  She takes Singulair as     well as occasional nasal spray.  The patient denies having any fevers, does have    thick greenish discharge from her nares on both sides, symptoms appear to be     worse on the left side. She does have some mild headaches which she describes as    a pressure sensation located over her frontal sinuses.  She has been treated for    sinusitis in the past.  She does follow up with ENT in regards to her nasal     polyps. In regards to her asthma, she feels her asthma is at her baseline. She     has no increased cough, increased sputum  production, increased wheezing or     worsening shortness of breath over her baseline.  The patient does have numerous    questions today concerning the coronavirus and this current pandemic. She was     wanting to know some of the symptoms and how to differentiate the symptoms     between coronavirus as well as the difference between her asthma and her     allergic rhinitis. The patient also had other questions of what she could do to     avoid rishi the coronavirus. I discussed with the patient the appropriate     hand hygiene, covering her mouth as recommended by the CDC when she leaves the     house. I have also instructed her to continue social distancing which she is     practicing. 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      Past Med History      Pulmonary nodule, asthma, rupinder      17yo, 7 daily x5 years, quit 1983      Flu-Current      Pneumonia- Current      Overview of Symptoms      Pt complains of soa, cough and wheezing, drainage, sinus pain            Plan/Instructions      Ambulatory Assessment/Plan:        Notes      New Medications      * OXYMETAZOLINE HCL (AFRIN) 15 ML SPRAY: 1 SPRAYS NARE EACH BID PRN CONGESTION       #1      * Amoxicillin/Clavulanic Acid 875/125 (Augmentin 875/125) 1 EACH TABLET: 875 MG       PO BID #20      * predniSONE 20 MG TABLET: 20 MG PO QDAY #20      * Neomycin/Bacitracin/Polymyxinb (Triple Antibiotic Ointment) 14.17 GM       OINT...G.: 1 APL TOPICAL QDAY #3      * Sodium Chloride (Ocean Nasal Solen) 104 ML SPRAY: 2 SPRAYS NARE EACH TID #3      Discontinued Medications      * TIOTROPIUM BROMIDE (Spiriva Respimat 2.5 mcg/Puff) 4 GM MIST.INHAL: 2 PUFFS       INH QDAY #4      Plan/Instructions            * Plan Of Care: ()            * Chronic conditions reviewed and taken into  consideration for today's treatment       plan.      * Patient instructed to seek medical attention urgently for new or worsening       symptoms.      * Patient was educated/instructed on their diagnosis, treatment and medications       prior to discharge from the clinic today.            PLAN:      1. At this time we will give patient ten of amoxicillin 875 one tablet twice a     day for her sinusitis.      2.  We will give prednisone 20 mg one tablet daily for the chronic sinusitis.      3. We will give patient Afrin.  I instructed patient the appropriately way to     use Afrin.      4. Encouraged social distancing for the pandemic.            Total minutes spent with the patient, I have spent from 8:20 to 8:47 for a total     of 27 minutes.      Codes:  Phone Eval 21-30 mi [id number]            Electronically signed by Hank Vee  04/20/2020 14:02       Disclaimer: Converted document may not contain table formatting or lab diagrams. Please see [employer] [facility] for the authenticated document.

## 2021-05-28 NOTE — PROGRESS NOTES
Patient: TOBY HERNANDEZ     Acct: [id number]     Report: #[id number]  UNIT #: [id number]     : 1960    Encounter Date:2020  PRIMARY CARE: SALINA PARK  ***Signed***  --------------------------------------------------------------------------------------------------------------------  Chief Complaint      Encounter Date      Mar 5, 2020            Primary Care Provider      SALINA PARK            Referring Provider      [facility]            Patient Complaint      Patient is complaining of      Patient here today for increased SOA, Asthma            VITALS      Height 5 ft 6.00 in / 167.64 cm      Weight 207 lbs  / 93.999279 kg      BSA 2.03 m2      BMI 33.4 kg/m2      Temperature 98.1 F / 36.72 C - Oral      Pulse 60      Respirations 14      Blood Pressure 134/67 Sitting, Left Arm      Pulse Oximetry 99%, room air      Initial Exhaled Nitrous Oxide      Exhaled Nitrous Oxide Results:  37            HPI      The patient is a 59 year old -American female, patient of Dr. Vee's     with a history of asthma with significant nasal polyps who is here for follow up    today. The patient states since last visit she has been having some increasing     shortness of air, coughing, nonproductive cough and some wheezing. The patient     states that symptoms are worse at times when laying down and at night.  The     patient states that she is taking Advair and Spiriva, however patient admits     that she only takes Advair one puff once a day instead of two puffs twice a day     as prescribed. The patient states that she is also taking Spiriva, however only     taking one puff instead of two puffs once daily and patient states she uses her     albuterol inhaler as needed. The patient denies any fever, chills, night sweats,    hemoptysis, purulent sputum production, chest pain, chest tightness, swollen     glands in the head and neck, abdominal pain nausea, vomiting or diarrhea.  The      patient states she is under the care of Dr. Roper for nasal polyps who has     recommended surgery, however she has not followed up with him for some time and     is going to call and follow up with him.  The patient states she has not needed     any antibiotics or steroids since last visit, however she did have a     hysterectomy seven weeks ago and was hospitalized for hysterectomy and overnight    stay.  The patient states that she also has allergy symptoms, postnasal drip and    nasal congestion at times.  The patient states that sometimes she has     palpitations and she was seen by her PCP a few days ago and had an EKG and was     told that everything was fine with her heart.  The patient states she is able to    perform all ADLs without difficulty.            I have personally reviewed the review of systems, past family, social, surgical     and medical histories and I agree with those as entered in the chart.      Copies To:   Hank Vee      Constitutional:  Denies: Fatigue, Fever, Weight gain, Weight loss, Chills,     Insomnia, Other      Respiratory/Breathing:  Complains of: Shortness of air, Wheezing, Cough, Other     (chest tightness); Denies: Hemoptysis, Pleuritic pain      Endocrine:  Denies: Polydipsia, Polyuria, Heat/cold intolerance, Abnorml     menstrual pattern, Diabetes, Other      Eyes:  Denies: Blurred vision, Vision Changes, Other      Ears, nose, mouth, throat:  Denies: Mouth lesions, Thrush, Throat pain,     Hoarseness, Allergies/Hay Fever, Post Nasal Drip, Headaches, Recent Head Injury,    Nose Bleeding, Neck Stiffness, Thyroid Mass, Hearing Loss, Ear Fullness, Dry     Mouth, Nasal or Sinus Pain, Dry Lips, Nasal discharge, Nasal congestion, Other      Cardiovascular:  Denies: Palpitations, Syncope, Claudication, Chest Pain, Wake     up Gasping for air, Leg Swelling, Irregular Heart Rate, Cyanosis, Dyspnea on     Exertion, Other      Gastrointestinal:  Denies: Nausea,  Constipation, Diarrhea, Abdominal pain,     Vomiting, Difficulty Swallowing, Reflux/Heartburn, Dysphagia, Jaundice,     Bloating, Melena, Bloody stools, Other      Genitourinary:  Denies: Urinary frequency, Incontinence, Hematuria, Urgency,     Nocturia, Dysuria, Testicular problems, Other      Musculoskeletal:  Denies: Joint Pain, Joint Stiffness, Joint Swelling, Myalgias,    Other      Hematologic/lymphatic:  DENIES: Lymphadenopathy, Bruising, Bleeding tendencies,     Other      Neurological:  Denies: Headache, Numbness, Weakness, Seizures, Other      Psychiatric:  Denies: Anxiety, Appropriate Effect, Depression, Other      Sleep:  No: Excessive daytime sleep, Morning Headache?, Snoring, Insomnia?, Stop    breathing at sleep?, Other      Integumentary:  Denies: Rash, Dry skin, Skin Warm to Touch, Other      Immunologic/Allergic:  Denies: Latex allergy, Seasonal allergies, Asthma,     Urticaria, Eczema, Other      Immunization status:  No: Up to date            FAMILY/SOCIAL/MEDICAL HX      Surgical History:  Yes: Bowel Surgery (COLONOSCOPY), Other Surgeries      Stroke - Family Hx:  Grandparent      Heart - Family Hx:  Father      Diabetes - Family Hx:  Father      Cancer/Type - Family Hx:  Sister      Other Family Medical History:  Father      Is Father Still Living?:  No      Is Mother Still Living?:  Yes      Social History:  No Tobacco Use, No Alcohol Use, No Recreational Drug use      Smoking status:  Former smoker (.5 ppd 4 years, quit 1995)      Anticoagulation Therapy:  No      Antibiotic Prophylaxis:  No      Medical History:  Yes: Allergies, Arthritis (LOWER BACK), Asthma (INHALER),     Anxiety, Hemorrhoids/Rectal Prob (WEAK BLADDER), Reflux Disease, Shortness Of     Breath, Thyroid Problem, Miscellaneous Medical/oth (sleep apnea-HYSTERECTOMY     SCHEDULED 1/2020); No: Blood Disease, Chemotherapy/Cancer, Deafness or Ringing     Ears, Sinus Trouble      Psychiatric History      anxiety             PREVENTION      Hx Influenza Vaccination:  Yes      Date Influenza Vaccine Given:  Nov 1, 2019      Influenza Vaccine Declined:  No      2 or More Falls Past Year?:  No      Fall Past Year with Injury?:  No      Hx Pneumococcal Vaccination:  Yes      Encouraged to follow-up with:  PCP regarding preventative exams.      Chart initiated by      Christine Lara CMA            ALLERGIES/MEDICATIONS      Allergies:        Coded Allergies:             CODEINE (Verified  Allergy, Intermediate, Headaches, 3/5/20)      Medications    Last Reconciled on 3/5/20 10:05 by KAITY HERNANDEZ       Omeprazole (Omeprazole*) 40 Mg Capsule      40 MG PO BID, #60 CAP 2 Refills         Reported         3/5/20       Tiotropium Bromide (Spiriva Respimat 2.5 mcg/Puff) 4 Gm Mist.inhal      2 PUFFS INH RTQDAY for 90 Days, #3 MDI 2 Refills         Prov: KAITY HERNANDEZ [facility]         3/5/20       Fluticasone/Salmeterol 230/21 (Advair /21 MCG) 12 Gm Hfa.aer.ad      2 PUFF INH RTBID, #3 INH 4 Refills         Prov: KAITY HERNANDEZ [facility]         3/5/20       Albuterol/Ipratropium (Duoneb) 3 Ml Ampul.neb      3 ML INH Q4H PRN for SHORTNESS OF BREATH, #120 NEB 5 Refills         Prov: KAITY HERNANDEZ [facility]         3/5/20       Tolterodine Tartrate (Detrol) 1 Mg Tablet      1 MG PO QDAY, TAB         Reported         1/2/20       Levothyroxine (Synthroid) 0.125 Mg      0.125 MG PO QDAY@07, #30 TAB 0 Refills         Reported         1/2/20       Gabapentin (Gabapentin) 300 Mg Capsule      300 MG PO QDAY PRN for JOINT PAIN, #60 CAP 0 Refills         Reported         1/2/20       Diclofenac Potassium (Diclofenac Potassium) 50 Mg Tablet      50 MG PO QDAY, #60 TAB 0 Refills         Reported         1/2/20       Tiotropium Bromide (Spiriva Respimat 2.5 mcg/Puff) 4 Gm Mist.inhal      2 PUFFS INH QDAY, #4 MDI 4 Refills         Prov: Hank Vee         12/11/19       Azelastine/Fluticasone (Dymista Nasal Spray) 23 Gm Spray.pump      1 SPRAYS NARE  EACH BID, #3 BOTTLE 3 Refills         Prov: Hank Vee         2/18/19       Albuterol (Proair HFA) 8.5 Gm Inh      2 PUFFS INH RTQ6H, #1 INH 0 Refills         Reported         9/24/18       Estrogen,Conj-Medroxyprogest 0.3-1.5 Mg (Prempro 0.3-1.5 MG) 1 Each Tablet      1 TAB PO QDAY, #30 TAB 0 Refills         Reported         9/24/18       Montelukast Sodium (Montelukast*) 10 Mg Tablet      10 MG PO QDAY, TAB         Reported         9/24/18       busPIRone HCl (Buspar) 15 Mg Tablet      7.5 MG PO QDAY, #30 TAB         Reported         9/24/18       Desloratadine/Pseudoephedrine 120/2.5 Mg (CLARINEX-D 12 HOUR TABLET) 1 Each     Tbmp.12hr      1 TAB PO BID for 30 Days, #60 TAB         Reported         9/24/18      Current Medications      Current Medications Reviewed 3/5/20            EXAM      GEN-patient appears stated age resting comfortable in no acute distress      Eyes-PERRL,  conjunctiva are normal in appearance extraocular muscles are     intact, no scleral icterus      Nasal-the patient has a severely enlarged turbinate bilaterally with near     complete obstruction of left nasal passage.  No polyps seen, no nasal discharge     or ulcerations.        Ears-tympanic membranes are normal no erythema no bulging, normal to inspection      Lymphatic-no swollen or enlarged cervical nodes, or axillary node, or femoral     nodes, or supraclavicular nodes      Mouth normal dentition, no erythema no ulcerations oropharynx appears normal no     exudate no evidence of postnasal drip, MP2.        Neck-there are no palpable supraclavicular or cervical adenopathy, thyroid is     normal in appearance no apparent nodules, there is no inspiratory or expiratory     stridor      Respiratory-Lungs clear to auscultation bilaterally, no wheezes, rales or rho    nchi, normal work of breathing noted, patient able to speak full sentences     without difficulty.       Cardiovascular-the heart rate is normal and regular S1 and S2  present with no     murmur or extra heart sounds, there is no JVD or pedal edema present      GI-the abdomen is normal in appearance, bowel sounds present and normal in all     quadrants no hepatosplenomegaly or masses felt      Extremities-no clubbing is present, pulses present in all extremities, capillary    refill time is normal      Musculoskeletal-Normal strength in upper and lower extremities, inspection shows    no evidence of muscle atrophy      Skin-skin is normal in appearance it is warm and dry, no rashes present, no     evidence of cyanosis, palpation reveals no masses      Neurological-the patient is alert and oriented to time place and person, moves     all 4 extremities, normal gait, normal affect and mood, CN2-12 intact      Psych-normal judgment and insight is good, normal mood and affect, alert and     oriented to person, place, time and date      Vtials      Vitals:             Height 5 ft 6.00 in / 167.64 cm           Weight 207 lbs  / 93.771301 kg           BSA 2.03 m2           BMI 33.4 kg/m2           Temperature 98.1 F / 36.72 C - Oral           Pulse 60           Respirations 14           Blood Pressure 134/67 Sitting, Left Arm           Pulse Oximetry 99%, room air            REVIEW      Results Reviewed      PCCS Results Reviewed?:  Yes Prev Lab Results, Yes Prev Radiology Results, Yes     Previous [facility] Records      Lab Results      I reviewed Dr. Vee's last office visit note.            Assessment      Palpitations - R00.2            Lung nodule - R91.1            Notes      New Medications      * Albuterol/Ipratropium (Duoneb) 3 ML AMPUL.NEB: 3 ML INH Q4H PRN SHORTNESS OF       BREATH #120         Instructions: DIAGNOSIS CODE REQUIRED PRIOR TO PRESCRIBING.      * TIOTROPIUM BROMIDE (Spiriva Respimat 2.5 mcg/Puff) 4 GM MIST.INHAL: 2 PUFFS       INH RTQDAY 90 Days #3      * Omeprazole (Omeprazole*) 40 MG CAPSULE: 40 MG PO BID #60      Renewed Medications      *  Fluticasone/Salmeterol 230/21 (Advair /21 MCG) 12 GM HFA.AER.AD: 2 PUFF      INH RTBID #3      Discontinued Medications      * PANTOPRAZOLE (Protonix) 40 MG TABLET.DR: 40 MG PO HS #90         Instructions: Take on an empty stomach.      New Diagnostics      * CBC, 1 DAY         Dx: Palpitations - R00.2      * Comp Metabolic Panel, 1 DAY         Dx: Palpitations - R00.2      * Magnesium Serum, Routine         Dx: Palpitations - R00.2      * Chest W/O Cont CT, 6 Months         Dx: Lung nodule - R91.1      New Referrals      * Cardiology, SCHEDULED PROCEDURE         Dx: Palpitations - R00.2      IMPRESSION:      1.  Pulmonary nodule.      2.  Persistent asthma.      3.  Allergic rhinitis.      4. Chronic nasal obstruction.      5. Palpitations.         6. Gastroesophageal reflux disease.        7.  Pulmonary nodule.              PLAN:      1.  The patient is noncompliant with her inhalers.  The patient reports she is     only taking Advair one puff once a day instead of two puffs twice a day. The     patient is advised to take Advair two puffs twice a day as prescribed and to     rinse her mouth out with each use. The patient also reports she is only taking     Spiriva one puff once a day instead of two puffs once a day.  The patient is     advised to take Spiriva two puffs once a day.  Written instructions on how to     take all respiratory medications given to patient in the office today. The     patient to continue albuterol inhaler as needed.      2. We will start patient on DuoNebs to use up to four times as needed and will     place an order for patient to have a new nebulizer mask and tubing.        3. The patient states she is taking Prilosec and her PCP just recently increased    it to 40 mg a day for reflux.  The patient is advised to take Prilosec as     prescribed. Patient is advised to elevate the head of the bed and to not eat 3-4    hours prior t bedtime.       4. The patient is advised to follow  up with Dr. Roper for a history of nasal     polyps as scheduled.      5. For palpitations, the patient states she had an EKG performed at her PCPs     office a few days ago.  Recommended the patient to have an echocardiogram and a     Holter monitor, however patient would prefer to see a cardiologist first. We     will refer patient to cardiology. We will also order a CBC, comp and a magnesium    level.  The patient states that her PCP also is monitoring her thyroid and she     had thyroid levels drawn a few days ago through [facility].      6.  Patient is to continue Dymista, Singulair and antihistamines for allergic     rhinitis and to follow up with ENT as scheduled.      7.  The patient is up to date on flu and pneumonia vaccines.      8.  Patient is due to have a repeat chest CT in 10/2020 for a pulmonary nodule,     advised patient if she is having problems with worsening breathing we will can     order chest CT sooner, however patient declines and states she would like to     wait until October, 2020 and she would like to try taking her medications as     prescribed first.      9.  Patient is advised to call the office, 911 or go to the ER with any new or     worsening symptoms.        10. Follow up with Dr. Vee in 3-4 weeks, sooner if needed.            Patient Education      Patient Education Provided:  Acute Bronchitis      Time Spent:  > 50% /Coord Care            Electronically signed by KAITY HERNANDEZ [facility]  03/16/2020 11:03       Disclaimer: Converted document may not contain table formatting or lab diagrams. Please see [employer] for the authenticated document.

## 2021-05-28 NOTE — PROGRESS NOTES
Patient: TOBY HERNANDEZ     Acct: [id number]     Report: #[id number]  UNIT #: [id number]     : 1960    Encounter Date:2020  PRIMARY CARE: SALINA PARK  ***Signed***  --------------------------------------------------------------------------------------------------------------------  Chief Complaint      Encounter Date      2020            Primary Care Provider      SALINA PARK            Referring Provider      [facility]            Patient Complaint      Patient is complaining of      Patient here today for F/U, Asthma            VITALS      Height 5 ft 6.00 in / 167.64 cm      Weight 210 lbs  / 95.333660 kg      BSA 2.04 m2      BMI 33.9 kg/m2      Temperature 98 F / 36.67 C - Temporal      Pulse 64      Respirations 15      Blood Pressure 140/60 Sitting, Left Arm      Pulse Oximetry 97%, room air      Initial Exhaled Nitrous Oxide      Exhaled Nitrous Oxide Results:  37            HPI      The patient is a 59 year old female, patient of Dr. Vee's who has a history     of nasal polyps and severe persistent uncontrolled asthma. The patient presents     for follow up visit today. The patient states that her breathing is at baseline.     The patient states that she will have shortness of air and wheezing that is     worse with exertion, moderate in severity and improved with rest.  The patient     states she is taking Advair and Spiriva everyday as prescribed and uses     albuterol inhaler as needed. The patient states she is also taking Singulair for    allergies as well.  The patient states she has not had to take any antibiotics     or steroids since last visit and denies any hospitalizations since last visit.      The patient denies any fever, chills, night sweats, hemoptysis, purulent sputum     production, chest pain, chest tightness, swollen glands in the head and neck,     nausea, vomiting or diarrhea.  The patient denies any headaches, myalgias,     changes in sense  of taste and/or smell or any other coronavirus or flu-like     symptoms.  The patient had an IgE level completed since last office visit that     came back elevated at a level of 44.  The patient states she was suppose to     start on Dupixent, but has not heard about the medication and is wondering when     she is going to start medication. The patient states she is also using a Dymista    nasal spray and triple antibiotic ointment for her nose and needs a refill on     medications today.  The patient states she is able to perform her ADLs without     difficulty.            I have personally reviewed the review of systems, past family, social, surgical     and medical histories and I agree with those as entered in the chart.      Copies To:   Hank Vee      Constitutional:  Denies: Fatigue, Fever, Weight gain, Weight loss, Chills,     Insomnia, Other      Respiratory/Breathing:  Complains of: Shortness of air, Wheezing; Denies: Cough,    Hemoptysis, Pleuritic pain, Other      Endocrine:  Denies: Polydipsia, Polyuria, Heat/cold intolerance, Abnorml     menstrual pattern, Diabetes, Other      Eyes:  Denies: Blurred vision, Vision Changes, Other      Ears, nose, mouth, throat:  Denies: Mouth lesions, Thrush, Throat pain,     Hoarseness, Allergies/Hay Fever, Post Nasal Drip, Headaches, Recent Head Injury,    Nose Bleeding, Neck Stiffness, Thyroid Mass, Hearing Loss, Ear Fullness, Dry     Mouth, Nasal or Sinus Pain, Dry Lips, Nasal discharge, Nasal congestion, Other      Cardiovascular:  Denies: Palpitations, Syncope, Claudication, Chest Pain, Wake     up Gasping for air, Leg Swelling, Irregular Heart Rate, Cyanosis, Dyspnea on     Exertion, Other      Gastrointestinal:  Denies: Nausea, Constipation, Diarrhea, Abdominal pain,     Vomiting, Difficulty Swallowing, Reflux/Heartburn, Dysphagia, Jaundice,     Bloating, Melena, Bloody stools, Other      Genitourinary:  Denies: Urinary frequency,  Incontinence, Hematuria, Urgency,     Nocturia, Dysuria, Testicular problems, Other      Musculoskeletal:  Denies: Joint Pain, Joint Stiffness, Joint Swelling, Myalgias,    Other      Hematologic/lymphatic:  DENIES: Lymphadenopathy, Bruising, Bleeding tendencies,     Other      Neurological:  Denies: Headache, Numbness, Weakness, Seizures, Other      Psychiatric:  Denies: Anxiety, Appropriate Effect, Depression, Other      Sleep:  No: Excessive daytime sleep, Morning Headache?, Snoring, Insomnia?, Stop    breathing at sleep?, Other      Integumentary:  Denies: Rash, Dry skin, Skin Warm to Touch, Other      Immunologic/Allergic:  Denies: Latex allergy, Seasonal allergies, Asthma,     Urticaria, Eczema, Other      Immunization status:  No: Up to date            FAMILY/SOCIAL/MEDICAL HX      Surgical History:  Yes: Bowel Surgery (COLONOSCOPY), Other Surgeries      Stroke - Family Hx:  Grandparent      Heart - Family Hx:  Father      Diabetes - Family Hx:  Father      Cancer/Type - Family Hx:  Sister      Other Family Medical History:  Father      Is Father Still Living?:  No      Is Mother Still Living?:  Yes      Social History:  No Tobacco Use, No Alcohol Use, No Recreational Drug use      Smoking status:  Former smoker (.5 ppd 4 years, quit 1995)      Anticoagulation Therapy:  No      Antibiotic Prophylaxis:  No      Medical History:  Yes: Allergies, Arthritis (LOWER BACK), Asthma (INHALER),     Anxiety, Hemorrhoids/Rectal Prob (WEAK BLADDER), Reflux Disease, Shortness Of     Breath, Thyroid Problem, Miscellaneous Medical/oth (sleep apnea-HYSTERECTOMY     SCHEDULED 1/2020); No: Blood Disease, Chemotherapy/Cancer, Deafness or Ringing     Ears, Sinus Trouble      Psychiatric History      anxiety            PREVENTION      Hx Influenza Vaccination:  Yes      Date Influenza Vaccine Given:  Nov 1, 2019      Influenza Vaccine Declined:  No      2 or More Falls in Past Year?:  No      Fall Past Year with Injury?:  No       Hx Pneumococcal Vaccination:  Yes      Encouraged to follow-up with:  PCP regarding preventative exams.      Chart initiated by      Christine Lara CMA            ALLERGIES/MEDICATIONS      Allergies:        Coded Allergies:             CODEINE (Verified  Allergy, Intermediate, Headaches, 7/8/20)      Medications    Last Reconciled on 7/8/20 10:05 by KAITY HERNANDEZ       Azelastine/Fluticasone (Dymista Nasal Spray) 23 Gm Spray.pump      1 SPRAYS NARE EACH BID, #3 BOTTLE 3 Refills         Prov: KAITY HERNANDEZ [facility]         7/8/20       Neomycin/Bacitracin/Polymyxinb (Triple Antibiotic Ointment) 14.17 Gm Oint...g.      1 APL TOPICAL QDAY, #3 TUBE         Prov: KAITY HERNANDEZ [facility]         7/8/20       Terbinafine HCl (Terbinafine) 250 Mg Tablet      250 MG PO QDAY, TAB 0 Refills         Reported         7/8/20       Metoprolol Succinate (Metoprolol Succinate) 25 Mg Tab.er.24h      25 MG PO QDAY, #30 TAB 0 Refills         Reported         7/8/20       Omeprazole (Omeprazole*) 40 Mg Capsule      40 MG PO BID, #60 CAP 2 Refills         Reported         3/5/20       Tiotropium Bromide (Spiriva Respimat 2.5 mcg/Puff) 4 Gm Mist.inhal      2 PUFFS INH RTQDAY for 90 Days, #3 MDI 2 Refills         Prov: KAITY HERNANDEZ [facility]         3/5/20       Fluticasone/Salmeterol 230/21 (Advair /21 MCG) 12 Gm Hfa.aer.ad      2 PUFF INH RTBID, #3 INH 4 Refills         Prov: KAITY HERNANDEZ [facility]         3/5/20       Albuterol/Ipratropium (Duoneb) 3 Ml Ampul.neb      3 ML INH Q4H PRN for SHORTNESS OF BREATH, #120 NEB 5 Refills         Prov: KAITY HERNANDEZ [facility]         3/5/20       Tolterodine Tartrate (Detrol) 1 Mg Tablet      1 MG PO QDAY, TAB         Reported         1/2/20       Levothyroxine (Synthroid) 0.125 Mg      0.125 MG PO QDAY@07, #30 TAB 0 Refills         Reported         1/2/20       Gabapentin (Gabapentin) 300 Mg Capsule      300 MG PO QDAY PRN for JOINT PAIN, #60 CAP 0 Refills         Reported         1/2/20        Diclofenac Potassium (Diclofenac Potassium) 50 Mg Tablet      50 MG PO QDAY, #60 TAB 0 Refills         Reported         1/2/20       Albuterol (Proair HFA) 8.5 Gm Inh      2 PUFFS INH RTQ6H, #1 INH 0 Refills         Reported         9/24/18       Estrogen,Conj-Medroxyprogest 0.3-1.5 Mg (Prempro 0.3-1.5 MG) 1 Each Tablet      1 TAB PO QDAY, #30 TAB 0 Refills         Reported         9/24/18       Montelukast Sodium (Montelukast*) 10 Mg Tablet      10 MG PO QDAY, TAB         Reported         9/24/18       Desloratadine/Pseudoephedrine 120/2.5 Mg (CLARINEX-D 12 HOUR TABLET) 1 Each     Tbmp.12hr      1 TAB PO BID for 30 Days, #60 TAB         Reported         9/24/18      Current Medications      Current Medications Reviewed 7/8/20            EXAM      GEN-patient appears stated age resting comfortable in no acute distress      Eyes-PERRL,  conjunctiva are normal in appearance extraocular muscles are     intact, no scleral icterus      Nasal-the patient has severely enlarged turbinate bilaterally, no polyps seen no    nasal discharge or ulcerations      Ears-tympanic membranes are normal no erythema no bulging, normal to inspection      Lymphatic-no swollen or enlarged cervical nodes, or axillary node, or femoral     nodes, or supraclavicular nodes      Mouth normal dentition, no erythema no ulcerations oropharynx appears normal no     exudate no evidence of postnasal drip.      Neck-there are no palpable supraclavicular or cervical adenopathy, thyroid is     normal in appearance no apparent nodules, there is no inspiratory or expiratory     stridor      Respiratory-Lungs clear to auscultation bilaterally, no wheezes, rales or     rhonchi, normal work of breathing noted, patient able to speak full sentences     without difficulty.       Cardiovascular-the heart rate is normal and regular S1 and S2 present with no     murmur or extra heart sounds, there is no JVD or pedal edema present      GI-the abdomen is normal  in appearance, bowel sounds present and normal in all     quadrants no hepatosplenomegaly or masses felt      Extremities-no clubbing is present, pulses present in all extremities, capillary    refill time is normal      Musculoskeletal-Normal strength in upper and lower extremities, inspection shows    no evidence of muscle atrophy      Skin-skin is normal in appearance it is warm and dry, no rashes present, no     evidence of cyanosis, palpation reveals no masses      Neurological-the patient is alert and oriented to time place and person, moves     all 4 extremities, normal gait, normal affect and mood, CN2-12 intact      Psych-normal judgment and insight is good, normal mood and affect, alert and     oriented to person, place, time and date      Vtials      Vitals:             Height 5 ft 6.00 in / 167.64 cm           Weight 210 lbs  / 95.486905 kg           BSA 2.04 m2           BMI 33.9 kg/m2           Temperature 98 F / 36.67 C - Temporal           Pulse 64           Respirations 15           Blood Pressure 140/60 Sitting, Left Arm           Pulse Oximetry 97%, room air            REVIEW      Results Reviewed      P[facility] Results Reviewed?:  Yes Prev Lab Results, Yes Prev Radiology Results, Yes     Previous [facility] Records      Lab Results      I reviewed Dr. Vee's last office visit notes.  I also reviewed prior lab and    imaging results.            Assessment      Severe persistent allergic asthma - J45.50            Nasal polyps - J33.9            Notes      New Medications      * Metoprolol Succinate 25 MG TAB.ER.24H: 25 MG PO QDAY #30      * Terbinafine HCl (Terbinafine) 250 MG TABLET: 250 MG PO QDAY      Renewed Medications      * Neomycin/Bacitracin/Polymyxinb (Triple Antibiotic Ointment) 14.17 GM       OINT...G.: 1 APL TOPICAL QDAY #3      * AZELASTINE/FLUTICASONE (Dymista Nasal Avenel) 23 GM SPRAY.PUMP: 1 SPRAYS NARE       EACH BID #3      Discontinued Medications      * OXYMETAZOLINE HCL (AFRIN) 15  ML SPRAY: 1 SPRAYS NARE EACH BID PRN CONGESTION       #1      * Sodium Chloride (Ocean Nasal [place]) 104 ML SPRAY: 2 SPRAYS NARE EACH TID #3      New Office Procedures      * Specialty Drug DUPIXENT, Routine         Dx: Severe persistent allergic asthma - J45.50      ASSESSMENT:       1. Severe persistent uncontrolled asthma. Patient on triple inhaler therapy.      2. Nasal polyps.      3.  Allergic rhinitis.      4. Dyspnea.      5. Elevated IgE level of 44.      6. Tobacco abuse with cigarettes in remission.            PLAN:      1.  The patient with elevated IgE level and given patient's severe persistent     uncontrolled asthma and presence of nasal polyps and optimal medical therapy, we    will write a prescription for patient to start Dupixent which will likely help     with asthma and nasal polyps.      2. The patient to continue nasal steroids.        3. The patient to continue Singulair everyday as prescribed.      4. The patient to continue Advair and Spiriva everyday and rinse mouth out after    each use of Advair.      5. The patient reports she is up-to-date with flu and pneumonia vaccines.      6.  Patient is advised to call the office, 911 or go to the ER with any new or     worsening symptoms.      7.  Follow up with Dr. Vee in 6-8 weeks, sooner if needed.            Patient Education      Patient Education Provided:  Acute Asthma      Time Spent:  > 50% /Coord Care            Electronically signed by KAITY HERNANDEZ [facility]  07/10/2020 12:35       Disclaimer: Converted document may not contain table formatting or lab diagrams. Please see [employer] for the authenticated document.

## 2021-05-28 NOTE — PROGRESS NOTES
Patient: TOBY HERNANDEZ     Acct: [id number]     Report: #[id number]  UNIT #: [id number]     : 1960    Encounter Date:2018  PRIMARY CARE: [facility]  ***Signed***  --------------------------------------------------------------------------------------------------------------------  Chief Complaint      Encounter Date      Sep 24, 2018            Primary Care Provider            Antonia Pascual            Referring Provider      [facility]            Patient Complaint      Patient is complaining of      New pt here for SOA, asthma            VITALS      Height 5 ft 6 in / 167.64 cm      Weight 207 lbs 2 oz / 93.056972 kg      BSA 2.13 m2      BMI 33.4 kg/m2      Temperature 98.5 F / 36.94 C - Oral      Pulse 64      Respirations 14      Blood Pressure 117/73 Sitting, Right Arm      Pulse Oximetry 99%, Room air      Exhaled Nitrous Oxide Testin            HPI      The patient is a very pleasant 58 year old  female with history     of reported asthma here today to be evaluated for shortness of breath.             The patient states that she started having asthma symptoms when she was 28 years    old and she was diagnosed with asthma at that time. She has been treated by her     primary care provider but no definitive tests were done to diagnosis this. The     patient was initially started on Advair but is only taking 1 puff daily. She did    not notice any significant improvement in her symptoms so stopped taking the     Advair several months ago. The patient does endorse shortness of breath and     cough. Her cough is worse at night. Sometimes she wakes up at night coughing and    has several coughing fits. She does have some associated gastrointestinal     esophageal reflux disease. She has no other aggravating or relieving factors for    her cough. She has shortness of breath occurring several times per week and with    exertional activities such as walking up a flight  of stairs or carrying objects.    The patient does not have her symptoms daily. She denies any associated chest     pains or heart palpitations. She describes it as mild in severity. She does have    some wheezing and chest discomfort. The patient has had a cardiac evaluation     that was completely normal. The patient feels that since stopping the Advair her    symptoms have gotten a little bit worse. Her symptoms of shortness of breath are    worse with exertion, when it is hot and humid and when she is around certain     fragrances.            ROS      Constitutional:  Denies: Fatigue, Fever, Weight gain, Weight loss, Chills,     Insomnia, Other      Respiratory/Breathing:  Complains of: Shortness of air, Cough; Denies: Wheezing,    Hemoptysis, Pleuritic pain, Other      Endocrine:  Denies: Polydipsia, Polyuria, Heat/cold intolerance, Abnorml menst    rual pattern, Diabetes, Other      Eyes:  Denies: Blurred vision, Vision Changes, Other      Ears, nose, mouth, throat:  Denies: Mouth lesions, Thrush, Throat pain,     Hoarseness, Allergies/Hay Fever, Post Nasal Drip, Headaches, Recent Head Injury,    Nose Bleeding, Neck Stiffness, Thyroid Mass, Hearing Loss, Ear Fullness, Dry     Mouth, Nasal or Sinus Pain, Dry Lips, Nasal discharge, Nasal congestion, Other      Cardiovascular:  Denies: Palpitations, Syncope, Claudication, Chest Pain, Wake     up Gasping for air, Leg Swelling, Irregular Heart Rate, Cyanosis, Dyspnea on     Exertion, Other      Gastrointestinal:  Denies: Nausea, Constipation, Diarrhea, Abdominal pain,     Vomiting, Difficulty Swallowing, Reflux/Heartburn, Dysphagia, Jaundice,     Bloating, Melena, Bloody stools, Other      Genitourinary:  Denies: Urinary frequency, Incontinence, Hematuria, Urgency,     Nocturia, Dysuria, Testicular problems, Other      Musculoskeletal:  Denies: Joint Pain, Joint Stiffness, Joint Swelling, Myalgias,    Other      Hematologic/lymphatic:  DENIES: Lymphadenopathy,  Bruising, Bleeding tendencies,     Other      Neurological:  Denies: Headache, Numbness, Weakness, Seizures, Other      Psychiatric:  Denies: Anxiety, Appropriate Effect, Depression, Other      Sleep:  No: Excessive daytime sleep, Morning Headache?, Snoring, Insomnia?, Stop    breathing at sleep?, Other      Integumentary:  Denies: Rash, Dry skin, Skin Warm to Touch, Other      Immunologic/Allergic:  Denies: Latex allergy, Seasonal allergies, Asthma,     Urticaria, Eczema, Other      Immunization status:  No: Up to date            FAMILY/SOCIAL/MEDICAL HX      Surgical History:  Yes: Other Surgeries      Stroke - Family Hx:  Grandparent      Heart - Family Hx:  Father      Diabetes - Family Hx:  Father      Cancer/Type - Family Hx:  Sister      Other Family Medical History:  Father      Is Father Still Living?:  No      Is Mother Still Living?:  Yes      Smoking status:  Former smoker (.5 ppd 4 years, quit 1995)      Anticoagulation Therapy:  No      Antibiotic Prophylaxis:  No      Medical History:  Yes: Allergies, Asthma, Anxiety, Thyroid Problem,     Miscellaneous Medical/oth (sleep apnea)      Psychiatric History      Anxiety            PREVENTION      Hx Influenza Vaccination:  Yes      Date Influenza Vaccine Given:  Sep 1, 2017      Influenza Vaccine Declined:  No      2 or More Falls Past Year?:  No      Fall Past Year with Injury?:  No      Hx Pneumococcal Vaccination:  Yes      Encouraged to follow-up with:  PCP regarding preventative exams.      Chart initiated by      Barbara Purcell MA            ALLERGIES/MEDICATIONS      Medications    Last Reconciled on 9/24/18 10:04 by RACHEL LEACH MD      NEB-Albuterol Sulf (Albuterol Sulfate) 5 Mg/1 Ml Solution      2.5 MG INH Q6H PRN for SHORTNESS OF BREATH, #3 BOTTLE 0 Refills         Reported         9/24/18       Albuterol (Proair HFA*) 8.5 Gm Inh      2 PUFFS INH RTQ6H, #1 INH 0 Refills         Reported         9/24/18       CPAP Compressor (CPAP) 1 Each Each       EACH XX ONCE, #1 0 Refills         Reported         9/24/18       Mupirocin (Bactroban 2% Oint) 22 Gm Oint...g.      1 APL TOPICAL BID, #1 TUBE         Reported         9/24/18       Nebulizer/Compressor (Nebulizer) 1 Each Each      EACH XX ONCE, #1 0 Refills         Reported         9/24/18       MDI-Advair 500/50 (Advair 500/50 Diskus) 1 Each Blst.w.dev      1 PUFF INH RTBID, #1 INH         Reported         9/24/18       Estrogen,Con/M-Progest Acet (Prempro 0.3 MG/1.5 MG) 1 Each Tablet      1 TAB PO QDAY, #30 TAB 0 Refills         Reported         9/24/18       Montelukast Sodium (Montelukast*) 10 Mg Tablet      10 MG PO QDAY, TAB         Reported         9/24/18       Levothyroxine (Synthroid) Unknown Strength Mg      PO QDAY, #30 TAB 0 Refills         Reported         9/24/18       Buspirone Hcl (Buspar) 15 Mg Tablet      7.5 MG PO QDAY, #30 TAB         Reported         9/24/18       Desloratadine/Pseudoephedrine 120/2.5 Mg (Clarinex-D 12 Hour *) 1 Each Tbmp.12hr      1 TAB PO BID for 30 Days, #60 TAB         Reported         9/24/18       Aspirin (Aspirin Baby *) 81 Mg Tab.chew      81 MG PO QDAY, #30 TAB.CHEW 0 Refills         Reported         9/24/18       Folic Acid/Vit Bcomp,C (Super B-Complex Folic-Vit C Tb) 400 Mcg Tablet      1 TAB PO QDAY, TAB         Reported         9/24/18       Meclizine Hcl (Meclizine*) 25 Mg Tablet      25 MG PO TID, #90 TAB 0 Refills         Reported         9/24/18       Anthony-Fluticasone (Fluticasone 50 mcg) 16 Gm Spray.susp      1 PUFFS NARE EACH BID, #1 BOTTLE 0 Refills         Reported         9/16/18      Current Medications      Current Medications Reviewed 9/24/18            EXAM      GEN-patient appears stated age resting comfortable in no acute distress      Eyes-PERRL,  conjunctiva are normal in appearance extraocular muscles are     intact, no scleral icterus      Nasal-both nares are patent turbinates appear to be severely swollen     bilaterally,  no polyps seen  no nasal discharge or ulcerations      Ears-tympanic membranes are normal no erythema no bulging, normal to inspection      Lymphatic-no swollen or enlarged cervical nodes, or axillary node, or femoral     nodes, or supraclavicular nodes      Mouth normal dentition, no erythema no ulcerations oropharynx appears normal no     exudate no evidence of postnasal drip, MP II      Neck-there are no palpable supraclavicular or cervical adenopathy, thyroid is     normal in appearance no apparent nodules, there is no inspiratory or expiratory     stridor      Respiratory-patient exhibits normal work of breathing, speaking in full     sentences without difficulty, the chest is normal in appearance, clear to     auscultation with no wheezes rales or rhonchi, chest is normal to percussion on     both the right and left sides      Cardiovascular-the heart rate is normal and regular S1 and S2 present with no     murmur or extra heart sounds, there is no JVD or pedal edema present      GI-the abdomen is normal in appearance, bowel sounds present and normal in all     quadrants no hepatosplenomegaly or masses felt      Extremities-no clubbing is present, pulses present in all extremities, capillary    refill time is normal      Musculoskeletal-Normal strength in upper and lower extremities, inspection shows    no evidence of muscle atrophy      Skin-skin is normal in appearance it is warm and dry, no rashes present, no     evidence of cyanosis, palpation reveals no masses      Neurological-the patient is alert and oriented to time place and person, moves     all 4 extremities, normal gait, normal affect and mood, CN2-12 intact      Psych-normal judgment and insight is good, normal mood and affect, alert and     oriented to person, place, and time, and date      Vtials      Vitals:             Height 5 ft 6 in / 167.64 cm           Weight 207 lbs 2 oz / 93.329919 kg           BSA 2.13 m2           BMI 33.4 kg/m2           Temperature  98.5 F / 36.94 C - Oral           Pulse 64           Respirations 14           Blood Pressure 117/73 Sitting, Right Arm           Pulse Oximetry 99%, Room air            REVIEW      Results Reviewed      [facility] Results Reviewed?:  Yes Prev Lab Results, Yes Prev Radiology Results, Yes     Previous [facility] Records            Assessment      SOB (shortness of breath) - R06.02            Chronic cough - R05            Notes      New Medications      * Meclizine Hcl (Meclizine*) 25 MG TABLET: 25 MG PO TID #90      * Folic Acid/Vit Bcomp,C (Super B-Complex Folic-Vit C Tb) 400 MCG TABLET: 1 TAB       PO QDAY      * Aspirin (Aspirin Baby *) 81 MG TAB.CHEW: 81 MG PO QDAY #30      * DESLORATADINE/PSEUDOEPHEDRINE 120/2.5 MG (Clarinex-D 12 Hour *) 1 EACH       TBMP.12HR: 1 TAB PO BID 30 Days #60      * Buspirone Hcl (Buspar) 15 MG TABLET: 7.5 MG PO QDAY #30      * Levothyroxine (Synthroid) Unknown Strength MG: PO QDAY #30      * MONTELUKAST SODIUM (Montelukast*) 10 MG TABLET: 10 MG PO QDAY      * ESTROGEN,CON/M-PROGEST ACET (Prempro 0.3 MG/1.5 MG) 1 EACH TABLET: 1 TAB PO       QDAY #30      * MDI-Advair 500/50 (Advair 500/50 Diskus) 1 EACH BLST.W.DEV: 1 PUFF INH RTBID       #1      * NEBULIZER/COMPRESSOR (Nebulizer) 1 EACH EACH: EACH XX ONCE #1         Instructions: Use as directed to administer nebulized medications.      * MUPIROCIN (Bactroban 2% Oint) 22 GM OINT...G.: 1 APL TOPICAL BID #1      * CPAP Compressor (CPAP) 1 EACH EACH: EACH XX ONCE #1      * ALBUTEROL (Proair HFA*) 8.5 GM INH: 2 PUFFS INH RTQ6H #1      * NEB-Albuterol Sulf (Albuterol Sulfate) 5 MG/1 ML SOLUTION: 2.5 MG INH Q6H PRN       SHORTNESS OF BREATH #3         Instructions: DIAGNOSIS CODE REQUIRED PRIOR TO PRESCRIBING.      Discontinued Medications      * Acyclovir 800 MG TABLET: 800 MG PO 5XD #50      * Lidocaine (Topicaine 5%) 113 GM GEL..GRAM.: 1 APL TOPICAL TID 7 Days #1      New Diagnostics      * PFT-Comp, PrePost,DLCO,BodyBox, Week         Dx: SOB  (shortness of breath) - R06.02      * Immunoglobulin  E (I, Week         Dx: SOB (shortness of breath) - R06.02      * CBC, Month         Dx: SOB (shortness of breath) - R06.02      * Chest W/O Cont High Resolution, SCHEDULED PROCEDURE         Dx: Chronic cough - R05      New Office Procedures      * Flu Vaccine Quadrivalent, As Soon As Possible         FLU VACC [id number] 36MOS UP/PF (Fluzone Quadrivalent [id number] Syringe) 60        MCG/0.5 ML SYRINGE: 60 MICROGRAM INTRAMUSCULARLY Qty 1 SYRINGE      ASSESSMENT:      1. Moderate persistent asthma.       2. Allergic rhinitis.      3. Obstructive sleep apnea.      4. Dyspnea on exertion.       5. Chronic cough lasting greater than 8 weeks.             PLAN:      1. At this time we will obtain CT scan of the chest with high resolution imaging    to rule out interstitial lung disease or any other inflammatory process given     her cough. I feel that the patient's cough is most likely related to her asthma     and is most likely a cough variant asthma.       2. We will start the patient on Advair HFA to be taken two puffs twice daily.       3. FENO was obtained in the office today which was abnormal suggesting that the     patient does indeed have asthma.      4. Continue Singulair 10 mg PO q HS.       5. Continue rescue inhaler.       6. Obtain IgE level and CBC to assess for eosinophilia. We will also obtain full    pulmonary function tests.      7. For the patient's gastrointestinal esophageal reflux disease I have     instructed her to take her Prevacid on a daily basis until she sees me next time    to see if this helps with her nocturnal cough.       8. We will obtain alpha-1 antitrypsin testing today.       9. We will give the patient influenza vaccine today in the office. She will need    pneumonia vaccine in the future.       10. I have personally reviewed laboratory data, imaging as well as previous     medical records.            Patient Education       Education resources provided:  Yes      Patient Education Provided:  Acute Asthma                 Disclaimer: Converted document may not contain table formatting or lab diagrams. Please see [employer] for the authenticated document.

## 2021-05-28 NOTE — PROGRESS NOTES
Patient: TOBY HERNANDEZ     Acct: [id number]     Report: #[id number]  UNIT #: [id number]     : 1960    Encounter Date:10/26/2018  PRIMARY CARE: [facility]  ***Signed***  --------------------------------------------------------------------------------------------------------------------  Chief Complaint      Encounter Date      Oct 26, 2018            Primary Care Provider            Antonia Pascual            Referring Provider      [facility]            Patient Complaint      Patient is complaining of      Pt here for 6w f/u, cough            VITALS      Height 5 ft 6 in / 167.64 cm      Weight 206 lbs 3 oz / 93.949794 kg      BSA 2.03 m2      BMI 33.3 kg/m2      Temperature 97.8 F / 36.56 C - Oral      Pulse 80      Respirations 14      Blood Pressure 133/62 Sitting, Right Arm      Pulse Oximetry 98%, Room air      Exhaled Nitrous Oxide Testin            HPI      The patient is a very pleasant 58 year old  female here today     for follow up.             She had CT scan of the chest since her last office visit that showed several     pulmonary nodules. The patient had pulmonary function studies that were     unremarkable with no obstruction. She had IgE level and CBC that showed on     eosinophilia. Her cough is significantly improved with Advair HFA and her     breathing is also improved as well. She does have some scattered cough that is     worse at night. It is associated with her acid reflux. The patient is not taking    her proton pump inhibitor as prescribed and feels that her symptoms are improved    when she takes it. She describes her cough as mild in severity, dry and     nonproductive with no identifiable aggravating factors other than reflux.     Relieving factors are inhalers and antireflux medications.            ROS      Constitutional:  Denies: Fatigue, Fever, Weight gain, Weight loss, Chills,     Insomnia, Other      Respiratory/Breathing:  Complains of:  Shortness of air, Wheezing, Cough; Denies:    Hemoptysis, Pleuritic pain, Other      Endocrine:  Denies: Polydipsia, Polyuria, Heat/cold intolerance, Abnorml     menstrual pattern, Diabetes, Other      Eyes:  Denies: Blurred vision, Vision Changes, Other      Ears, nose, mouth, throat:  Denies: Mouth lesions, Thrush, Throat pain,     Hoarseness, Allergies/Hay Fever, Post Nasal Drip, Headaches, Recent Head Injury,    Nose Bleeding, Neck Stiffness, Thyroid Mass, Hearing Loss, Ear Fullness, Dry     Mouth, Nasal or Sinus Pain, Dry Lips, Nasal discharge, Nasal congestion, Other      Cardiovascular:  Denies: Palpitations, Syncope, Claudication, Chest Pain, Wake     up Gasping for air, Leg Swelling, Irregular Heart Rate, Cyanosis, Dyspnea on     Exertion, Other      Gastrointestinal:  Denies: Nausea, Constipation, Diarrhea, Abdominal pain,     Vomiting, Difficulty Swallowing, Reflux/Heartburn, Dysphagia, Jaundice, Bloating    , Melena, Bloody stools, Other      Genitourinary:  Denies: Urinary frequency, Incontinence, Hematuria, Urgency,     Nocturia, Dysuria, Testicular problems, Other      Musculoskeletal:  Denies: Joint Pain, Joint Stiffness, Joint Swelling, Myalgias,    Other      Hematologic/lymphatic:  DENIES: Lymphadenopathy, Bruising, Bleeding tendencies,     Other      Neurological:  Denies: Headache, Numbness, Weakness, Seizures, Other      Sleep:  No: Excessive daytime sleep, Morning Headache?, Snoring, Insomnia?, Stop    breathing at sleep?, Other      Integumentary:  Denies: Rash, Dry skin, Skin Warm to Touch, Other      Immunologic/Allergic:  Denies: Latex allergy, Seasonal allergies, Asthma,     Urticaria, Eczema, Other      Immunization status:  No: Up to date            FAMILY/SOCIAL/MEDICAL HX      Surgical History:  Yes: Other Surgeries      Stroke - Family Hx:  Grandparent      Heart - Family Hx:  Father      Diabetes - Family Hx:  Father      Cancer/Type - Family Hx:  Sister      Other Family Medical  History:  Father      Is Father Still Living?:  No      Is Mother Still Living?:  Yes      Smoking status:  Former smoker (.5 ppd 4 years, quit 1995)      Anticoagulation Therapy:  No      Antibiotic Prophylaxis:  No      Medical History:  Yes: Allergies, Asthma, Anxiety, Reflux Disease, Thyroid     Problem, Miscellaneous Medical/oth (sleep apnea); No: Sinus Trouble      Psychiatric History      Anxiety            PREVENTION      Hx Influenza Vaccination:  Yes      Date Influenza Vaccine Given:  Sep 1, 2018      Influenza Vaccine Declined:  No      2 or More Falls Past Year?:  No      Fall Past Year with Injury?:  No      Hx Pneumococcal Vaccination:  Yes      Encouraged to follow-up with:  PCP regarding preventative exams.      Chart initiated by      Barbara Purcell MA            ALLERGIES/MEDICATIONS      Allergies:        Coded Allergies:             Codeine (Verified  Allergy, Intermediate, Headaches, 10/26/18)      Medications    Last Reconciled on 9/24/18 10:04 by RACHEL LEACH MD      NEB-Albuterol Sulf (Albuterol Sulfate) 5 Mg/1 Ml Solution      2.5 MG INH Q6H PRN for SHORTNESS OF BREATH, #3 BOTTLE 0 Refills         Reported         9/24/18       Albuterol (Proair HFA*) 8.5 Gm Inh      2 PUFFS INH RTQ6H, #1 INH 0 Refills         Reported         9/24/18       CPAP Compressor (CPAP) 1 Each Each      EACH XX ONCE, #1 0 Refills         Reported         9/24/18       Mupirocin (Bactroban 2% Oint) 22 Gm Oint...g.      1 APL TOPICAL BID, #1 TUBE         Reported         9/24/18       Nebulizer/Compressor (Nebulizer) 1 Each Each      EACH XX ONCE, #1 0 Refills         Reported         9/24/18       MDI-Advair 500/50 (Advair 500/50 Diskus) 1 Each Blst.w.dev      1 PUFF INH RTBID, #1 INH         Reported         9/24/18       Estrogen,Con/M-Progest Acet (Prempro 0.3 MG/1.5 MG) 1 Each Tablet      1 TAB PO QDAY, #30 TAB 0 Refills         Reported         9/24/18       Montelukast Sodium (Montelukast*) 10 Mg Tablet       10 MG PO QDAY, TAB         Reported         9/24/18       Levothyroxine (Synthroid) Unknown Strength Mg      PO QDAY, #30 TAB 0 Refills         Reported         9/24/18       Buspirone Hcl (Buspar) 15 Mg Tablet      7.5 MG PO QDAY, #30 TAB         Reported         9/24/18       Desloratadine/Pseudoephedrine 120/2.5 Mg (CLARINEX-D 12 HOUR TABLET) 1 Each     Tbmp.12hr      1 TAB PO BID for 30 Days, #60 TAB         Reported         9/24/18       Aspirin Chew (Aspirin Baby) 81 Mg Tab.chew      81 MG PO QDAY, #30 TAB.CHEW 0 Refills         Reported         9/24/18       Folic Acid/Vit Bcomp,C (Super B-Complex Folic-Vit C Tb) 400 Mcg Tablet      1 TAB PO QDAY, TAB         Reported         9/24/18       Meclizine Hcl (Meclizine*) 25 Mg Tablet      25 MG PO TID, #90 TAB 0 Refills         Reported         9/24/18       Anthony-Fluticasone (Fluticasone 50 mcg) 16 Gm Spray.susp      1 PUFFS NARE EACH BID, #1 BOTTLE 0 Refills         Reported         9/16/18      Current Medications      Current Medications Reviewed 10/26/18            EXAM      GEN-patient appears stated age resting comfortable in no acute distress      Eyes-PERRL,  conjunctiva are normal in appearance extraocular muscles are     intact, no scleral icterus      Nasal-both nares are patent turbinates appear to be severely swollen     bilaterally,  no polyps seen no nasal discharge or ulcerations      Lymphatic-no swollen or enlarged cervical nodes, or axillary node, or femoral     nodes, or supraclavicular nodes      Mouth normal dentition, no erythema no ulcerations oropharynx appears normal no     exudate no evidence of postnasal drip, MP II      Neck-there are no palpable supraclavicular or cervical adenopathy, thyroid is     normal in appearance no apparent nodules, there is no inspiratory or expiratory     stridor      Respiratory-patient exhibits normal work of breathing, speaking in full     sentences without difficulty, the chest is normal in appearance,  clear to     auscultation with no wheezes rales or rhonchi, chest is normal to percussion on     both the right and left sides      Cardiovascular-the heart rate is normal and regular S1 and S2 present with no     murmur or extra heart sounds, there is no JVD or pedal edema present      GI-the abdomen is normal in appearance, bowel sounds present and normal in all     quadrants no hepatosplenomegaly or masses felt      Extremities-no clubbing is present, pulses present in all extremities, capillary    refill time is normal      Skin-skin is normal in appearance it is warm and dry, no rashes present, no     evidence of cyanosis, palpation reveals no masses      Neurological-the patient is alert and oriented to time place and person, moves     all 4 extremities, normal gait, normal affect and mood, CN2-12 intact      Psych-normal judgment and insight is good, normal mood and affect, alert and     oriented to person, place, and time, and date      Vtials      Vitals:             Height 5 ft 6 in / 167.64 cm           Weight 206 lbs 3 oz / 93.015331 kg           BSA 2.03 m2           BMI 33.3 kg/m2           Temperature 97.8 F / 36.56 C - Oral           Pulse 80           Respirations 14           Blood Pressure 133/62 Sitting, Right Arm           Pulse Oximetry 98%, Room air            REVIEW      Results Reviewed      [facility] Results Reviewed?:  Yes Prev Lab Results, Yes Prev Radiology Results, Yes     Previous [facility] Records            Assessment      Pulmonary nodules - R91.8            Notes      New Medications      * Pantoprazole (Protonix*) 40 MG TABLET.DR: 40 MG PO HS #90         Instructions: Take on an empty stomach.      New Diagnostics      * Chest W/O Cont CT, 4 Months         Dx: Pulmonary nodules - R91.8      * Anticytoplas. Neutro ANCA, Routine         Dx: Pulmonary nodules - R91.8      ASSESSMENT:      1. Moderate persistent asthma.       2. Cough variant asthma.       3. Obstructive sleep apnea.      4.  Allergic rhinitis.       5. Pulmonary nodules.             PLAN:      1. Repeat CT scan of the chest in 4 months for pulmonary nodules.       2. Obtain ANAC to rule out Wegener's disease as a cause of her nodules.       3. For the patient's asthma we will continue on Advair and Singulair as well as     rescue inhaler.       4. For the patient's gastrointestinal esophageal reflux disease we will start     the patient on Protonix 40 mg PO q HS.  I instructed the patient to take his     religiously as I feel that her chronic cough part of the problem is because she     had significant uncontrolled acid reflux.       5. The patient is up to date on flu vaccine.      6. I have personally reviewed laboratory data, imaging as well as previous     medical records.                 Disclaimer: Converted document may not contain table formatting or lab diagrams. Please see [facility] for the authenticated document.

## 2021-05-28 NOTE — PROGRESS NOTES
Patient: TOBY HERNANDEZ     Acct: [id number]     Report: #[id number]  UNIT #: [id number]     : 1960    Encounter Date:10/14/2020  PRIMARY CARE: SALINA PARK  ***Signed***  --------------------------------------------------------------------------------------------------------------------  Chief Complaint      Encounter Date      Oct 14, 2020            Primary Care Provider      SALINA PARK            Patient Complaint      Patient is complaining of      PT here today for F/U, Asthma            VITALS      Height 5 ft 6 in / 167.64 cm      Weight 213 lbs  / 96.077905 kg      BSA 2.05 m2      BMI 34.4 kg/m2      Temperature 98.5 F / 36.94 C - Temporal      Pulse 58      Respirations 15      Blood Pressure 141/68 Sitting, Left Arm      Pulse Oximetry 98%, room air            HPI      The patient is a 60 year old female, patient of Dr. Vee who has a history of    nasal polyps and severe persistent uncontrolled asthma.  The patient presents     for follow up visit today. The patient states that since last visit her     breathing is at baseline.  The patient states that she will get short of breath     that is worse with exertion, moderate in severity and improved with rest.  The     patient states that she did have some worsening shortness of breath last week,     however it is improved this week.  The patient states that she does have allergy    symptoms that are worse in the fall and the spring. The patient states that she     has seen an allergist in the past and would like to have referral to go back to     see an allergist and possibly start allergy shots. The patient currently denies     any wheezing, fever, chills, night sweats, hemoptysis, purulent sputum     production, swollen glands in the head and neck,  chest pain, chest tightness,     abdominal pain, nausea, vomiting, diarrhea.  The patient denies any headaches,     myalgias, sore throat, changes in sense of taste and/or smell or any  other     coronavirus or flu-like symptoms.  The patient states she is taking Advair and     Spiriva everyday as prescribed and has started Dupixent and has already had     three injections.  The patient states she is also taking Singulair for allergies    along with Dymista nasal spray.  The patient states she is able to perform ADLs     without difficulty.  The patient states she has not had to take any antibiotics     or steroids since last office visit and denies any hospitalizations since last     visit. The patient did have repeat chest CT scan completed on 10/06/2020 and it     showed stable pulmonary nodules measuring up to 5 mm within the right middle     lobe dating back to 10/08/2018, given stability for two years these are likely a    benign etiology.              I have personally reviewed the review of systems, past family, social, surgical     and medical histories and I agree with those as entered in the chart.      Copies To:   Hank Vee      Constitutional:  Denies: Fatigue, Fever, Weight gain, Weight loss, Chills,     Insomnia, Other      Respiratory/Breathing:  Complains of: Shortness of air, Wheezing, Cough; Denies:    Hemoptysis, Pleuritic pain, Other      Endocrine:  Denies: Polydipsia, Polyuria, Heat/cold intolerance, Abnorml     menstrual pattern, Diabetes, Other      Eyes:  Denies: Blurred vision, Vision Changes, Other      Ears, nose, mouth, throat:  Denies: Mouth lesions, Thrush, Throat pain,     Hoarseness, Allergies/Hay Fever, Post Nasal Drip, Headaches, Recent Head Injury,    Nose Bleeding, Neck Stiffness, Thyroid Mass, Hearing Loss, Ear Fullness, Dry     Mouth, Nasal or Sinus Pain, Dry Lips, Nasal discharge, Nasal congestion, Other      Cardiovascular:  Denies: Palpitations, Syncope, Claudication, Chest Pain, Wake     up Gasping for air, Leg Swelling, Irregular Heart Rate, Cyanosis, Dyspnea on     Exertion, Other      Gastrointestinal:  Denies: Nausea,  Constipation, Diarrhea, Abdominal pain,     Vomiting, Difficulty Swallowing, Reflux/Heartburn, Dysphagia, Jaundice,     Bloating, Melena, Bloody stools, Other      Genitourinary:  Denies: Urinary frequency, Incontinence, Hematuria, Urgency,     Nocturia, Dysuria, Testicular problems, Other      Musculoskeletal:  Denies: Joint Pain, Joint Stiffness, Joint Swelling, Myalgias,    Other      Hematologic/lymphatic:  DENIES: Lymphadenopathy, Bruising, Bleeding tendencies,     Other      Neurological:  Denies: Headache, Numbness, Weakness, Seizures, Other      Psychiatric:  Denies: Anxiety, Appropriate Effect, Depression, Other      Sleep:  No: Excessive daytime sleep, Morning Headache?, Snoring, Insomnia?, Stop    breathing at sleep?, Other      Integumentary:  Denies: Rash, Dry skin, Skin Warm to Touch, Other      Immunologic/Allergic:  Denies: Latex allergy, Seasonal allergies, Asthma,     Urticaria, Eczema, Other      Immunization status:  No: Up to date            FAMILY/SOCIAL/MEDICAL HX      Surgical History:  Yes: Bowel Surgery (COLONOSCOPY), Other Surgeries      Stroke - Family Hx:  Grandparent      Heart - Family Hx:  Father      Diabetes - Family Hx:  Father      Cancer/Type - Family Hx:  Sister      Other Family Medical History:  Father      Smoking status:  Former smoker ((.5 ppd 4 years, quit 1995))      Anticoagulation Therapy:  No      Antibiotic Prophylaxis:  No      Medical History:  Yes: Arthritis (LOWER BACK), Asthma (INHALER), Anxiety,     Hemorrhoids/Rectal Prob (WEAK BLADDER), Reflux Disease, Shortness Of Breath,     Thyroid Problem, Miscellaneous Medical/oth (sleep apnea-HYSTERECTOMY SCHEDULED     1/2020); No: Blood Disease, Chemotherapy/Cancer, Deafness or Ringing Ears, Sinus    Trouble      Psychiatric History      anxiety            PREVENTION      Hx Influenza Vaccination:  Yes      Date Influenza Vaccine Given:  Oct 1, 2020      Influenza Vaccine Declined:  No      2 or More Falls in Past  Year?:  No      Fall Past Year with Injury?:  No      Hx Pneumococcal Vaccination:  Yes      Encouraged to follow-up with:  PCP regarding preventative exams.      Chart initiated by      Christine Lara CMA            ALLERGIES/MEDICATIONS      Allergies:        Coded Allergies:             CODEINE (Verified  Allergy, Intermediate, Headaches, 7/8/20)      Medications    Last Reconciled on 10/14/20 11:26 by KAITY HERNANDEZ,       Alcohol Antiseptic Pads (Alcohol Swabs) 1 Each Med..pad      1 EACH TOPICAL QDAY, #200 EACH 2 Refills         [facility]         10/14/20       Dupilumab (Dupixent) 200 Mg/1.14 Ml Syringe      200 MG SUBQ V3IFVIM, #2 SYRINGE 11 Refills         Prov: [facility]         7/22/20       Azelastine/Fluticasone (Dymista Nasal Spray) 23 Gm Spray.pump      1 SPRAYS NARE EACH BID, #3 BOTTLE 3 Refills         Prov[facility]         7/8/20       Terbinafine HCl (Terbinafine) 250 Mg Tablet      250 MG PO QDAY, TAB 0 Refills         Reported         7/8/20       Metoprolol Succinate (Metoprolol Succinate) 25 Mg Tab.er.24h      25 MG PO QDAY, #30 TAB 0 Refills         Reported         7/8/20       Omeprazole (Omeprazole*) 40 Mg Capsule      40 MG PO BID, #60 CAP 2 Refills         Reported         3/5/20       Tiotropium Bromide (Spiriva Respimat 2.5 mcg/Puff) 4 Gm Mist.inhal      2 PUFFS INH RTQDAY for 90 Days, #3 MDI 2 Refills         Prov: KAITY HERNANDEZ [facility]         3/5/20       Fluticasone/Salmeterol 230/21 (Advair /21 MCG) 12 Gm Hfa.aer.ad      2 PUFF INH RTBID, #3 INH 4 Refills         Prov: [facility]         3/5/20       Albuterol/Ipratropium (Duoneb) 3 Ml Ampul.neb      3 ML INH Q4H PRN for SHORTNESS OF BREATH, #120 NEB 5 Refills         Prov: [facility]         3/5/20       Tolterodine Tartrate (Detrol) 1 Mg Tablet      1 MG PO QDAY, TAB         Reported         1/2/20       Levothyroxine (Synthroid) 0.125 Mg      0.125 MG  PO QDAY@07, #30 TAB 0 Refills         Reported         1/2/20       Gabapentin (Gabapentin) 300 Mg Capsule      300 MG PO QDAY PRN for JOINT PAIN, #60 CAP 0 Refills         Reported         1/2/20       Diclofenac Potassium (Diclofenac Potassium) 50 Mg Tablet      50 MG PO QDAY, #60 TAB 0 Refills         Reported         1/2/20       Albuterol (Proair HFA) 8.5 Gm Inh      2 PUFFS INH RTQ6H, #1 INH 0 Refills         Reported         9/24/18       Estrogen,Conj-Medroxyprogest 0.3-1.5 Mg (Prempro 0.3-1.5 MG) 1 Each Tablet      1 TAB PO QDAY, #30 TAB 0 Refills         Reported         9/24/18       Montelukast Sodium (Montelukast*) 10 Mg Tablet      10 MG PO QDAY, TAB         Reported         9/24/18       Desloratadine/Pseudoephedrine 120/2.5 Mg (CLARINEX-D 12 HOUR TABLET) 1 Each     Tbmp.12hr      1 TAB PO BID for 30 Days, #60 TAB         Reported         9/24/18      Current Medications      Current Medications Reviewed 10/14/20            EXAM      GEN-patient appears stated age resting comfortable in no acute distress      Eyes-PERRL,  conjunctiva are normal in appearance extraocular muscles are     intact, no scleral icterus      Nasal-both nares are patent turbinates appear normal no polyps seen no nasal     discharge or ulcerations      Ears-tympanic membranes are normal no erythema no bulging, normal to inspection      Lymphatic-no swollen or enlarged cervical nodes, or axillary node, or femoral     nodes, or supraclavicular nodes      Mouth normal dentition, no erythema no ulcerations oropharynx appears normal no     exudate no evidence of postnasal drip, MP(default value)      Neck-there are no palpable supraclavicular or cervical adenopathy, thyroid is     normal in appearance no apparent nodules, there is no inspiratory or expiratory     stridor      Respiratory-Lungs clear to auscultation bilaterally, no wheezes, rales or rh    onchi, normal work of breathing noted, patient able to speak full sentences      without difficulty.       Cardiovascular-the heart rate is normal and regular S1 and S2 present with no     murmur or extra heart sounds, there is no JVD or pedal edema present      GI-the abdomen is normal in appearance, bowel sounds present and normal in all     quadrants no hepatosplenomegaly or masses felt      Extremities-no clubbing is present, pulses present in all extremities, capillary    refill time is normal      Musculoskeletal-Normal strength in upper and lower extremities, inspection shows    no evidence of muscle atrophy      Skin-skin is normal in appearance it is warm and dry, no rashes present, no     evidence of cyanosis, palpation reveals no masses      Neurological-the patient is alert and oriented to time place and person, moves     all 4 extremities, normal gait, normal affect and mood, CN2-12 intact      Psych-normal judgment and insight is good, normal mood and affect, alert and     oriented to person, place, time and date      Vtials      Vitals:             Height 5 ft 6 in / 167.64 cm           Weight 213 lbs  / 96.228501 kg           BSA 2.05 m2           BMI 34.4 kg/m2           Temperature 98.5 F / 36.94 C - Temporal           Pulse 58           Respirations 15           Blood Pressure 141/68 Sitting, Left Arm           Pulse Oximetry 98%, room air            REVIEW      Results Reviewed      P[facility]S Results Reviewed?:  Yes Prev Lab Results, Yes Prev Radiology Results, Yes     Previous Mecial Records      Lab Results      I reviewed my last office visit note.      Radiographic Results               [facility]                PACS RADIOLOGY REPORT            Patient: TOBY HERNANDEZ   Acct: #[id number]   Report: #[id number]            UNIT #: [id number]    DOS: 10/06/20 0745      INSURANCE:[facility]   ORDER #:[id number]      LOCATION:[place]     : 1960            PROVIDERS      ADMITTING:      ATTENDING: KAITY HERNANDEZ [facility]      FAMILY:  SALINA PARK   [facility]         OTHER:    DICTATING:  RUEL AKINS MD            REQ #:[id number]   EXAM:CHWO - CT CHEST without CONTRAST      REASON FOR EXAM:        REASON FOR VISIT:  SOL PULM NODULE            *******Signed******         PROCEDURE:   CT CHEST WITHOUT CONTRAST             COMPARISON:   [facility], CT, CHEST W/O CONTRAST, 10/08/2018,     8:22.  [facility], CT, CHEST W/O CONTRAST, 2/08/2019, 11:59.             INDICATIONS:   LUNG NODULES SEEN ON PREVIOUS CT CHEST, SOA, NON SMOKER             TECHNIQUE:   CT images were created without the administration of contrast     material.               PROTOCOL:     Standard imaging protocol performed                RADIATION:     DLP: 367.9mGy*cm          Automated exposure control was utilized to minimize radiation dose.              FINDINGS:         The visualized soft tissue structures at the base of the neck including the     thyroid appear within       normal limits.  There is no lower cervical or axillary adenopathy.  The heart     size is normal.        There is no pericardial effusion.  The aorta and main pulmonary artery are     normal in caliber.        There is no mediastinal or hilar lymphadenopathy by size criteria.  The     esophagus is normal in       course and caliber.             The tracheobronchial tree is patent.  There is no acute airspace consolidation,     pleural effusion,       or pneumothorax.  There is no abnormal bronchial wall thickening.  There are     multiple pulmonary       nodules.  There is a stable 3 mm pulmonary nodule within the right upper lobe on    image 21. There is       a stable 5 mm pulmonary nodule within the right middle lobe on image 37.  There     are stable 3-4 mm       nodules within the right middle lobe seen on images 44 and 45. There is a stable    3 mm pulmonary       nodule within the  left lower lobe on image 45. There is a stable 4 mm pulmonary     nodule within the       lingula on image 48. There is a stable 4 mm pulmonary nodule within the right     lower lobe on image       36.              Visualized portions of the upper abdomen demonstrate no acute findings.  There     are multilevel mild       degenerative changes of the thoracic spine.             CONCLUSION:         1. Stable pulmonary nodules measuring up to 5 mm within the right middle lobe     dating back to       10/8/2018.  Given the stability for 2 years, these are likely benign in     etiology.                RUEL AKINS MD             Electronically Signed and Approved By: RUEL AKINS MD on 10/06/2020 at 8:45                                  Until signed, this is an unconfirmed preliminary report that may contain      errors and is subject to change.                                              BATB1:      D:10/06/20 0845            Assessment      Chronic allergic rhinitis - J30.9            Notes      New Medications      * Alcohol Antiseptic Pads (Alcohol Swabs) 1 EACH MED..PAD: 1 EACH TOPICAL QDAY       #200      Discontinued Medications      * Neomycin/Bacitracin/Polymyxinb (Triple Antibiotic Ointment) 14.17 GM       OINT...G.: 1 APL TOPICAL QDAY #3      * DUPILUMAB (Dupixent) 200 MG/1.14 ML SYRINGE: 400 MG SUBQ ONCE #2      New Referrals      * Immunology/Allergy, Routine         Dx: Chronic allergic rhinitis - J30.9      IMPRESSION:      1.  Severe persistent uncontrolled asthma, patient on triple inhaler therapy and    dupixent injections.      2.  Nasal polyps.      3. Allergic rhinitis/seasonal allergies.      4. Dyspnea.      5. Elevated IgE level at 44.      6. Tobacco abuse with cigarettes in remission.               PLAN:      1.  The patient to continue Singulair and Dymista everyday as prescribed.      2.  The patient to continue Advair and Spiriva everyday as prescribed and rinse     her mouth out  after each use.      3. The patient to continue albuterol inhaler and DuoNebs as needed.      4. I will refer patient to [facility] as patient is wanting to     see an allergist about possibly allergy shots.      5.  The patient to continue Dupixent injections as scheduled.      6. Patient is advised to call the office, 911 or go to the ER with any new or     worsening symptoms.      7. Up-to-date with flu and Pneumovax.      8.  Follow up with Dr. Vee in three months, sooner if needed.            Patient Education      Patient Education Provided:  Acute Asthma      Time Spent:  > 50% /Coord Care            Electronically signed by [facility]  10/15/2020 12:28       Disclaimer: Converted document may not contain table formatting or lab diagrams. Please see [facility] for the authenticated document.

## 2021-06-04 ENCOUNTER — HOSPITAL ENCOUNTER (OUTPATIENT)
Dept: GENERAL RADIOLOGY | Facility: HOSPITAL | Age: 61
Discharge: HOME | End: 2021-06-04
Attending: NURSE PRACTITIONER

## 2021-06-05 NOTE — PROGRESS NOTES
"   Progress Note      Patient Name: Judy Ramey   Patient ID: [id number]   Sex: Female   YOB: 1960    Primary Care Provider: Milton ISAACS   Referring Provider: Milton ISAACS    Visit Date: May 7, 2021    Provider: Enrico Roper MD   Location: [facility]   Location Address: [address] [place] [address], [address] [place] [address]  [id number]   Location Phone: [phone]          Chief Complaint     \"I am coughing a lot more.\"       History Of Present Illness     Judy Ramey is a 60 year old /Black female with past medical history significant for hypothyroidism, GERD, chronic rhinitis, and previous tobacco use who returns today for follow-up of hoarseness.  She was originally seen on 7/31/2019 for bilateral nasal congestion which appeared consistent with inferior turbinate hypertrophy.  She was last seen on 4/9/2021 at which time she had been hoarse since February after developing cough and sore throat at that time.  She was using Advair and her albuterol rescue inhaler as well as Dymista. She is a former smoker who quit 30 years ago.  Examination that day was concerning for laryngeal candidiasis and she was placed on a 14-day course of Diflucan.  She tells me that her voice may have improved a little bit at the end of the Diflucan treatment but ultimately she is still quite hoarse.  It continues to fluctuate from day-to-day.  Over the past 7 to 10 days she tells me she has had increasing cough productive of white mucus.  She has also noticed bilateral facial pressure and more rhinorrhea than usual.  She does report continued nasal congestion.  She denies any fevers or chills.  She has not had any issues with shortness of breath.            Past Medical History  Change in voice; Cough; Dry mouth; GERD (gastroesophageal reflux disease); History of tobacco use; Hoarseness of voice; Hypothyroidism; Laryngeal candidiasis; Low back pain         Past Surgical " History  Colonoscopy; Uterine ablation         Medication List  albuterol sulfate 2.5 mg /3 mL (0.083 %) inhalation solution for nebulization; Claritin 10 mg oral tablet; cyclobenzaprine 10 mg oral tablet; diclofenac sodium 75 mg oral tablet,delayed release (DR/EC); Diflucan 150 mg oral tablet; Dupixent Syringe 200 mg/1.14 mL subcutaneous syringe; Dymista 137-50 mcg/spray nasal spray,non-aerosol; estradiol 0.05 mg/24 hr transdermal patch weekly; Lexapro 10 mg oral tablet; lidocaine 5 % topical adhesive patch,medicated; Lubricating Plus 0.5 % ophthalmic (eye) dropperette; omeprazole 40 mg oral capsule,delayed release(DR/EC); Oysco 500/D 500 mg(1,250mg) -200 unit oral tablet; ProAir HFA 90 mcg/actuation inhalation HFA aerosol inhaler; Singulair 10 mg oral tablet; Synthroid 125 mcg oral tablet; Voltaren 1 % topical gel         Allergy List  Codiene         Family Medical History  Family history of Arthritis; Family history of cancer; Family history of stroke; Family history of heart disease; Family history of diabetes mellitus         Social History  Alcohol (Never); lives with spouse; ; Tobacco (Former); Unemployed         Immunizations  Name Date Admin   Influenza 09/30/2020   Influenza 10/28/2019         Review of Systems  · Constitutional  o Denies  o : fever, night sweats, weight loss  · Eyes  o Denies  o : discharge from eye, impaired vision  · HENT  o Admits  o : *See HPI  · Cardiovascular  o Denies  o : chest pain, irregular heart beats  · Respiratory  o Denies  o : shortness of breath, wheezing, coughing up blood  · Gastrointestinal  o Denies  o : heartburn, reflux, vomiting blood  · Genitourinary  o Denies  o : frequency  · Integument  o Denies  o : rash, skin dryness  · Neurologic  o Denies  o : seizures, loss of balance, loss of consciousness, dizziness  · Endocrine  o Denies  o : cold intolerance, heat intolerance  · Heme-Lymph  o Denies  o : easy bleeding, anemia      Vitals  Date Time BP Position  "Site L\R Cuff Size HR RR TEMP (F) WT  HT  BMI kg/m2 BSA m2 O2 Sat FR L/min FiO2        05/07/2021 08:38 AM        97.2 215lbs 8oz 5'  6\" 34.78 2.13             Physical Examination  · Constitutional  o Appearance  o : well developed, well-nourished, alert and in no acute distress, voice hoarse  · Head and Face  o Head  o :   § Inspection  § : no deformities or lesions  o Face  o :   § Inspection  § : No facial lesions; House-Brackmann I/VI bilaterally  § Palpation  § : No TMJ crepitus nor  muscle tenderness bilaterally  · Eyes  o Vision  o :   § Visual Fields  § : Extraocular movements are intact. No spontaneous or gaze-induced nystagmus.  o Conjunctivae  o : clear  o Sclerae  o : clear  o Pupils and Irises  o : pupils equal, round, and reactive to light.   · Ears, Nose, Mouth and Throat  o Ears  o :   § External Ears  § : appearance within normal limits, no lesions present  § Otoscopic Examination  § : tympanic membrane appearance within normal limits bilaterally without perforations, well-aerated middle ears  § Hearing  § : intact to conversational voice both ears  o Nose  o :   § External Nose  § : appearance normal  § Intranasal Exam  § : mucosa within normal limits, vestibules normal, no intranasal lesions present, septum midline, sinuses non tender to percussion  o Oral Cavity  o :   § Oral Mucosa  § : oral mucosa normal without pallor or cyanosis  § Lips  § : lip appearance normal  § Teeth  § : Partial dentition for age  § Gums  § : gums pink, non-swollen, no bleeding present  § Tongue  § : tongue appearance normal; normal mobility  § Palate  § : hard palate normal, soft palate appearance normal with symmetric mobility  o Throat  o :   § Oropharynx  § : no inflammation or lesions present, tonsils within normal limits  § Hypopharynx  § : Deferred secondary to gag reflex  § Larynx  § : Deferred secondary to gag reflex  · Neck  o Inspection/Palpation  o : normal appearance, no masses or tenderness, " trachea midline; thyroid size normal, nontender, no nodules or masses present on palpation  · Respiratory  o Respiratory Effort  o : breathing unlabored  o Inspection of Chest  o : normal appearance, no retractions  · Cardiovascular  o Heart  o : regular rate and rhythm  · Lymphatic  o Neck  o : no lymphadenopathy present  o Supraclavicular Nodes  o : no lymphadenopathy present  o Preauricular Nodes  o : no lymphadenopathy present  · Skin and Subcutaneous Tissue  o General Inspection  o : Regarding face and neck - there are no rashes present, no lesions present, and no areas of discoloration  · Neurologic  o Cranial Nerves  o : cranial nerves II-XII are grossly intact bilaterally  o Gait and Station  o : normal gait, able to stand without diffculty  · Psychiatric  o Judgement and Insight  o : judgment and insight intact  o Mood and Affect  o : mood normal, affect appropriate          Results     Procedure: Flexible fiberoptic nasolaryngoscopy.    Indications: Persistent hoarseness in a former smoker.    Summary: The patient's bilateral nares were decongested and anesthetized with Afrin and lidocaine sprays respectively. After giving the medications ample time to take effect flexible fiberoptic scope was inserted in the patient's right naris revealing a normal-appearing nasal cavity and nasopharynx. The base of tongue, vallecula, epiglottis, aryepiglottic folds, and arytenoid cartilages are all normal-appearing. The piriform sinuses were normal in appearance. The bilateral false vocal folds are normal in appearance but hyperfunctional upon phonation.  The bilateral true vocal folds are slightly edematous but without lesion.  There is cobblestoning the posterior pharynx.  They adducted and abducted normally. The subglottis was widely patent. The patient tolerated the procedure well.            Assessment  · Acute sinusitis     461.9/J01.90  · GERD (gastroesophageal reflux disease)     530.81/K21.9  · Voice  hoarseness     784.42/R49.0  · Hyperfunctional dysphonia     300.11/F44.4      Plan  · Medications  o Augmentin 875-125 mg oral tablet   SIG: take 1 tablet by oral route every 12 hours for 10 days   DISP: (20) Tablet with 0 refills  Prescribed on 05/07/2021     o omeprazole 40 mg oral capsule,delayed release(DR/EC)   SIG: take 1 capsule by oral route 2 times a day for 30 days   DISP: (60) Capsule with 3 refills  Adjusted on 05/07/2021     o Medications have been Reconciled  o Transition of Care or Provider Policy  · Instructions  o Impressions and findings were discussed Mrs. Ramey at great length. Currently, she is seen for follow-up of hoarseness and laryngeal candidiasis. Unfortunately, she remains quite hoarse but also reports an approximately 10-day history of increasing cough productive of white mucus, rhinorrhea, nasal congestion, and facial pressure. We discussed that this may be consistent with acute rhinosinusitis for which she will be tried on a course of Augmentin. Examination today reveals resolution of the laryngeal candidiasis but she is quite hyperfunctional and there is significant concern for irritation secondary to reflux versus coughing. She will be tried on a course of twice daily omeprazole and we discussed considering speech therapy but she would like to hold off for the time being. We will set up a phone conversation for 6 weeks or sooner if needed.            Electronically Signed by: Enrico Roper MD -Author on May 7, 2021 09:04:50 AM

## 2021-06-05 NOTE — PROGRESS NOTES
Progress Note      Patient Name: Judy Ramey   Patient ID: [id number]   Sex: Female   YOB: 1960    Primary Care Provider: Milton ISAACS   Referring Provider: Milton ISAACS    Visit Date: May 25, 2021    Provider: FERNY Martin   Location: [facility] [place]   Location Address: [address] [place] [address], [address] [place] [address]  [id number]   Location Phone: [phone]          Chief Complaint  · RIGHT SHOULDER AND KNEE PAIN  · NEEDS REFILLS      History Of Present Illness  Judy Ramey is a 60 year old /Black female who presents for evaluation and treatment of:      Patient presents to the office today with complaints of right shoulder and right knee pain.  Patient states that this is something that has been progressively getting worse over time.  She states that the knee has been swelling extensively in her right arm has lost range of motion secondary to the pain in her shoulder.  Patient is requesting MRIs of both joints.  She does also complain of low back pain.  I did explain that an MRI was completed this in 2019.  She states that when she gets the results of the MRIs and she wants to consider being referred to pain management.  Patient does utilize diclofenac.  We did discuss the risk associated with long-term use of diclofenac including the cardiovascular risk as well as the renal risk.  Patient does request something stronger for breakthrough pain.    I also reviewed previous lab work the patient had.    Patient does state that she had a abnormal bone density exam many years ago and would like to have this updated as well.  Blood pressure on arrival is 122/80.  She denies any chest pain shortness breath palpitations at this time.  She does state that she is needing refills on all her medications       Past Medical History  Disease Name Date Onset Notes   Change in voice --  --    Cough --  --    Dry mouth --  --    GERD (gastroesophageal  reflux disease) 08/28/2019 --    History of tobacco use --  --    Hoarseness of voice --  --    Hypothyroidism 08/28/2019 --    Laryngeal candidiasis --  --    Low back pain --  --          Past Surgical History  Procedure Name Date Notes   Colonoscopy --  --    Uterine ablation --  --          Medication List  Name Date Started Instructions   albuterol sulfate 2.5 mg /3 mL (0.083 %) inhalation solution for nebulization 08/26/2020 inhale 3 milliliters (2.5 mg) by nebulization route 3 times per day as needed   Claritin 10 mg oral tablet 02/08/2021 take 1 tablet (10 mg) by oral route once daily   cyclobenzaprine 10 mg oral tablet 02/08/2021 take 1 tablet by oral route 2 times a day as needed   diclofenac sodium 75 mg oral tablet,delayed release (DR/EC) 02/08/2021 take 1 tablet (75 mg) by oral route 2 times per day   Dupixent Syringe 200 mg/1.14 mL subcutaneous syringe  inject 1.14 milliliters (200 mg) by subcutaneous route every 2 weeks in the abdomen, thigh, or upper arm rotating injection sites   Dymista 137-50 mcg/spray nasal spray,non-aerosol 03/17/2021 spray 1 spray in each nostril by intranasal route 2 times per day for 90 days   estradiol 0.05 mg/24 hr transdermal patch weekly  apply 1 patch by transdermal twice a week   Lexapro 10 mg oral tablet 02/08/2021 take 1 tablet (10 mg) by oral route once daily for 90 days   lidocaine 5 % topical adhesive patch,medicated 02/08/2021 apply 1 patch by transdermal route once daily (May wear up to 12hours.) for 90 days   Lubricating Plus 0.5 % ophthalmic (eye) dropperette 01/29/2020 instill 1 drop by ophthalmic route 2 times a day   omeprazole 40 mg oral capsule,delayed release(DR/EC) 05/07/2021 take 1 capsule by oral route 2 times a day for 30 days   Oysco 500/D 500 mg(1,250mg) -200 unit oral tablet 02/08/2021 take 1 tablet by oral route daily for 90 days   ProAir HFA 90 mcg/actuation inhalation HFA aerosol inhaler 08/26/2020 inhale 1 - 2 puffs (90 - 180 mcg) by inhalation  "route every 6 hours as needed for 90 days   Singulair 10 mg oral tablet 02/08/2021 take 1 tablet (10 mg) by oral route once daily in the evening   Synthroid 125 mcg oral tablet 02/08/2021 take 1 tablet (125 mcg) by oral route once daily for 90 days   Voltaren 1 % topical gel 08/26/2020 apply 2 gram to the affected area(s) by topical route 4 times per day         Allergy List  Allergen Name Date Reaction Notes   Codiene --  --  --          Family Medical History  Disease Name Relative/Age Notes   Family history of Arthritis Father/  Mother/   Mother; Father   Family history of cancer Sister/   Sister   Family history of stroke Father/   Father   Family history of heart disease Father/  Mother/   Mother; Father   Family history of diabetes mellitus Father/  Mother/   Mother; Father         Social History  Finding Status Start/Stop Quantity Notes   Alcohol Never --/-- --  06/18/2019 - does not drink, less than 1 drink per day   lives with spouse --  --/-- --  --     --  --/-- --  --    Tobacco Former --/-- --  --    Unemployed --  --/-- --  --          Immunizations  NameDate Admin Mfg Trade Name Lot Number Route Inj VIS Given VIS Publication   [id number]/30/2020 [facility] Fluz[employer] Quadriv[employer] [id number] IM  09/30/2020 08/15/2019   Comments:          Review of Systems  · Constitutional  o Denies  o : fever, fatigue, weight loss, weight gain  · Cardiovascular  o Denies  o : lower extremity edema, claudication, chest pressure, palpitations  · Respiratory  o Denies  o : shortness of breath, wheezing, cough, hemoptysis, dyspnea on exertion  · Gastrointestinal  o Denies  o : nausea, vomiting, diarrhea, constipation, abdominal pain  · Musculoskeletal  o Admits  o : shoulder pain, knee pain      Vitals  Date Time BP Position Site L\R Cuff Size HR RR TEMP (F) WT  HT  BMI kg/m2 BSA m2 O2 Sat FR L/min FiO2 HC       05/25/2021 03:49 /80 Sitting    76 - R  97.8 213lbs 16oz 5'  6\" 34.54 2.13 97 %            Physical " Examination  · Constitutional  o Appearance  o : well-nourished, in no acute distress  · Neck  o Inspection/Palpation  o : normal appearance, no masses or tenderness, trachea midline  o Range of Motion  o : cervical range of motion within normal limits  o Thyroid  o : gland size normal, nontender, no nodules or masses present on palpation  · Respiratory  o Respiratory Effort  o : breathing unlabored  o Inspection of Chest  o : normal appearance  o Auscultation of Lungs  o : normal breath sounds throughout inspiration and expiration  · Cardiovascular  o Heart  o :   § Auscultation of Heart  § : regular rate and rhythm, no murmurs, gallops or rubs  o Peripheral Vascular System  o :   § Extremities  § : no clubbing or edema  · Musculoskeletal  o Spine  o :   § Inspection/Palpation  § : no spinal tenderness, scoliosis or kyphosis present  § Range of Motion  § : spine range of motion normal  o Right Upper Extremity  o :   § Inspection/Palpation  § : tenderness to palpation present, no edema present  o Left Upper Extremity  o :   § Inspection/Palpation  § : no tenderness to palpation, ROM normal  o Right Lower Extremity  o :   § Inspection/Palpation  § : tenderness to palpation present, edema present, no ecchymosis, no redness, neurovascularly intact to L1-S1, all 5 nerves to foot  o Left Lower Extremity  o :   § Inspection/Palpation  § : no joint or limb tenderness to palpation, ROM normal  · Skin and Subcutaneous Tissue  o General Inspection  o : no rashes or lesions present, no areas of discoloration  o Body Hair  o : hair normal for age, general body hair distribution normal for age  o Digits and Nails  o : no clubbing, cyanosis, deformities or edema present, normal appearing nails  · Neurologic  o Mental Status Examination  o :   § Orientation  § : grossly oriented to person, place and time  o Gait and Station  o : normal gait, able to stand without difficulty  · Psychiatric  o Judgement and Insight  o : judgment and  insight intact  o Mood and Affect  o : mood normal, affect appropriate  o Presence of Abnormal Thoughts  o : no hallucinations, no delusions present, no psychotic thoughts  · Back  o Inspection  o : no gross deformities  o Palpation  o : tednerness present to lumbar region, no swelling  o Range of Motion  o : normal range of motion  o Reflexes  o : normal reflexes  o Gait  o : normal gait  o Special Tests  o : negative straight leg raise          Assessment  · Screening for depression     V79.0/Z13.89  · Post menopausal syndrome     V49.81/Z78.0  · Allergic rhinitis due to allergen     477.9/J30.9  · Anxiety disorder     300.00/F41.9  · Cervical pain (neck)     723.1/M54.2  · GERD (gastroesophageal reflux disease)     530.81/K21.9  · Hypothyroidism     244.9/E03.9  · Lumbago     724.2/M54.5  · Knee pain     719.46/M25.569  · Shoulder pain     719.41/M25.519  · Knee swelling     719.06/M25.469  · Decreased ROM of right shoulder     719.51/M25.611      Plan  · Orders  o Annual depression screening, 15 minutes (, [address]44) - V79.0/Z13.89 - 05/25/2021  o ACO-18: Positive screen for clinical depression using a standardized tool and a follow-up plan documented () - V79.0/Z13.89 - 05/25/2021  o DEXA Bone Density, 1 or more sites, axial skeleton [facility] (31302) - V49.81/Z78.0 - 05/25/2021  o ACO-18: Positive screen for clinical depression using a standardized tool and a follow-up plan documented () - - 05/25/2021  o ACO-15: Pneumococcal Vaccine Administered or Previously Received [facility] (4040F) - - 05/25/2021  o ACO-14: Influenza immunization administered or previously received [facility] () - - 05/25/2021  o ACO-39: Current medications updated and reviewed (, 3764F) - - 05/25/2021  o MRI knee right wo contrast (19361) - - 05/25/2021  o MRI shoulder right wo contrast (47012) - - 05/25/2021  · Medications  o Pepcid 20 mg oral tablet   SIG: take 1 tablet by oral route daily as needed   DISP: (90) Tablet with 0  refills  Prescribed on 05/25/2021     o tramadol 50 mg oral tablet   SIG: take 1 tablet by oral route 2 times a day as needed   DISP: (60) Tablet with 0 refills  Prescribed on 05/25/2021     o Claritin 10 mg oral tablet   SIG: take 1 tablet (10 mg) by oral route once daily   DISP: (90) Tablet with 1 refills  Refilled on 05/25/2021     o diclofenac sodium 75 mg oral tablet,delayed release (DR/EC)   SIG: take 1 tablet (75 mg) by oral route 2 times per day   DISP: (180) Tablet with 1 refills  Refilled on 05/25/2021     o Lexapro 10 mg oral tablet   SIG: take 1 tablet (10 mg) by oral route once daily for 90 days   DISP: (90) Tablet with 1 refills  Refilled on 05/25/2021     o Singulair 10 mg oral tablet   SIG: take 1 tablet (10 mg) by oral route once daily in the evening   DISP: (90) Tablet with 1 refills  Refilled on 05/25/2021     o Synthroid 125 mcg oral tablet   SIG: take 1 tablet (125 mcg) by oral route once daily for 90 days   DISP: (90) Tablet with 1 refills  Refilled on 05/25/2021     o Medications have been Reconciled  o Transition of Care or Provider Policy  · Instructions  o Depression Screen completed and scanned into the EMR under the designated folder within the patient's documents.  o Today's PHQ-9 result is 7___  o Stop taking calcium supplements for at least 48 hours prior to your exam. Failure to stop supplements could alter results, and the radiologists will require you to reschedule your test.  o Maintain a healthy weight. Avoid tight fitting clothes. Avoid fried, fatty foods, tomato sauce, chocolate, mint, garlic, onion, alcohol. caffeine. Eat smaller meals, dont lie down after a meal, dont smoke. Elevate the head of your bed 6-9 inches.  o Patient was educated/instructed on their diagnosis, treatment and medications prior to discharge from the clinic today.  o Minutes spent with patient including greater than 50% in Education/Counseling/Care Coordination.  o Time spent with the patient was minutes,  more than 50% face to face.  o Electronically Identified Patient Education Materials Provided Electronically  · Disposition  o Call or Return if symptoms worsen or persist.            Electronically Signed by: FERNY Martin -Author on May 25, 2021 04:40:18 PM

## 2021-06-07 DIAGNOSIS — S46.011A TRAUMATIC COMPLETE TEAR OF RIGHT ROTATOR CUFF, INITIAL ENCOUNTER: ICD-10-CM

## 2021-06-07 DIAGNOSIS — S83.281A ACUTE LATERAL MENISCUS TEAR OF RIGHT KNEE, INITIAL ENCOUNTER: Primary | ICD-10-CM

## 2021-06-30 ENCOUNTER — TELEPHONE (OUTPATIENT)
Dept: FAMILY MEDICINE CLINIC | Facility: CLINIC | Age: 61
End: 2021-06-30

## 2021-07-02 NOTE — TELEPHONE ENCOUNTER
Notified pt Milton had no available apts and that if she felt she needed to be seen sooner, she could go to a Urgent care or call back, she stated she had been placed on a call back list.

## 2021-07-15 VITALS — HEIGHT: 66 IN | BODY MASS INDEX: 34.63 KG/M2 | WEIGHT: 215.5 LBS | TEMPERATURE: 97.2 F

## 2021-07-15 VITALS
OXYGEN SATURATION: 97 % | SYSTOLIC BLOOD PRESSURE: 122 MMHG | HEART RATE: 76 BPM | TEMPERATURE: 97.8 F | WEIGHT: 214 LBS | HEIGHT: 66 IN | DIASTOLIC BLOOD PRESSURE: 80 MMHG | BODY MASS INDEX: 34.39 KG/M2

## 2021-07-26 DIAGNOSIS — F44.4 HYPERFUNCTIONAL DYSPHONIA: ICD-10-CM

## 2021-07-26 DIAGNOSIS — R49.0 VOICE HOARSENESS: Primary | ICD-10-CM

## 2021-07-27 ENCOUNTER — TELEPHONE (OUTPATIENT)
Dept: FAMILY MEDICINE CLINIC | Facility: CLINIC | Age: 61
End: 2021-07-27

## 2021-07-27 DIAGNOSIS — R47.9 SPEECH DISTURBANCE, UNSPECIFIED TYPE: Primary | ICD-10-CM

## 2021-07-30 ENCOUNTER — HOSPITAL ENCOUNTER (OUTPATIENT)
Dept: BONE DENSITY | Facility: HOSPITAL | Age: 61
Discharge: HOME | End: 2021-07-30
Admitting: NURSE PRACTITIONER

## 2021-07-30 DIAGNOSIS — Z78.0 POST-MENOPAUSAL: ICD-10-CM

## 2021-07-30 PROCEDURE — 77080 DXA BONE DENSITY AXIAL: CPT

## 2021-08-06 ENCOUNTER — OFFICE VISIT (OUTPATIENT)
Dept: PHYSICAL THERAPY | Facility: CLINIC | Age: 61
End: 2021-08-06

## 2021-08-06 DIAGNOSIS — R47.9 SPEECH DISTURBANCE, UNSPECIFIED TYPE: ICD-10-CM

## 2021-08-06 DIAGNOSIS — R49.0 MUSCLE TENSION DYSPHONIA: ICD-10-CM

## 2021-08-06 PROCEDURE — 92524 BEHAVRAL QUALIT ANALYS VOICE: CPT | Performed by: SPEECH-LANGUAGE PATHOLOGIST

## 2021-08-06 NOTE — PROGRESS NOTES
Outpatient Speech Language Pathology   Adult Voice Eval       Patient Name: Judy Ramey  : 1960  MRN: [id number]  Today's Date: 2021        Visit Date: 2021     Voice Evaluation    HISTORY  Intake  Date of Service 21  Physician  Milton ISAACS  Diagnosis: Speech disturbance, unspecified  Treatment Diagnosis dysphonia, muscle tension  Hx Hospitalization:  no  Precautions/Contradictions none indicated  Function Level Prior to Admission:  Ongoing vocal hoarseness  Previous Therapy Services:  no  Current Home Health Admission no    HPI  Onset Date: ongoing  Age: 61  Gender: female  History  Patient has a history of chronic vocal hoarseness.  However, in 2021 she began to experience episodes of aphonia and increased hoarseness of voice.  Patient was evaluated by Dr. Enrico Roper (ENT) with scope revealing, candidus on the vocal folds, signs of GERD, and muscle tension (hyperfunctional dysphonia) when voicing.  Patient indicates the vocal hoarseness is progressively getting worse.  Patient here today for evaluation and vocal therapy to reduce vocal hoarseness and increase clarity of voice.    Patient Goals/Expectations: Patient desires to no longer have a raspy voice with increased clarity of voice.      Current Diet Level: regular solids and thin liquids    Social History: Retired and take care of grand daughter during the day.  Patient is a former smoker and quit smoking 25 years ago.  Patient is a social drinker with occasional glass of wine.  Patient is .      Psychosocial History  Usual Living Arrangement:  resides with her   Psychological Hx:  Anxiety   Nutritional Problems:  No swallow problems    Past Medical History  Pertinent Past Medical Hx    Past Medical History:   Diagnosis Date   • Change in voice    • Cough    • Dry mouth    • GERD (gastroesophageal reflux disease) 2019   • History of tobacco use    • Hoarseness of voice    •  Hypothyroidism 08/28/2019   • Laryngeal candidiasis    • Low back pain        Hx Seizures:  no    Abuse  Patient Being Hurt, Hit, Scared? No  Caregiver Neglect? no       Pain  Pain Location:  n/a  Pain Intensity:  0  Pain Scale Used:  Numerical  Management Techniques:  n/a      OBJECTIVE  Oral Motor Exam WFL  Today's Visit Number Speech:  1    ST Functional Testing  Results  Patient is rated on MARITO's Functional Communication Measures for voice at a level 4 with a 40-59% vocal limitation.  With good speech therapy for voice, good attendance and completion of home exercise program patient is expected to reach a Functional Communication Measure for voice at a level 6/7 decreasing vocal limitation to 1-19%.  Voice Assessment  Patient's voice was assessed using portions of the [place] Voice Evaluation, XbyMe Speech for acoustic analysis, personal interview and other informal voice measures.  The results are as follows:  Respiration  Breathing: [thoracic, clavicular, diaphragmatic: thoracic  Maximum Phonation Time:  7.4 seconds  S to Z ratio: 7.82 seconds to 7:56 seconds  Counting on one breath:  8  Respiration Rating:  Moderate decrease in respiratory support to sustain phonation.   Intensity  Conversation: soft  Projecting Voice: difficulty  Intensity Rating:  Adequate boardering on mild impairment  Comments  Patient's Sound Pressure Level (SPL) is measured in decibels (dB).  Average sound pressure level ranges between 68 dB-74 dB.  The results are as follows:    Sentence Level: 69.6 dB SPL [place] at 73.3 dB SPL  Paragraph Level: 72.2 dB SPL Peak  Structured Conversation: 71.5 dB SPL Peak    With increased vocal cord closure and decrease muscle tension, intensity of speech is expected to increase to middle range of 68-74 dB SPL.  Pitch  Mean Fundamental Frequency: 209  hertz  Pitch is adequate for patient's sex/age  Pitch Range 236.5 hertz to 87.5 hertz , averaging a 149 hertz pitch range.    Comments  Patient  demonstrates a decreased pitch range indicating decreased vocal cord flexibility.      Resonance  Oral Resonance:  poor  Resonance Rating:  poor  Comments  Patient demonstrates muscle tension and a posterior placement of voice causing decreased resonance of voice.    Vocal Quality  Patient demonstrates moderate to severe vocal hoarse voice and raspiness.  Aphonic:  yes  Tension Present:  yes  Comments  Patient will benefit from a trial of voice therapy to increase breath support,resonance, vocal cord flexibility and decrease muscle tension for safe voicing of wants and needs during IADLS.     Vocal Hygiene  Caffeine Intake:  yes  Water Intake:  Patient drinks 3-4, 16 ounce bottles of water per day  Vocal Abuse Present:  Chronic throat clearing, GERD  Comments  Patient will benefit from education on vocal hygiene and vocal abuse.     Voice Handicap Interpretation    Voice Handicapped Index (VHI)  (Panda Russ Grywalski, Andrea, Petrona, Candida, & John (1997). The Voice Handicap Index (VHI). American Journal of Speech Language Pathology, 6, 66-70.    * see Scanned VHI reports    Initial Voice Handicap Index:   Physical= 32 Functional= 26 Emotional = 30   Total Score= 88   Patient rates herself as having severe vocal impairment.           A shift of 18 points or more during treatment reflects a shift that is not just a result of VHI variability.    ASSESSMENT  Rehabilitation Potential:  good  Barriers to Learning:  none  Pt presents with limitations, noted below, that impede her ability to safely voice wants and needs during IADLs. The skills of a therapist will be required to safely and effectively implement the following treatment plan to restore maximal level of function.    Goals  Speech Goals  PROBLEMS:  1. Vocal limitation of 40-59% FCM4/7    LTG 1: 12 weeks:  Patient will increase Vocal Functional Measure to 6/7 decreasing vocal limitation to 1-19% for improved phonation and reduced  supraglottic adduction by decreasing hyperfunctional behaviors and improving overall hygiene to facilitate proper voicing required for safe communication of wants and needs during ADLS.     STATUS:  New    STG 1a: 12 weeks:  Patient will learn techniques to improve vocal hygiene and eliminate abusive vocal behaviors and will demonstrate understanding verbally and through self report of behaviors with minimal cueing.     STATUS:  New   STG 1b: 12 weeks.  Patient will use appropriate diaphragmatic breathing techniques to support respiration and easy airflow release during conversation with moderate cueing.      STATUS: New   STG 1C: 12 weeks.  Patient will coordinate proper phonation with proper respiration to increase maximum phonation time to 15 plus seconds.    STATUS: New   STG1D: 12 weeks: Patient will be provided with written information on neck/shoulder exercises to increase muscle awareness and relaxation of laryngeal and cervical tension along with demonstration and will demonstrate understanding with min assist.     STATUS: New   STG1E: 12 weeks:  Patient will engage in exercises to promote increased resonance and release of laryngeal tension with moderate cueing.       STATUS: New   STG 1f: 12weeks:  Patient will increase vocal cord flexibility through use of vocal glides with mod assist    STATUS: New   TREATMENT: Speech Therapy to release tension and improve vocal hygiene to facilitate proper voicing for safe communication of wants and needs during ADLS    PLAN  Frequency (Times/Week):  1  Duration (Weeks):  12    Thank you for the referral  Kimberly Lara MS, CCC-SLP    Medicare Initial Certification  Certification Period: 11/4/2021  I certify that the therapy services are furnished while this patient is under my care.  The services outlined above are required by this patient, and will be reviewed every 90 days.     PHYSICIAN: Milton Zamarripa APRN      DATE:     Please sign and return via fax to  [unfilled]  Thank you, [facility]

## 2021-08-09 ENCOUNTER — OFFICE VISIT (OUTPATIENT)
Dept: FAMILY MEDICINE CLINIC | Facility: CLINIC | Age: 61
End: 2021-08-09

## 2021-08-09 VITALS
BODY MASS INDEX: 36.64 KG/M2 | TEMPERATURE: 98.2 F | DIASTOLIC BLOOD PRESSURE: 70 MMHG | WEIGHT: 228 LBS | HEIGHT: 66 IN | SYSTOLIC BLOOD PRESSURE: 132 MMHG | OXYGEN SATURATION: 96 % | HEART RATE: 55 BPM

## 2021-08-09 DIAGNOSIS — B35.1 ONYCHOMYCOSIS: ICD-10-CM

## 2021-08-09 DIAGNOSIS — M77.01 MEDIAL EPICONDYLITIS OF ELBOW, RIGHT: Primary | ICD-10-CM

## 2021-08-09 DIAGNOSIS — M25.511 CHRONIC RIGHT SHOULDER PAIN: ICD-10-CM

## 2021-08-09 DIAGNOSIS — R49.0 DYSPHONIA: ICD-10-CM

## 2021-08-09 DIAGNOSIS — K21.9 GASTROESOPHAGEAL REFLUX DISEASE WITHOUT ESOPHAGITIS: ICD-10-CM

## 2021-08-09 DIAGNOSIS — G89.29 CHRONIC RIGHT SHOULDER PAIN: ICD-10-CM

## 2021-08-09 PROCEDURE — 99213 OFFICE O/P EST LOW 20 MIN: CPT | Performed by: NURSE PRACTITIONER

## 2021-08-09 RX ORDER — IPRATROPIUM BROMIDE AND ALBUTEROL SULFATE 2.5; .5 MG/3ML; MG/3ML
3 SOLUTION RESPIRATORY (INHALATION)
COMMUNITY
Start: 2021-05-26 | End: 2023-02-09 | Stop reason: SDUPTHER

## 2021-08-09 RX ORDER — AZELASTINE HYDROCHLORIDE, FLUTICASONE PROPIONATE 137; 50 UG/1; UG/1
SPRAY, METERED NASAL
COMMUNITY
Start: 2021-03-17 | End: 2021-09-15 | Stop reason: ALTCHOICE

## 2021-08-09 RX ORDER — ISOPROPYL ALCOHOL 70 ML/100ML
SWAB TOPICAL
COMMUNITY
Start: 2021-05-23 | End: 2022-02-24 | Stop reason: SDUPTHER

## 2021-08-09 RX ORDER — LEVOTHYROXINE SODIUM 112 UG/1
TABLET ORAL
COMMUNITY
End: 2021-11-01

## 2021-08-09 RX ORDER — LORATADINE 10 MG/1
TABLET ORAL
COMMUNITY
Start: 2021-05-26 | End: 2022-02-24 | Stop reason: SDUPTHER

## 2021-08-09 RX ORDER — TERBINAFINE HYDROCHLORIDE 250 MG/1
250 TABLET ORAL DAILY
Qty: 30 TABLET | Refills: 0 | Status: SHIPPED | OUTPATIENT
Start: 2021-08-09 | End: 2021-10-20

## 2021-08-09 RX ORDER — TRAMADOL HYDROCHLORIDE 50 MG/1
50 TABLET ORAL EVERY 6 HOURS PRN
COMMUNITY
Start: 2021-05-26 | End: 2022-11-16

## 2021-08-09 RX ORDER — ESCITALOPRAM OXALATE 10 MG/1
TABLET ORAL
COMMUNITY
Start: 2021-05-26 | End: 2021-08-13 | Stop reason: SDUPTHER

## 2021-08-09 RX ORDER — ESTRADIOL 0.05 MG/D
PATCH TRANSDERMAL
COMMUNITY
End: 2021-11-01

## 2021-08-09 RX ORDER — DICLOFENAC SODIUM 75 MG/1
75 TABLET, DELAYED RELEASE ORAL 2 TIMES DAILY
COMMUNITY
Start: 2021-05-26 | End: 2022-03-16

## 2021-08-09 RX ORDER — OMEPRAZOLE 40 MG/1
CAPSULE, DELAYED RELEASE ORAL
COMMUNITY
Start: 2021-05-07 | End: 2021-08-10 | Stop reason: SDUPTHER

## 2021-08-09 RX ORDER — MONTELUKAST SODIUM 10 MG/1
TABLET ORAL
COMMUNITY
End: 2022-02-24 | Stop reason: SDUPTHER

## 2021-08-09 RX ORDER — DUPILUMAB 200 MG/1.14ML
INJECTION, SOLUTION SUBCUTANEOUS
COMMUNITY
Start: 2021-08-05 | End: 2021-10-25 | Stop reason: SDUPTHER

## 2021-08-09 NOTE — PROGRESS NOTES
"Chief Complaint  Arm Pain (Right lower arm )    Subjective          Judy Ramey presents to [facility]  Patient presents to the office today with complaints of right lower arm pain.  She states the pain is more prominent on the inside of her arm near her elbow.  Patient states that she has a hard time lifting heavy objects.  Patient states that this has been present for few weeks.  She also states that she would like a referral to gastroenterology.  She does state that the ENT feels that her dysphonia is from acid reflux.  I did also review patient's shoulder MRI.  I did suggest doing physical therapy and patient states that she has done physical therapy on her shoulder and it did not improve her symptoms.  I did discuss referring patient to orthopedics for further evaluation.      Objective   Vital Signs:   /70 (BP Location: Left arm, Patient Position: Sitting, Cuff Size: Adult)   Pulse 55   Temp 98.2 °F (36.8 °C) (Temporal)   Ht 167.6 cm (66\")   Wt 103 kg (228 lb)   SpO2 96%   BMI 36.80 kg/m²     Physical Exam  Vitals reviewed.   Constitutional:       Appearance: Normal appearance.   Cardiovascular:      Rate and Rhythm: Normal rate and regular rhythm.      Heart sounds: Normal heart sounds, S1 normal and S2 normal. No murmur heard.     Pulmonary:      Effort: Pulmonary effort is normal. No respiratory distress.      Breath sounds: Normal breath sounds.   Musculoskeletal:      Right shoulder: Tenderness present.      Right elbow: Tenderness present in medial epicondyle.        Arms:    Skin:     General: Skin is warm and dry.   Neurological:      Mental Status: She is alert and oriented to person, place, and time.   Psychiatric:         Attention and Perception: Attention normal.         Mood and Affect: Mood normal.         Behavior: Behavior normal.        Result Review :                 Assessment and Plan    Diagnoses and all orders for this visit:    1. " Medial epicondylitis of elbow, right (Primary)    2. Dysphonia  -     Ambulatory Referral to Gastroenterology    3. Gastroesophageal reflux disease without esophagitis  -     Ambulatory Referral to Gastroenterology    4. Chronic right shoulder pain  -     Ambulatory Referral to Orthopedic Surgery    5. Onychomycosis  -     terbinafine (lamiSIL) 250 MG tablet; Take 1 tablet by mouth Daily.  Dispense: 30 tablet; Refill: 0    I did discuss the use of a pneumatic arm brace for her medial epicondylitis.  I also encourage patient to continue taking her diclofenac for the anti-inflammatory    Follow Up   Return in about 6 months (around 2/9/2022) for Recheck.  Patient was given instructions and counseling regarding her condition or for health maintenance advice. Please see specific information pulled into the AVS if appropriate.

## 2021-08-10 RX ORDER — OMEPRAZOLE 40 MG/1
40 CAPSULE, DELAYED RELEASE ORAL DAILY
Qty: 90 CAPSULE | Refills: 0 | Status: SHIPPED | OUTPATIENT
Start: 2021-08-10 | End: 2021-10-20

## 2021-08-10 NOTE — TELEPHONE ENCOUNTER
Caller: Judy Ramey    Relationship: Self    Best call back number: [phone]    Medication needed:   Requested Prescriptions     Pending Prescriptions Disp Refills   • omeprazole (priLOSEC) 40 MG capsule         When do you need the refill by: ASAP    Does the patient have less than a 3 day supply:  [x] Yes  [] No    What is the patient's preferred pharmacy:   [facility]   [address] [place] [address]  [phone]  [phone]

## 2021-08-13 RX ORDER — ESCITALOPRAM OXALATE 20 MG/1
20 TABLET ORAL DAILY
Qty: 90 TABLET | Refills: 1 | Status: SHIPPED | OUTPATIENT
Start: 2021-08-13 | End: 2022-04-18 | Stop reason: SDUPTHER

## 2021-08-17 ENCOUNTER — TREATMENT (OUTPATIENT)
Dept: PHYSICAL THERAPY | Facility: CLINIC | Age: 61
End: 2021-08-17

## 2021-08-17 DIAGNOSIS — R49.0 MUSCLE TENSION DYSPHONIA: Primary | ICD-10-CM

## 2021-08-17 PROCEDURE — 92507 TX SP LANG VOICE COMM INDIV: CPT | Performed by: SPEECH-LANGUAGE PATHOLOGIST

## 2021-08-17 NOTE — PROGRESS NOTES
Outpatient Speech Language Pathology   Adult Speech Language Cognitive Treatment Note       Patient Name: Judy Ramey  : 1960  MRN: [id number]  Today's Date: 2021         Visit Date: 2021         Today Visit number: 2  Medical Diagnosis:speech disturbance, unspecified  Treatment Diagnosis:muscle tension dysphonia    ST Subjective  SUBJECTIVE  Patient Comments:    Patient indicates understanding of stretches, and use of diaphragmatic breathing for breath support to sustain voicing.      ST Objective Speech  OBJECTIVE  Patient receiving voice therapy to decrease muscle tension dysphonia leading to a hoarse voice through use of breath support, stretching, vocal cord stretching and easy onsets with forward focus of voice.          ST Speech Therapy  Speech Therapy      GOALS ACTIVITY PERFORMED TRIALS COMPLETED LEVEL OF CUEING REQUIRED    STG 1a: 12 weeks:  Patient will learn techniques to improve vocal hygiene and eliminate abusive vocal behaviors and will demonstrate understanding verbally and through self report of behaviors with minimal cueing.        education -vocal hygiene  -GERD control  -decrease throat clearing and exercises Max assist to teach with patient given a teaching handout and ample time to ask questions   STG 1b: 12 weeks.  Patient will use appropriate diaphragmatic breathing techniques to support respiration and easy airflow release during conversation with moderate cueing.               Diaphragmatic breathing In prone position    Controlled relaxed breathing:  3 second inhalation, 6 second exhalation times 10 repetitions  4 second inhalation, 8 second exhalation times 5  Easy relaxed breathing for 3 minutes    Repeated above exercises with exhale on /s/ and attempted to add voice on /m/     Total assist to demonstrate with patient returning demonstration.  Patient required mirror work.    Handout of relaxed breathing techniques given with an exercise plan to continue at  home 5 repetitions of relaxed breathing five times per day.     STG 1C: 12 weeks.  Patient will coordinate proper phonation with proper respiration to increase maximum phonation time to 15 plus seconds.                          STATUS: New                      STG1D: 12 weeks: Patient will be provided with written information on neck/shoulder exercises to increase muscle awareness and relaxation of laryngeal and cervical tension along with demonstration and will demonstrate understanding with min assist.                           STATUS: New             Neck and shoulder exercises -five repetition of each exercise    - chin tuck  -hands over head and stretch  -shoulder rolls  -elevate shoulders and relax  -head tilt with easy applied pressure on cheek  -head turn and look up  -exaggerated reach Total assist to teach with patient returning demonstration.  Teaching handout given to patient to complete exercises at home.      STG1E: 12 weeks:  Patient will engage in exercises to promote increased resonance and release of laryngeal tension with moderate cueing.                             STATUS: New                             STG 1f: 12weeks:  Patient will increase vocal cord flexibility through use of vocal glides with mod assist                          STATUS: New                      ASSESSMENT/PLAN  Need for skilled care:Patient requires the skills of a therapist to provide total assist to teach diaphragmatic, relaxed breathing and cervical-laryngeal stretching to release muscle tension to promote safe voicing of wants and needs during IADLS.    Progress towards goals: Progressing    Barriers to Achieving Goals:  none    Progress towards goals:   Changes to Plan: continue plan of care        Kimberly Lara MS, CCC-SLP

## 2021-08-23 ENCOUNTER — OFFICE VISIT (OUTPATIENT)
Dept: ORTHOPEDIC SURGERY | Facility: CLINIC | Age: 61
End: 2021-08-23

## 2021-08-23 VITALS — HEIGHT: 66 IN | BODY MASS INDEX: 34.33 KG/M2 | HEART RATE: 82 BPM | WEIGHT: 213.6 LBS | OXYGEN SATURATION: 96 %

## 2021-08-23 DIAGNOSIS — M75.41 IMPINGEMENT SYNDROME OF RIGHT SHOULDER: Primary | ICD-10-CM

## 2021-08-23 DIAGNOSIS — M25.511 RIGHT SHOULDER PAIN, UNSPECIFIED CHRONICITY: ICD-10-CM

## 2021-08-23 PROCEDURE — 20610 DRAIN/INJ JOINT/BURSA W/O US: CPT | Performed by: ORTHOPAEDIC SURGERY

## 2021-08-23 PROCEDURE — 99203 OFFICE O/P NEW LOW 30 MIN: CPT | Performed by: ORTHOPAEDIC SURGERY

## 2021-08-23 RX ORDER — METHYLPREDNISOLONE ACETATE 80 MG/ML
80 INJECTION, SUSPENSION INTRA-ARTICULAR; INTRALESIONAL; INTRAMUSCULAR; SOFT TISSUE
Status: COMPLETED | OUTPATIENT
Start: 2021-08-23 | End: 2021-08-23

## 2021-08-23 RX ORDER — LIDOCAINE HYDROCHLORIDE 10 MG/ML
9 INJECTION, SOLUTION INFILTRATION; PERINEURAL
Status: COMPLETED | OUTPATIENT
Start: 2021-08-23 | End: 2021-08-23

## 2021-08-23 RX ADMIN — LIDOCAINE HYDROCHLORIDE 9 ML: 10 INJECTION, SOLUTION INFILTRATION; PERINEURAL at 11:42

## 2021-08-23 RX ADMIN — METHYLPREDNISOLONE ACETATE 80 MG: 80 INJECTION, SUSPENSION INTRA-ARTICULAR; INTRALESIONAL; INTRAMUSCULAR; SOFT TISSUE at 11:42

## 2021-08-23 NOTE — PROGRESS NOTES
"Chief Complaint  Pain of the Right Shoulder     Subjective      Judy Ramey presents to [facility] for an evaluation of right shoulder. She has been having shoulder pain for 5-6 years. Patient states that in the last few months the pain in her shoulder has increasingly gotten worse. She denies any specific injury or trauma to her shoulder.     No Known Allergies     Social History     Socioeconomic History   • Marital status:      Spouse name: Not on file   • Number of children: Not on file   • Years of education: Not on file   • Highest education level: Not on file   Tobacco Use   • Smoking status: Former Smoker   • Smokeless tobacco: Never Used   Vaping Use   • Vaping Use: Never used   Substance and Sexual Activity   • Alcohol use: Never   • Drug use: Never   • Sexual activity: Defer        Review of Systems     Objective   Vital Signs:   Pulse 82   Ht 167.6 cm (66\")   Wt 96.9 kg (213 lb 9.6 oz)   SpO2 96%   BMI 34.48 kg/m²       Physical Exam  Constitutional:       Appearance: Normal appearance. He is well-developed and normal weight.   HENT:      Head: Normocephalic.      Right Ear: Hearing and external ear normal.      Left Ear: Hearing and external ear normal.      Nose: Nose normal.   Eyes:      Conjunctiva/sclera: Conjunctivae normal.   Cardiovascular:      Rate and Rhythm: Normal rate.   Pulmonary:      Effort: Pulmonary effort is normal.      Breath sounds: No wheezing or rales.   Abdominal:      Palpations: Abdomen is soft.      Tenderness: There is no abdominal tenderness.   Musculoskeletal:      Cervical back: Normal range of motion.   Skin:     Findings: No rash.   Neurological:      Mental Status: She is alert and oriented to person, place, and time.   Psychiatric:         Mood and Affect: Mood and affect normal.         Judgment: Judgment normal.       Ortho Exam      RIGHT SHOULDER: Tender AC joint. Tender subacromial bursa. Pain with cross body " adduction. Pain with IR. IR to L5. Full forward flexion. Positive impingement signs. No swelling, skin discoloration or atrophy. Full cervical range of motion. No winging of the scapula. Good tone of deltoid, triceps, wrist extensors and wrist flexors. Sensation grossly intact. Neurovascular intact. Skin intact. Radial pulse 2+, ulnar pulse 2+.       Large Joint Arthrocentesis: R subacromial bursa  Date/Time: 2021 11:42 AM  Consent given by: patient  Site marked: site marked  Timeout: Immediately prior to procedure a time out was called to verify the correct patient, procedure, equipment, support staff and site/side marked as required   Supporting Documentation  Indications: pain   Procedure Details  Location: shoulder - R subacromial bursa  Needle size: 22 G  Medications administered: 9 mL lidocaine 1 %; 80 mg methylPREDNISolone acetate 80 MG/ML  Patient tolerance: patient tolerated the procedure well with no immediate complications              Imaging Results (Most Recent)     Procedure Component Value Units Date/Time    XR Scapula Right [[id number]] Resulted: 21     Updated: 21           Result Review :           [facility] Diagnostic Img            PACS RADIOLOGY REPORT     Patient: TOBY HERNANDEZ     Acct: [id number]     Report: #[id number]  UNIT #: [id number]     DOS: 2021 1315     Order #: [id number]  Location: [facility]     : 1960  ORDERING: [place]  DICTATING: Obi Rodriguez  ADAN #: [id number]     EXAM: SHOUR - MRI RIGHT SHOULDER wo CONTRAST  REASON FOR EXAM:   REASON FOR VISIT: SHOULDER PAIN, DECREASED MOTION, KNEE PAIN SWELLIN  PROCEDURE: MRI RIGHT SHOULDER WO CONTRAST         COMPARISON: None         INDICATIONS: WORSENING RIGHT SHOULDER PAIN. NO KNOWN INJURY.         TECHNIQUE: A variety of imaging planes and parameters were utilized for visualization of suspected     pathology.  Images were performed without  contrast.           FINDINGS:     MRI of the right shoulder without any previous MRI or CT or plain film image for comparison reveals     that there is mild-moderate arthritis and superior and inferior hypertrophic spurring in the right     acromioclavicular joint causing mild narrowing of the subacromial arch space.  There is mild     inflammation edema in the bone marrow in the distal head of the right clavicle and in the right     acromial process adjacent to the right acromioclavicular joint.  There is a questionable small tiny     amount of fluid in the subacromial and subdeltoid bursal areas.  Tendinopathy and mild partial     tearing can be seen along the superior aspect of the insertion of the supraspinatus tendon of the     right rotator cuff.  No evidence of full thickness or complete right rotator cuff tear the biceps     tendon is normal in the bicipital groove.  The biceps labral anchor complex is normal.  There is a     small right glenohumeral shoulder joint effusion.  No evidence of fracture or dislocation or bone     tumor or osteomyelitis in the right shoulder joint.  No evidence of mass or adenopathy in the right     axillary soft tissues.  There is a suspicion of mild  tearing of the right anterior and posterior     glenohumeral shoulder joint capsule and ligament         CONCLUSION:     1. Mild-moderate arthritis and inflammation and inferior hypertrophic spurring in the right     acromioclavicular joint causing mild-moderate narrowing of the subacromial arch space.    2. Tendinopathy and partial tearing along the superior surface of the insertion of the     supraspinatus tendon of the right rotator cuff.  No evidence of full thickness or complete tearing     of the right rotator cuff.    3. Suspicion of mild tearing of the right anterior posterior and inferior glenohumeral shoulder     joint capsule and ligament           Assessment and Plan     DX: Right shoulder impingement     Discussed  treatment plans and diagnosis with the patient. Right shoulder scope vs conservative management were discussed. Patient wishes to try conservative management options first. She was given a right shoulder steroid injection and tolerated this procedure well. She will consider surgical intervention.     Discussed surgery., Risks/benefits discussed with patient including, but not limited to: infection, bleeding, neurovascular damage, re-rupture, aesthetic deformity, need for further surgery, and death. and Call or return if worsening symptoms.    Follow Up     4-6 weeks.       Patient was given instructions and counseling regarding her condition or for health maintenance advice. Please see specific information pulled into the AVS if appropriate.     Scribed for Brandon De La Torre MD by Rebekah Sen.  08/23/21   09:45 EDT      I have personally performed the services described in this document as scribed by the above individual and it is both accurate and complete. Brandon De La Torre MD 08/23/21

## 2021-08-24 ENCOUNTER — TREATMENT (OUTPATIENT)
Dept: PHYSICAL THERAPY | Facility: CLINIC | Age: 61
End: 2021-08-24

## 2021-08-24 DIAGNOSIS — R49.0 MUSCLE TENSION DYSPHONIA: Primary | ICD-10-CM

## 2021-08-24 PROCEDURE — 92507 TX SP LANG VOICE COMM INDIV: CPT | Performed by: SPEECH-LANGUAGE PATHOLOGIST

## 2021-08-24 NOTE — PROGRESS NOTES
Outpatient Speech Language Pathology   Adult Speech Language Cognitive Treatment Note       Patient Name: Judy Ramey  : 1960  MRN: [id number]  Today's Date: 2021         Visit Date: 2021         Today Visit number: 3  Medical Diagnosis:speech disturbance, unspecified  Treatment Diagnosis:muscle tension dysphonia     Subjective  SUBJECTIVE  Patient Comments:    Patient verbalized that she definitely noticed a difference in her voice when she worked on her breath support.      ST Objective Speech  OBJECTIVE  Patient receiving voice therapy to decrease muscle tension dysphonia leading to a hoarse voice through use of breath support, stretching, vocal cord stretching and easy onsets with forward focus of voice.          ST Speech Therapy  Speech Therapy      GOALS ACTIVITY PERFORMED TRIALS COMPLETED LEVEL OF CUEING REQUIRED    STG 1a: 12 weeks:  Patient will learn techniques to improve vocal hygiene and eliminate abusive vocal behaviors and will demonstrate understanding verbally and through self report of behaviors with minimal cueing.        education -vocal hygiene  -GERD control  -decrease throat clearing and exercises Reduced to min assist with patient indicating that she bought a wedged pillow to decreased GERD when she sleeps.     STG 1b: 12 weeks.  Patient will use appropriate diaphragmatic breathing techniques to support respiration and easy airflow release during conversation with moderate cueing.               Diaphragmatic breathing In upright position    Controlled relaxed breathing:  3 second inhalation, 6 second exhalation times 10 repetitions  Easy relaxed breathing for 3 minutes    Repeated above exercises with exhale on /s/ and attempted to add voice on /m/ and extended into voicing /a/  Patient continued with continuous breath stream and voicing counting 1-20 with a breath after every 4 numbers times 5  Breath after every 10  numbers 1-50.         Max assist     STG 1C:  12 weeks.  Patient will coordinate proper phonation with proper respiration to increase maximum phonation time to 15 plus seconds.                          STATUS: New                      STG1D: 12 weeks: Patient will be provided with written information on neck/shoulder exercises to increase muscle awareness and relaxation of laryngeal and cervical tension along with demonstration and will demonstrate understanding with min assist.                           STATUS: New             Neck and shoulder exercises -five repetition of each exercise    - chin tuck  -hands over head and stretch  -shoulder rolls  -elevate shoulders and relax  -head tilt with easy applied pressure on cheek  -head turn and look up  -exaggerated reach Reduced to mod assist     STG1E: 12 weeks:  Patient will engage in exercises to promote increased resonance and release of laryngeal tension with moderate cueing.                             STATUS: New                   Frontal focus technique  Frontal placement of voice on /m/ times 10    Frontal placement of voice on /m/ and opening vocal tract to mmmm-a, mmmm-ay, mmmm-e, mmmm-ow, mmm-ooo . Max assist for tongue placement and relaxed muscle tension.          STG 1f: 12weeks:  Patient will increase vocal cord flexibility through use of vocal glides with mod assist                          STATUS: New                  * Patient given a set of respiration and resonance exercises to complete at home.      ASSESSMENT/PLAN  Need for skilled care:Patient requires the skills of a therapist to provide mod to max  assist to teach diaphragmatic, relaxed breathing and cervical-laryngeal stretching to release muscle tension to promote safe voicing of wants and needs during IADLS.    Progress towards goals: Progressing    Barriers to Achieving Goals:  none    Progress towards goals:   Changes to Plan: continue plan of care        Kimberly Lara MS, CCC-SLP

## 2021-08-24 NOTE — PROGRESS NOTES
"Chief Complaint  Pain of the Right Shoulder     Subjective      Judy Ramey presents to [facility] for an evaluation of right shoulder. She has been having shoulder pain for 5-6 years. Patient states that in the last few months the pain in her shoulder has increasingly gotten worse. She denies any specific injury or trauma to her shoulder.     No Known Allergies     Social History     Socioeconomic History   • Marital status:      Spouse name: Not on file   • Number of children: Not on file   • Years of education: Not on file   • Highest education level: Not on file   Tobacco Use   • Smoking status: Former Smoker   • Smokeless tobacco: Never Used   Vaping Use   • Vaping Use: Never used   Substance and Sexual Activity   • Alcohol use: Never   • Drug use: Never   • Sexual activity: Defer        Review of Systems     Objective   Vital Signs:   Pulse 82   Ht 167.6 cm (66\")   Wt 96.9 kg (213 lb 9.6 oz)   SpO2 96%   BMI 34.48 kg/m²       Physical Exam  Constitutional:       Appearance: Normal appearance. He is well-developed and normal weight.   HENT:      Head: Normocephalic.      Right Ear: Hearing and external ear normal.      Left Ear: Hearing and external ear normal.      Nose: Nose normal.   Eyes:      Conjunctiva/sclera: Conjunctivae normal.   Cardiovascular:      Rate and Rhythm: Normal rate.   Pulmonary:      Effort: Pulmonary effort is normal.      Breath sounds: No wheezing or rales.   Abdominal:      Palpations: Abdomen is soft.      Tenderness: There is no abdominal tenderness.   Musculoskeletal:      Cervical back: Normal range of motion.   Skin:     Findings: No rash.   Neurological:      Mental Status: He is alert and oriented to person, place, and time.   Psychiatric:         Mood and Affect: Mood and affect normal.         Judgment: Judgment normal.       Ortho Exam      RIGHT SHOULDER: Tender AC joint. Tender subacromial bursa. Pain with cross body " adduction. Pain with IR. IR to L5. Full forward flexion. Positive impingement signs. No swelling, skin discoloration or atrophy. Full cervical range of motion. No winging of the scapula. Good tone of deltoid, triceps, wrist extensors and wrist flexors. Sensation grossly intact. Neurovascular intact. Skin intact. Radial pulse 2+, ulnar pulse 2+.         Large Joint Arthrocentesis: R subacromial bursa  Date/Time: 2021 11:42 AM  Consent given by: patient  Site marked: site marked  Timeout: Immediately prior to procedure a time out was called to verify the correct patient, procedure, equipment, support staff and site/side marked as required   Supporting Documentation  Indications: pain   Procedure Details  Location: shoulder - R subacromial bursa  Needle size: 22 G  Medications administered: 9 mL lidocaine 1 %; 80 mg methylPREDNISolone acetate 80 MG/ML  Patient tolerance: patient tolerated the procedure well with no immediate complications      Procedures        Imaging Results (Most Recent)     Procedure Component Value Units Date/Time    XR Scapula Right [[id number]] Resulted: 21     Updated: 21           Result Review :        [facility] Diagnostic Img            PACS RADIOLOGY REPORT     Patient: TOBY HERNANDEZ     Acct: [id number]     Report: #[id number]  UNIT #: [id number]     DOS: 2021 1315     Order #: [id number]  Location: [facility]     : 1960  ORDERING: [place]  DICTATING: Obi Rodriguez  READAN #: [id number]     EXAM: SHOUR - MRI RIGHT SHOULDER wo CONTRAST  REASON FOR EXAM:   REASON FOR VISIT: SHOULDER PAIN, DECREASED MOTION, KNEE PAIN SWELLIN  PROCEDURE: MRI RIGHT SHOULDER WO CONTRAST         COMPARISON: None         INDICATIONS: WORSENING RIGHT SHOULDER PAIN. NO KNOWN INJURY.         TECHNIQUE: A variety of imaging planes and parameters were utilized for visualization of suspected     pathology.  Images were performed  without contrast.           FINDINGS:     MRI of the right shoulder without any previous MRI or CT or plain film image for comparison reveals     that there is mild-moderate arthritis and superior and inferior hypertrophic spurring in the right     acromioclavicular joint causing mild narrowing of the subacromial arch space.  There is mild     inflammation edema in the bone marrow in the distal head of the right clavicle and in the right     acromial process adjacent to the right acromioclavicular joint.  There is a questionable small tiny     amount of fluid in the subacromial and subdeltoid bursal areas.  Tendinopathy and mild partial     tearing can be seen along the superior aspect of the insertion of the supraspinatus tendon of the     right rotator cuff.  No evidence of full thickness or complete right rotator cuff tear the biceps     tendon is normal in the bicipital groove.  The biceps labral anchor complex is normal.  There is a     small right glenohumeral shoulder joint effusion.  No evidence of fracture or dislocation or bone     tumor or osteomyelitis in the right shoulder joint.  No evidence of mass or adenopathy in the right     axillary soft tissues.  There is a suspicion of mild  tearing of the right anterior and posterior     glenohumeral shoulder joint capsule and ligament         CONCLUSION:     1. Mild-moderate arthritis and inflammation and inferior hypertrophic spurring in the right     acromioclavicular joint causing mild-moderate narrowing of the subacromial arch space.    2. Tendinopathy and partial tearing along the superior surface of the insertion of the     supraspinatus tendon of the right rotator cuff.  No evidence of full thickness or complete tearing     of the right rotator cuff.    3. Suspicion of mild tearing of the right anterior posterior and inferior glenohumeral shoulder     joint capsule and ligament          X-Ray Report:  Right shoulder(s) X-Ray  Indication: Evaluation of  right shoulder   AP and Lateral view(s)  Findings: No acute fracture or dislocation. Mild to moderate degenerative changes are present.   Prior studies available for comparison: no        Assessment and Plan     DX: Right shoulder impingement     Discussed treatment plans and diagnosis with the patient. Right shoulder scope vs conservative management were discussed. Patient wishes to try conservative management options first. She was given a right shoulder steroid injection and tolerated this procedure well. She will consider surgical intervention.     Discussed surgery., Risks/benefits discussed with patient including, but not limited to: infection, bleeding, neurovascular damage, re-rupture, aesthetic deformity, need for further surgery, and death. and Call or return if worsening symptoms.     Follow Up      4-6 weeks.       Patient was given instructions and counseling regarding her condition or for health maintenance advice. Please see specific information pulled into the AVS if appropriate.     Scribed for Brandon De La Torre MD by Rebekah Sen.  08/24/21   13:49 EDT

## 2021-08-31 ENCOUNTER — TREATMENT (OUTPATIENT)
Dept: PHYSICAL THERAPY | Facility: CLINIC | Age: 61
End: 2021-08-31

## 2021-08-31 DIAGNOSIS — R49.0 MUSCLE TENSION DYSPHONIA: Primary | ICD-10-CM

## 2021-08-31 PROCEDURE — 92507 TX SP LANG VOICE COMM INDIV: CPT | Performed by: SPEECH-LANGUAGE PATHOLOGIST

## 2021-08-31 NOTE — PROGRESS NOTES
Outpatient Speech Language Pathology   Adult Speech Language Cognitive Treatment Note       Patient Name: Judy Ramey  : 1960  MRN: [id number]  Today's Date: 2021         Visit Date: 2021         Today Visit number: 4  Medical Diagnosis:speech disturbance, unspecified  Treatment Diagnosis:muscle tension dysphonia    ST Subjective  SUBJECTIVE  Patient Comments:  Patient verified by demonstrating understanding of diaphragmatic exercises    ST Objective Speech  OBJECTIVE  Patient receiving voice therapy to decrease muscle tension dysphonia leading to a hoarse voice through use of breath support, stretching, vocal cord stretching and easy onsets with forward focus of voice.          ST Speech Therapy  Speech Therapy      GOALS ACTIVITY PERFORMED TRIALS COMPLETED LEVEL OF CUEING REQUIRED    STG 1a: 12 weeks:  Patient will learn techniques to improve vocal hygiene and eliminate abusive vocal behaviors and will demonstrate understanding verbally and through self report of behaviors with minimal cueing.             STG 1b: 12 weeks.  Patient will use appropriate diaphragmatic breathing techniques to support respiration and easy airflow release during conversation with moderate cueing.               Diaphragmatic breathing Counting 1-20    Breath support to sustain voicing on resonant exercises may, me mow    Breath support while chanting   Mod to max assist through demonstration     STG 1C: 12 weeks.  Patient will coordinate proper phonation with proper respiration to increase maximum phonation time to 15 plus seconds.                          STATUS: New                      STG1D: 12 weeks: Patient will be provided with written information on neck/shoulder exercises to increase muscle awareness and relaxation of laryngeal and cervical tension along with demonstration and will demonstrate understanding with min assist.                           STATUS: New                    STG1E: 12 weeks:   Patient will engage in exercises to promote increased resonance and release of laryngeal tension with moderate cueing.                             STATUS: New                   Frontal focus technique    Frontal placement of voice on /m/ and opening vocal tract to mmmm-a, mmmm-ay, mmmm-e, mmmm-ow, mmm-ooo .    -frontal focus on initial /m/ loaded words  -frontal focus on intial /m/ loaded two syllable words  -frontal focus techniques on initial /m/ loaded words in phrases    -Semi occluded vocal tract -bubble blow techniqes with easy voicing and trills to decrease muscle tension Max assist for tongue placement and relaxed muscle tension.          STG 1f: 12weeks:  Patient will increase vocal cord flexibility through use of vocal glides with mod assist                          STATUS: New                  * Patient given a set of respiration and resonance exercises to complete at home.      ASSESSMENT/PLAN  Need for skilled care:Patient requires the skills of a therapist to provide mod to max  assist to teach diaphragmatic, relaxed breathing to release muscle tension to promote safe voicing of wants and needs during IADLS.    Progress towards goals: Progressing    Barriers to Achieving Goals:  none    Progress towards goals:   Changes to Plan: continue plan of care        Kimberly Lara MS, CCC-SLP

## 2021-09-07 ENCOUNTER — TREATMENT (OUTPATIENT)
Dept: PHYSICAL THERAPY | Facility: CLINIC | Age: 61
End: 2021-09-07

## 2021-09-07 DIAGNOSIS — R49.0 MUSCLE TENSION DYSPHONIA: Primary | ICD-10-CM

## 2021-09-07 PROCEDURE — 92507 TX SP LANG VOICE COMM INDIV: CPT | Performed by: SPEECH-LANGUAGE PATHOLOGIST

## 2021-09-07 NOTE — PROGRESS NOTES
Outpatient Speech Language Pathology   Adult Speech Language Cognitive Treatment Note       Patient Name: Judy Ramey  : 1960  MRN: [id number]  Today's Date: 2021         Visit Date: 2021         Today Visit number: 6  Medical Diagnosis:speech disturbance, unspecified  Treatment Diagnosis:muscle tension dysphonia    ST Subjective  SUBJECTIVE  Patient Comments:  Patient required more education on easy onsets and easy flow phonation.     ST Objective Speech  OBJECTIVE  Patient receiving voice therapy to decrease muscle tension dysphonia leading to a hoarse voice through use of breath support, stretching, vocal cord stretching and easy onsets with forward focus of voice.          ST Speech Therapy  Speech Therapy      GOALS ACTIVITY PERFORMED TRIALS COMPLETED LEVEL OF CUEING REQUIRED    STG 1a: 12 weeks:  Patient will learn techniques to improve vocal hygiene and eliminate abusive vocal behaviors and will demonstrate understanding verbally and through self report of behaviors with minimal cueing.             STG 1b: 12 weeks.  Patient will use appropriate diaphragmatic breathing techniques to support respiration and easy airflow release during conversation with moderate cueing.               Diaphragmatic breathing See 1e for easy airflow release with forward focus techniques at the sentence level. Mod  assist     STG 1C: 12 weeks.  Patient will coordinate proper phonation with proper respiration to increase maximum phonation time to 15 plus seconds.                          STATUS: New               Sustained voicing times 5 repetitions 12 seconds maximum phonation time Mod to max assist to relax vocal tract with use of Kazoo and lip trills.      STG1D: 12 weeks: Patient will be provided with written information on neck/shoulder exercises to increase muscle awareness and relaxation of laryngeal and cervical tension along with demonstration and will demonstrate understanding with min assist.                            STATUS: New             Patient report Patient completed stretching exercises at home       STG1E: 12 weeks:  Patient will engage in exercises to promote increased resonance and release of laryngeal tension with moderate cueing.                             STATUS: New                   Frontal focus technique Forward focus:    /m/ loaded phrases times 20  Chanting and completed again with normal forward focused voice.    Placement of voice :  Initial vowel loaded words times 15  Initial vowel loaded words in sentences times 20  Initial /h/ loaded words times 10  Initial /h/ loaded words in sentences times 10    Mix of initial voicing words in phrases and voiceless words in phrases/sentences using a frontal focused voice.   Reduced to mod  assist for easy onsets with use of flow phonation and frontal placement of voice.         STG 1f: 12weeks:  Patient will increase vocal cord flexibility through use of vocal glides with mod assist                          STATUS: New                  * Patient given a set of respiration and resonance exercises to complete at home.      ASSESSMENT/PLAN  Need for skilled care:Patient requires the skills of a therapist to provide mod to max  assist to teach diaphragmatic, relaxed breathing to release muscle tension to promote safe voicing of wants and needs during IADLS.    Progress towards goals: Progressing    Barriers to Achieving Goals:  none    Progress towards goals:   Changes to Plan: continue plan of care        Kimberly Lara MS, CCC-SLP

## 2021-09-15 ENCOUNTER — OFFICE VISIT (OUTPATIENT)
Dept: FAMILY MEDICINE CLINIC | Facility: CLINIC | Age: 61
End: 2021-09-15

## 2021-09-15 VITALS
BODY MASS INDEX: 34.07 KG/M2 | OXYGEN SATURATION: 96 % | TEMPERATURE: 97.6 F | DIASTOLIC BLOOD PRESSURE: 78 MMHG | SYSTOLIC BLOOD PRESSURE: 124 MMHG | HEART RATE: 64 BPM | WEIGHT: 212 LBS | HEIGHT: 66 IN

## 2021-09-15 DIAGNOSIS — I83.91 VARICOSE VEINS OF RIGHT LOWER EXTREMITY, UNSPECIFIED WHETHER COMPLICATED: Primary | ICD-10-CM

## 2021-09-15 DIAGNOSIS — R09.82 PND (POST-NASAL DRIP): ICD-10-CM

## 2021-09-15 DIAGNOSIS — R05.9 COUGH: ICD-10-CM

## 2021-09-15 DIAGNOSIS — J30.9 ALLERGIC RHINITIS, UNSPECIFIED SEASONALITY, UNSPECIFIED TRIGGER: ICD-10-CM

## 2021-09-15 PROCEDURE — 99213 OFFICE O/P EST LOW 20 MIN: CPT | Performed by: NURSE PRACTITIONER

## 2021-09-15 RX ORDER — AZELASTINE HCL 205.5 UG/1
2 SPRAY NASAL 2 TIMES DAILY
Qty: 10 ML | Refills: 5 | Status: SHIPPED | OUTPATIENT
Start: 2021-09-15 | End: 2021-09-21 | Stop reason: SDUPTHER

## 2021-09-15 NOTE — PROGRESS NOTES
"Chief Complaint  Cough and Leg Pain (pain in right calf area)    Subjective          Judy Ramey presents to [facility]  Presents to the office today with complaints of a persistent cough.  She states has been present for approximately a week.  She denies any fevers, congestion or sore throat.  She states that he does get worse at night when she lays down.  Patient does state that she needs a refill of her Astelin.  He currently does take antihistamines as well as her Singulair.  Did explain to the patient that we would refer her to an allergist for further evaluation due to her still having so much postnasal drip    She also states that she has been having right posterior leg pain.  She denies any swelling of the leg or denies any redness of the leg.      Objective   Vital Signs:   /78   Pulse 64   Temp 97.6 °F (36.4 °C)   Ht 167.6 cm (66\")   Wt 96.2 kg (212 lb)   SpO2 96%   BMI 34.22 kg/m²     Physical Exam  Vitals reviewed.   Constitutional:       Appearance: Normal appearance.   Cardiovascular:      Rate and Rhythm: Normal rate and regular rhythm.      Heart sounds: Normal heart sounds, S1 normal and S2 normal. No murmur heard.     Pulmonary:      Effort: Pulmonary effort is normal. No respiratory distress.      Breath sounds: Normal breath sounds.   Musculoskeletal:      Right lower leg: Tenderness present.      Comments: Varicose vein noted to the posterior lower leg   Skin:     General: Skin is warm and dry.   Neurological:      Mental Status: She is alert and oriented to person, place, and time.   Psychiatric:         Attention and Perception: Attention normal.         Mood and Affect: Mood normal.         Behavior: Behavior normal.        Result Review :                 Assessment and Plan    Diagnoses and all orders for this visit:    1. Varicose veins of right lower extremity, unspecified whether complicated (Primary)    2. Allergic rhinitis, " unspecified seasonality, unspecified trigger  -     azelastine (ASTEPRO) 0.15 % solution nasal spray; 2 sprays into the nostril(s) as directed by provider 2 (Two) Times a Day.  Dispense: 10 mL; Refill: 5  -     Ambulatory Referral to Allergy    3. PND (post-nasal drip)  -     azelastine (ASTEPRO) 0.15 % solution nasal spray; 2 sprays into the nostril(s) as directed by provider 2 (Two) Times a Day.  Dispense: 10 mL; Refill: 5  -     Ambulatory Referral to Allergy    4. Cough  -     Ambulatory Referral to Allergy        Follow Up   Return if symptoms worsen or fail to improve.  Patient was given instructions and counseling regarding her condition or for health maintenance advice. Please see specific information pulled into the AVS if appropriate.

## 2021-09-20 ENCOUNTER — OFFICE VISIT (OUTPATIENT)
Dept: ORTHOPEDIC SURGERY | Facility: CLINIC | Age: 61
End: 2021-09-20

## 2021-09-20 VITALS — WEIGHT: 215.2 LBS | HEART RATE: 79 BPM | HEIGHT: 66 IN | BODY MASS INDEX: 34.58 KG/M2 | OXYGEN SATURATION: 97 %

## 2021-09-20 DIAGNOSIS — M19.011 ARTHRITIS OF RIGHT ACROMIOCLAVICULAR JOINT: ICD-10-CM

## 2021-09-20 DIAGNOSIS — M75.51 BURSITIS OF RIGHT SHOULDER: ICD-10-CM

## 2021-09-20 DIAGNOSIS — M75.41 IMPINGEMENT SYNDROME OF RIGHT SHOULDER: Primary | ICD-10-CM

## 2021-09-20 PROCEDURE — 99203 OFFICE O/P NEW LOW 30 MIN: CPT | Performed by: ORTHOPAEDIC SURGERY

## 2021-09-20 NOTE — PROGRESS NOTES
"Chief Complaint  Pain of the Right Shoulder     Subjective      Judy Ramey presents to [facility] for an evaluation of right shoulder. Patient has had shoulder pain that has been ongoing and progressively gotten worse for 5-6 years with no known injury or trauma. Within the last several months pain got worse. Last visit patient was diagnosed with right shoulder impingement, AC joint arthritis and right shoulder bursitis. She was given an injection last visit that has given her relief. She states she has mild pain but nothing as severe as it was before.     No Known Allergies     Social History     Socioeconomic History   • Marital status:      Spouse name: Not on file   • Number of children: Not on file   • Years of education: Not on file   • Highest education level: Not on file   Tobacco Use   • Smoking status: Former Smoker   • Smokeless tobacco: Never Used   Vaping Use   • Vaping Use: Never used   Substance and Sexual Activity   • Alcohol use: Never   • Drug use: Never   • Sexual activity: Defer        Review of Systems     Objective   Vital Signs:   Pulse 79   Ht 167.6 cm (66\")   Wt 97.6 kg (215 lb 3.2 oz)   SpO2 97%   BMI 34.73 kg/m²       Physical Exam  Constitutional:       Appearance: Normal appearance. Patient is well-developed and normal weight.   HENT:      Head: Normocephalic.      Right Ear: Hearing and external ear normal.      Left Ear: Hearing and external ear normal.      Nose: Nose normal.   Eyes:      Conjunctiva/sclera: Conjunctivae normal.   Cardiovascular:      Rate and Rhythm: Normal rate.   Pulmonary:      Effort: Pulmonary effort is normal.      Breath sounds: No wheezing or rales.   Abdominal:      Palpations: Abdomen is soft.      Tenderness: There is no abdominal tenderness.   Musculoskeletal:      Cervical back: Normal range of motion.   Skin:     Findings: No rash.   Neurological:      Mental Status: Patient  is alert and oriented to " person, place, and time.   Psychiatric:         Mood and Affect: Mood and affect normal.         Judgment: Judgment normal.       Ortho Exam      RIGHT SHOULDER: Full flexion and extension of the elbow. Skin intact. Radial pulse 2+, ulnar pulse 2+. Sensation grossly intact. Neurovascular intact. No swelling, skin discoloration or atrophy. Mild impingement signs. Tender AC joint. Full shoulder range of motion. Full cervical range of motion. No winging of the scapula.       Procedures        Imaging Results (Most Recent)     None           Result Review :       XR Scapula Right    Result Date: 8/25/2021  Narrative: X-Ray Report: Right shoulder(s) X-Ray Indication: Evaluation of right shoulder AP and Lateral view(s) Findings: No acute fracture or dislocation. Mild to moderate degenerative changes are present. Prior studies available for comparison: no              Assessment and Plan     DX: Right shoulder impingement   Right shoulder bursitis   AC joint arthritis, right     Patient is doing well with the previous injection. She will continue activities as tolerated. We discussed a scope in the future if pain worsens again or future injections.      Call or return if worsening symptoms.    Follow Up     PRN.       Patient was given instructions and counseling regarding her condition or for health maintenance advice. Please see specific information pulled into the AVS if appropriate.     Scribed for Brandon De La Torre MD by Rebekah Sen.  09/20/21   09:38 EDT    I have personally performed the services described in this document as scribed by the above individual and it is both accurate and complete. Brandon De La Torre MD 09/20/21

## 2021-09-21 ENCOUNTER — TREATMENT (OUTPATIENT)
Dept: PHYSICAL THERAPY | Facility: CLINIC | Age: 61
End: 2021-09-21

## 2021-09-21 DIAGNOSIS — J30.9 ALLERGIC RHINITIS, UNSPECIFIED SEASONALITY, UNSPECIFIED TRIGGER: ICD-10-CM

## 2021-09-21 DIAGNOSIS — R49.0 MUSCLE TENSION DYSPHONIA: Primary | ICD-10-CM

## 2021-09-21 DIAGNOSIS — R09.82 PND (POST-NASAL DRIP): ICD-10-CM

## 2021-09-21 PROCEDURE — 92507 TX SP LANG VOICE COMM INDIV: CPT | Performed by: SPEECH-LANGUAGE PATHOLOGIST

## 2021-09-21 RX ORDER — AZELASTINE HCL 205.5 UG/1
2 SPRAY NASAL 2 TIMES DAILY
Qty: 10 ML | Refills: 5 | Status: SHIPPED | OUTPATIENT
Start: 2021-09-21 | End: 2021-10-28 | Stop reason: SDUPTHER

## 2021-09-21 NOTE — TELEPHONE ENCOUNTER
Caller: Judy Ramey    Relationship: Self      Medication requested (name and dosage):   STADAHLIA AD  (SAMPLES WERE GIVEN)    azelastine (ASTEPRO) 0.15 % solution nasal spray      Pharmacy where request should be sent: [facility] [address] [phone] [facility] [phone]      Additional details provided by patient: [facility] DOES NOT CARRY THE ABOVE MEDICATIONS, PATIENT REQUESTS THEY BE SENT TO [facility]    Best call back number: [phone]     Does the patient have less than a 3 day supply:  [] Yes  [x] No    Tatyana Cannon Rep   09/21/21 14:16 EDT

## 2021-09-21 NOTE — PROGRESS NOTES
Outpatient Speech Language Pathology   Adult Speech Language Cognitive Treatment Note       Patient Name: Judy Ramey  : 1960  MRN: [id number]  Today's Date: 2021         Visit Date: 2021         Today Visit number: 6  Medical Diagnosis:speech disturbance, unspecified  Treatment Diagnosis:muscle tension dysphonia    ST Subjective  SUBJECTIVE  Patient Comments:  Patient indicates she notices a difference in her voicing since the beginning of vocal therapy.       ST Objective Speech  OBJECTIVE  Patient receiving voice therapy to decrease muscle tension dysphonia leading to a hoarse voice through use of breath support, stretching, vocal cord stretching and easy onsets with forward focus of voice.          ST Speech Therapy  Speech Therapy      GOALS ACTIVITY PERFORMED TRIALS COMPLETED LEVEL OF CUEING REQUIRED    STG 1a: 12 weeks:  Patient will learn techniques to improve vocal hygiene and eliminate abusive vocal behaviors and will demonstrate understanding verbally and through self report of behaviors with minimal cueing.             STG 1b: 12 weeks.  Patient will use appropriate diaphragmatic breathing techniques to support respiration and easy airflow release during conversation with moderate cueing.               Diaphragmatic breathing Inhalation 5, exhale on me for 10 seconds       Easy onsets reading sentences of increasing length times 10 sentences    Easy onsets reading at the paragraph level    -easy onsets during speech Mod  assist     STG 1C: 12 weeks.  Patient will coordinate proper phonation with proper respiration to increase maximum phonation time to 15 plus seconds.                          STATUS: New               Sustained voicing 8 seconds maximum phonation time    Voicing on middle C, D, E, F, and G times 2 repetitions on each Mod to max assist to relax vocal tract with use of lip trills with voicing and relaxation of soft palate exercises     STG1D: 12 weeks: Patient  will be provided with written information on neck/shoulder exercises to increase muscle awareness and relaxation of laryngeal and cervical tension along with demonstration and will demonstrate understanding with min assist.                           STATUS: New             Patient report Patient completed stretching exercises at home  No assist     STG1E: 12 weeks:  Patient will engage in exercises to promote increased resonance and release of laryngeal tension with moderate cueing.                             STATUS: New                   Frontal focus technique Forward focus:    Sustained phonation on /m/    Sustained phonation times 5 on me    Chanting initial /m/ words in sentences    Easy forward focus reading sentences on increasing length    mod  assist for easy onsets with use of flow phonation and frontal placement of voice.         STG 1f: 12weeks:  Patient will increase vocal cord flexibility through use of vocal glides with mod assist                          STATUS: New           Vocal cord flexibilty Vocal glides on knoll ascending and descending scales times 2 Max assist     * Patient given a set of respiration and resonance exercises to complete at home.      ASSESSMENT/PLAN  Need for skilled care:Patient requires the skills of a therapist to provide mod to max  assist to teach diaphragmatic, relaxed breathing to release muscle tension to promote safe voicing of wants and needs during IADLS.    Progress towards goals: Progressing    Barriers to Achieving Goals:  none    Progress towards goals:   Changes to Plan: continue plan of care        Kimberly Lara MS, CCC-SLP

## 2021-10-13 ENCOUNTER — TELEPHONE (OUTPATIENT)
Dept: FAMILY MEDICINE CLINIC | Facility: CLINIC | Age: 61
End: 2021-10-13

## 2021-10-13 DIAGNOSIS — M25.561 ACUTE PAIN OF RIGHT KNEE: Primary | ICD-10-CM

## 2021-10-13 NOTE — TELEPHONE ENCOUNTER
Judy would like a referral put in to see Dr. De La Torre for her R knee. She has already had an MRI. She is already an established patient with Dr. De La Torre for her shoulder.

## 2021-10-20 ENCOUNTER — OFFICE VISIT (OUTPATIENT)
Dept: GASTROENTEROLOGY | Facility: CLINIC | Age: 61
End: 2021-10-20

## 2021-10-20 VITALS
HEIGHT: 66 IN | WEIGHT: 210.8 LBS | SYSTOLIC BLOOD PRESSURE: 133 MMHG | BODY MASS INDEX: 33.88 KG/M2 | HEART RATE: 61 BPM | DIASTOLIC BLOOD PRESSURE: 58 MMHG

## 2021-10-20 DIAGNOSIS — K21.9 GASTROESOPHAGEAL REFLUX DISEASE, UNSPECIFIED WHETHER ESOPHAGITIS PRESENT: Primary | ICD-10-CM

## 2021-10-20 DIAGNOSIS — R49.0 DYSPHONIA: ICD-10-CM

## 2021-10-20 PROBLEM — G89.29 CHRONIC BACK PAIN: Status: ACTIVE | Noted: 2021-10-20

## 2021-10-20 PROBLEM — R06.89 DIFFICULTY BREATHING: Status: ACTIVE | Noted: 2021-10-20

## 2021-10-20 PROBLEM — M54.9 CHRONIC BACK PAIN: Status: ACTIVE | Noted: 2021-10-20

## 2021-10-20 PROBLEM — R05.9 COUGH: Status: ACTIVE | Noted: 2021-10-20

## 2021-10-20 PROBLEM — G47.30 SLEEP APNEA: Status: ACTIVE | Noted: 2021-10-20

## 2021-10-20 PROCEDURE — 99214 OFFICE O/P EST MOD 30 MIN: CPT | Performed by: NURSE PRACTITIONER

## 2021-10-20 RX ORDER — FAMOTIDINE 20 MG/1
20 TABLET, FILM COATED ORAL NIGHTLY PRN
Qty: 90 TABLET | Refills: 1 | Status: SHIPPED | OUTPATIENT
Start: 2021-10-20 | End: 2022-05-26

## 2021-10-20 RX ORDER — PANTOPRAZOLE SODIUM 40 MG/1
40 TABLET, DELAYED RELEASE ORAL DAILY
Qty: 90 TABLET | Refills: 1 | Status: SHIPPED | OUTPATIENT
Start: 2021-10-20 | End: 2022-01-18

## 2021-10-20 NOTE — PATIENT INSTRUCTIONS

## 2021-10-20 NOTE — PROGRESS NOTES
Chief Complaint  Heartburn (GERD)    Judy Ramey is a 61 y.o. female who presents to [facility] on referral from FERNY Martin for a gastroenterology evaluation of GERD.  History of Present Illness  She reports reflux that's been present for several years.  States that it's been worsening over the last few months.  PCP increased omeprazole from 40 mg daily to 80 mg daily.  States that she hasn't noticed much improvement since increasing dose.   Reflux is worse at night and she will notice coughing.  Has also noticed some coughing when eating.  Denies dysphagia.      Reports a daily bowel movement.  Denies hematochezia and melena.      Previous colonoscopy January, 2020 per Dr. Higgins.  Diverticulosis.  No colon polyps.         Past Medical History:   Diagnosis Date   • Allergic    • Anxiety    • Asthma    • Change in voice    • Cough    • Dry mouth    • GERD (gastroesophageal reflux disease) 08/28/2019   • History of tobacco use    • Hoarseness of voice    • Hypothyroidism 08/28/2019   • Laryngeal candidiasis    • Low back pain        Past Surgical History:   Procedure Laterality Date   • COLONOSCOPY     • OTHER SURGICAL HISTORY      uterine ablation   • UPPER GASTROINTESTINAL ENDOSCOPY           Current Outpatient Medications:   •  Alcohol Swabs (Easy Touch Alcohol Prep Medium) 70 % pads, , Disp: , Rfl:   •  azelastine (ASTEPRO) 0.15 % solution nasal spray, 2 sprays into the nostril(s) as directed by provider 2 (Two) Times a Day., Disp: 10 mL, Rfl: 5  •  Calcium Carbonate-Vitamin D (calcium-vitamin D) 500-200 MG-UNIT tablet per tablet, , Disp: , Rfl:   •  diclofenac (VOLTAREN) 75 MG EC tablet, , Disp: , Rfl:   •  Dupixent 200 MG/1.14ML solution prefilled syringe, , Disp: , Rfl:   •  escitalopram (LEXAPRO) 20 MG tablet, Take 1 tablet by mouth Daily., Disp: 90 tablet, Rfl: 1  •  estradiol (CLIMARA) 0.05 MG/24HR patch, estradiol 0.05 mg/24 hr transdermal patch weekly  apply 1 patch by transdermal twice a week   Active, Disp: , Rfl:   •  ipratropium-albuterol (DUO-NEB) 0.5-2.5 mg/3 ml nebulizer, , Disp: , Rfl:   •  levothyroxine (SYNTHROID, LEVOTHROID) 112 MCG tablet, levothyroxine 112 mcg oral tablet take 1 tablet (112 mcg) by oral route once daily   Suspended, Disp: , Rfl:   •  loratadine (CLARITIN) 10 MG tablet, , Disp: , Rfl:   •  montelukast (SINGULAIR) 10 MG tablet, montelukast 10 mg oral tablet take 1 tablet (10 mg) by oral route once daily in the evening   Suspended, Disp: , Rfl:   •  traMADol (ULTRAM) 50 MG tablet, , Disp: , Rfl:   •  famotidine (PEPCID) 20 MG tablet, Take 1 tablet by mouth At Night As Needed for Heartburn., Disp: 90 tablet, Rfl: 1  •  pantoprazole (Protonix) 40 MG EC tablet, Take 1 tablet by mouth Daily for 90 days., Disp: 90 tablet, Rfl: 1     No Known Allergies    Family History   Problem Relation Age of Onset   • Arthritis Mother    • Heart disease Mother    • Diabetes Mother    • Arthritis Father    • Stroke Father    • Heart disease Father    • Diabetes Father    • Cancer Sister         Social History     Social History Narrative    Lives with spouse       Immunization:  Immunization History   Administered Date(s) Administered   • COVID-19 (PFIZER) 03/26/2021   • Flu Vaccine Quad PF >18YRS 09/30/2020   • Flu Vaccine Quad PF >36MO 10/28/2019   • Influenza, Unspecified 09/30/2020        Objective     Review of Systems   Constitutional: Negative for fever and unexpected weight loss.   Eyes: Negative for blurred vision and double vision.   Respiratory: Negative for cough and shortness of breath.    Cardiovascular: Negative for chest pain and palpitations.   Gastrointestinal:        See HPI   Genitourinary: Negative for dysuria and hematuria.   Musculoskeletal: Negative for gait problem and joint swelling.   Skin: Negative for rash and skin lesions.   Neurological: Negative for seizures, weakness, numbness and confusion.   Psychiatric/Behavioral:  "Negative for sleep disturbance and depressed mood.        Vital Signs:   /58 (BP Location: Left arm, Patient Position: Sitting, Cuff Size: Adult)   Pulse 61   Ht 167.6 cm (66\")   Wt 95.6 kg (210 lb 12.8 oz)   BMI 34.02 kg/m²       Physical Exam  Constitutional:       General: She is not in acute distress.     Appearance: Normal appearance. She is well-developed and normal weight.   HENT:      Head: Normocephalic and atraumatic.   Eyes:      Conjunctiva/sclera: Conjunctivae normal.      Pupils: Pupils are equal, round, and reactive to light.      Visual Fields: Right eye visual fields normal and left eye visual fields normal.   Cardiovascular:      Rate and Rhythm: Normal rate and regular rhythm.      Heart sounds: Normal heart sounds.   Pulmonary:      Effort: Pulmonary effort is normal. No retractions.      Breath sounds: Normal breath sounds and air entry.   Abdominal:      General: Bowel sounds are normal.      Palpations: Abdomen is soft.      Tenderness: There is no abdominal tenderness.      Comments: No appreciable hepatosplenomegaly or ascites   Musculoskeletal:         General: Normal range of motion.      Cervical back: Neck supple.      Right lower leg: No edema.      Left lower leg: No edema.   Lymphadenopathy:      Cervical: No cervical adenopathy.   Skin:     General: Skin is warm and dry.      Findings: No lesion.   Neurological:      General: No focal deficit present.      Mental Status: She is alert and oriented to person, place, and time.   Psychiatric:         Mood and Affect: Mood and affect normal.         Behavior: Behavior normal.         Result Review :                 Assessment and Plan    Diagnoses and all orders for this visit:    1. Gastroesophageal reflux disease, unspecified whether esophagitis present (Primary)  -     pantoprazole (Protonix) 40 MG EC tablet; Take 1 tablet by mouth Daily for 90 days.  Dispense: 90 tablet; Refill: 1  -     famotidine (PEPCID) 20 MG tablet; " Take 1 tablet by mouth At Night As Needed for Heartburn.  Dispense: 90 tablet; Refill: 1  -     Case Request; Standing  -     Case Request    2. Dysphonia  -     Case Request; Standing  -     Case Request    Other orders  -     Follow Anesthesia Guidelines / Protocol; Future  -     Obtain Informed Consent; Future        ESOPHAGOGASTRODUODENOSCOPY (N/A)        Follow Up   Return for F/U after procedure.  Patient was given instructions and counseling regarding her condition or for health maintenance advice. Please see specific information pulled into the AVS if appropriate.

## 2021-10-25 DIAGNOSIS — J45.50 SEVERE PERSISTENT ASTHMA WITHOUT COMPLICATION: Primary | ICD-10-CM

## 2021-10-25 RX ORDER — DUPILUMAB 200 MG/1.14ML
200 INJECTION, SOLUTION SUBCUTANEOUS
Qty: 2.28 ML | Refills: 11 | Status: SHIPPED | OUTPATIENT
Start: 2021-10-25 | End: 2021-12-09

## 2021-10-28 DIAGNOSIS — J30.9 ALLERGIC RHINITIS, UNSPECIFIED SEASONALITY, UNSPECIFIED TRIGGER: ICD-10-CM

## 2021-10-28 DIAGNOSIS — R09.82 PND (POST-NASAL DRIP): ICD-10-CM

## 2021-10-28 RX ORDER — AZELASTINE HCL 205.5 UG/1
2 SPRAY NASAL 2 TIMES DAILY
Qty: 10 ML | Refills: 5 | Status: SHIPPED | OUTPATIENT
Start: 2021-10-28 | End: 2021-11-01 | Stop reason: ALTCHOICE

## 2021-10-28 NOTE — TELEPHONE ENCOUNTER
Caller: Judy Ramey    Relationship: Self    Best call back number:   [phone]    What is the medical concern/diagnosis: RIGHT KNEE    What specialty or service is being requested: ORTHO    What is the provider, practice or medical service name: DR BEEN    Any additional details: PATIENT IS FOLLOWING UP ON THIS REQUEST

## 2021-11-01 ENCOUNTER — OFFICE VISIT (OUTPATIENT)
Dept: FAMILY MEDICINE CLINIC | Facility: CLINIC | Age: 61
End: 2021-11-01

## 2021-11-01 VITALS
BODY MASS INDEX: 34.33 KG/M2 | WEIGHT: 213.6 LBS | HEART RATE: 73 BPM | DIASTOLIC BLOOD PRESSURE: 70 MMHG | OXYGEN SATURATION: 97 % | HEIGHT: 66 IN | SYSTOLIC BLOOD PRESSURE: 124 MMHG | TEMPERATURE: 97.4 F

## 2021-11-01 DIAGNOSIS — J30.9 ALLERGIC RHINITIS, UNSPECIFIED SEASONALITY, UNSPECIFIED TRIGGER: Primary | ICD-10-CM

## 2021-11-01 DIAGNOSIS — N39.3 STRESS INCONTINENCE: ICD-10-CM

## 2021-11-01 DIAGNOSIS — Z23 NEED FOR SHINGLES VACCINE: ICD-10-CM

## 2021-11-01 DIAGNOSIS — G89.29 CHRONIC PAIN OF LEFT KNEE: ICD-10-CM

## 2021-11-01 DIAGNOSIS — M25.562 CHRONIC PAIN OF LEFT KNEE: ICD-10-CM

## 2021-11-01 DIAGNOSIS — M25.469 KNEE SWELLING: ICD-10-CM

## 2021-11-01 PROBLEM — J45.909 ASTHMA: Status: ACTIVE | Noted: 2021-11-01

## 2021-11-01 PROBLEM — M54.50 LOW BACK PAIN: Status: ACTIVE | Noted: 2021-11-01

## 2021-11-01 PROBLEM — R32 URINARY INCONTINENCE: Status: ACTIVE | Noted: 2021-11-01

## 2021-11-01 PROBLEM — R39.15 URINARY URGENCY: Status: ACTIVE | Noted: 2021-11-01

## 2021-11-01 PROBLEM — M50.90 DISORDER OF INTERVERTEBRAL DISC OF CERVICAL SPINE: Status: ACTIVE | Noted: 2021-11-01

## 2021-11-01 PROBLEM — R49.0 HOARSENESS OF VOICE: Status: ACTIVE | Noted: 2021-11-01

## 2021-11-01 PROBLEM — M48.02 CERVICAL STENOSIS OF SPINE: Status: ACTIVE | Noted: 2019-04-17

## 2021-11-01 PROBLEM — R68.2 DRY MOUTH: Status: ACTIVE | Noted: 2021-11-01

## 2021-11-01 PROBLEM — R49.9 CHANGE IN VOICE: Status: ACTIVE | Noted: 2021-11-01

## 2021-11-01 PROBLEM — E03.9 HYPOTHYROIDISM: Status: ACTIVE | Noted: 2019-08-28

## 2021-11-01 PROBLEM — M25.569 KNEE PAIN: Status: ACTIVE | Noted: 2021-11-01

## 2021-11-01 PROBLEM — N94.9 FEMALE GENITAL SYMPTOMS: Status: ACTIVE | Noted: 2021-11-01

## 2021-11-01 PROBLEM — J34.3 HYPERTROPHY OF NASAL TURBINATES: Status: ACTIVE | Noted: 2021-11-01

## 2021-11-01 PROBLEM — K21.9 GASTROESOPHAGEAL REFLUX DISEASE: Status: ACTIVE | Noted: 2021-11-01

## 2021-11-01 PROBLEM — B37.89 LARYNGEAL CANDIDIASIS: Status: ACTIVE | Noted: 2021-11-01

## 2021-11-01 PROCEDURE — 99214 OFFICE O/P EST MOD 30 MIN: CPT | Performed by: NURSE PRACTITIONER

## 2021-11-01 RX ORDER — IBUPROFEN 200 MG
TABLET ORAL
COMMUNITY
Start: 2021-10-29 | End: 2022-11-02

## 2021-11-01 RX ORDER — ZOSTER VACCINE RECOMBINANT, ADJUVANTED 50 MCG/0.5
0.5 KIT INTRAMUSCULAR ONCE
Qty: 1 EACH | Refills: 0 | Status: SHIPPED | OUTPATIENT
Start: 2021-11-01 | End: 2021-11-01

## 2021-11-01 RX ORDER — AZELASTINE HYDROCHLORIDE, FLUTICASONE PROPIONATE 137; 50 UG/1; UG/1
1 SPRAY, METERED NASAL 2 TIMES DAILY
Qty: 23 G | Refills: 2 | Status: SHIPPED | OUTPATIENT
Start: 2021-11-01 | End: 2021-12-09 | Stop reason: SDUPTHER

## 2021-11-01 RX ORDER — SODIUM FLUORIDE 5 MG/ML
PASTE, DENTIFRICE DENTAL
COMMUNITY
Start: 2021-10-21

## 2021-11-01 RX ORDER — ESTRADIOL 0.05 MG/D
1 FILM, EXTENDED RELEASE TRANSDERMAL WEEKLY
COMMUNITY
Start: 2021-10-29 | End: 2023-01-17 | Stop reason: SDUPTHER

## 2021-11-01 RX ORDER — TOLTERODINE TARTRATE 2 MG/1
2 TABLET, EXTENDED RELEASE ORAL 2 TIMES DAILY
COMMUNITY
End: 2021-11-01 | Stop reason: SDUPTHER

## 2021-11-01 RX ORDER — LEVOTHYROXINE SODIUM 125 MCG
125 TABLET ORAL DAILY
COMMUNITY
Start: 2021-10-29 | End: 2022-02-07 | Stop reason: SDUPTHER

## 2021-11-01 RX ORDER — TOLTERODINE TARTRATE 2 MG/1
2 TABLET, EXTENDED RELEASE ORAL 2 TIMES DAILY
Qty: 180 TABLET | Refills: 1 | Status: SHIPPED | OUTPATIENT
Start: 2021-11-01 | End: 2022-04-18 | Stop reason: SDUPTHER

## 2021-11-01 NOTE — PROGRESS NOTES
"Chief Complaint  Knee Pain (bilateral)    Subjective          Judy Ramey presents to [facility]  Patient presents to the office today requesting an MRI of her left knee.  She states that her knee pain is getting more severe.  She states that her knee stay swollen.  She did have an MRI of her right knee which showed a meniscus tear.  Patient states that she would like another referral to orthopedic after she has the MRI completed  Patient also states that she like to change her Astelin to the Dymista as it works better for her.  She is also needing a refill of her continence medication.  She also requests a prescription for shingles vaccination.      Objective   Vital Signs:   /70 (BP Location: Right arm, Patient Position: Sitting, Cuff Size: Adult)   Pulse 73   Temp 97.4 °F (36.3 °C) (Temporal)   Ht 167.6 cm (66\")   Wt 96.9 kg (213 lb 9.6 oz)   SpO2 97%   BMI 34.48 kg/m²     Physical Exam  Vitals reviewed.   Constitutional:       Appearance: Normal appearance.   Cardiovascular:      Rate and Rhythm: Normal rate and regular rhythm.      Heart sounds: Normal heart sounds, S1 normal and S2 normal. No murmur heard.      Pulmonary:      Effort: Pulmonary effort is normal. No respiratory distress.      Breath sounds: Normal breath sounds.   Musculoskeletal:      Right knee: Swelling present. Tenderness present.      Left knee: Swelling present. Tenderness present.   Skin:     General: Skin is warm and dry.   Neurological:      Mental Status: She is alert and oriented to person, place, and time.   Psychiatric:         Attention and Perception: Attention normal.         Mood and Affect: Mood normal.         Behavior: Behavior normal.        Result Review :                Assessment and Plan    Diagnoses and all orders for this visit:    1. Allergic rhinitis, unspecified seasonality, unspecified trigger (Primary)  -     Azelastine-Fluticasone (Dymista) 137-50 MCG/ACT " suspension; 1 spray into the nostril(s) as directed by provider 2 (Two) Times a Day.  Dispense: 23 g; Refill: 2    2. Chronic pain of left knee  -     MRI Knee Left Without Contrast; Future    3. Knee swelling  -     MRI Knee Left Without Contrast; Future    4. Stress incontinence  -     tolterodine (DETROL) 2 MG tablet; Take 1 tablet by mouth 2 (Two) Times a Day.  Dispense: 180 tablet; Refill: 1    5. Need for shingles vaccine  -     Zoster Vac Recomb Adjuvanted (Shingrix) 50 MCG/0.5ML reconstituted suspension; Inject 0.5 mL into the appropriate muscle as directed by prescriber 1 (One) Time for 1 dose.  Dispense: 1 each; Refill: 0        Follow Up   Return if symptoms worsen or fail to improve.  Patient was given instructions and counseling regarding her condition or for health maintenance advice. Please see specific information pulled into the AVS if appropriate.

## 2021-11-19 ENCOUNTER — HOSPITAL ENCOUNTER (OUTPATIENT)
Dept: MRI IMAGING | Facility: HOSPITAL | Age: 61
Discharge: HOME | End: 2021-11-19
Admitting: NURSE PRACTITIONER

## 2021-11-19 DIAGNOSIS — M25.469 KNEE SWELLING: ICD-10-CM

## 2021-11-19 DIAGNOSIS — M25.562 CHRONIC PAIN OF LEFT KNEE: ICD-10-CM

## 2021-11-19 DIAGNOSIS — G89.29 CHRONIC PAIN OF LEFT KNEE: ICD-10-CM

## 2021-11-19 PROCEDURE — 73721 MRI JNT OF LWR EXTRE W/O DYE: CPT

## 2021-11-22 ENCOUNTER — TELEPHONE (OUTPATIENT)
Dept: FAMILY MEDICINE CLINIC | Facility: CLINIC | Age: 61
End: 2021-11-22

## 2021-11-22 DIAGNOSIS — M25.562 ACUTE PAIN OF BOTH KNEES: Primary | ICD-10-CM

## 2021-11-22 DIAGNOSIS — M25.561 ACUTE PAIN OF BOTH KNEES: Primary | ICD-10-CM

## 2021-11-22 NOTE — TELEPHONE ENCOUNTER
----- Message from FERNY Martin sent at 11/19/2021  5:10 PM EST -----  Nondisplaced subchondral insufficiency fracture noted.  Please refer to orthopedics for further eval

## 2021-12-06 ENCOUNTER — DOCUMENTATION (OUTPATIENT)
Dept: PHYSICAL THERAPY | Facility: CLINIC | Age: 61
End: 2021-12-06

## 2021-12-06 ENCOUNTER — TELEPHONE (OUTPATIENT)
Dept: PULMONOLOGY | Facility: CLINIC | Age: 61
End: 2021-12-06

## 2021-12-06 DIAGNOSIS — R49.0 MUSCLE TENSION DYSPHONIA: Primary | ICD-10-CM

## 2021-12-06 NOTE — TELEPHONE ENCOUNTER
I called pt and advised her to stop taking her medication. Pt stated she has an eye appointment with her a doctor on the 22nd of this month. Pt wanted to be seen by us earlier. We have scheduled her an appointment with Jacque this Thursday 12/02/2021 @ 10:00. Pt was informed to get OTC lubricating eye drops. Pt has been informed of appointment this Thursday with Jacque.

## 2021-12-06 NOTE — PROGRESS NOTES
Speech Discharge Statement        Outpatient Speech Language Pathology   Adult Voice Therapy Discharge     Patient Name: Judy Ramey  : 1960  MRN: [id number]  Today's Date: 2021         Visit Date: 2021        Visit Dx:  Muscle Tension Dysphonia  Patient's History:Patient attending speech therapy for dysphonia    Reason for Discharge:unexpected discharge, patient called and cancelled last appointment      Discharge Instructions:   None secondary to unexpected discharte      Functional Communication Measures: No functional testing completed secondary to unexpected discharge      Assessment:    n/a      Speech Therapy Goals:  Speech Goals  PROBLEMS:  1. Vocal limitation of 40-59% FCM4/7     LTG 1: 12 weeks:  Patient will increase Vocal Functional Measure to 6/7 decreasing vocal limitation to 1-19% for improved phonation and reduced supraglottic adduction by decreasing hyperfunctional behaviors and improving overall hygiene to facilitate proper voicing required for safe communication of wants and needs during ADLS.                           STATUS:  Not met              STG 1a: 12 weeks:  Patient will learn techniques to improve vocal hygiene and eliminate abusive vocal behaviors and will demonstrate understanding verbally and through self report of behaviors with minimal cueing.                           STATUS:  Not met              STG 1b: 12 weeks.  Patient will use appropriate diaphragmatic breathing techniques to support respiration and easy airflow release during conversation with moderate cueing.                            STATUS: Not met              STG 1C: 12 weeks.  Patient will coordinate proper phonation with proper respiration to increase maximum phonation time to 15 plus seconds.                          STATUS: Not met              STG1D: 12 weeks: Patient will be provided with written information on neck/shoulder exercises to increase muscle awareness and relaxation  of laryngeal and cervical tension along with demonstration and will demonstrate understanding with min assist.                           STATUS: Not met              STG1E: 12 weeks:  Patient will engage in exercises to promote increased resonance and release of laryngeal tension with moderate cueing.                             STATUS: Not met              STG 1f: 12weeks:  Patient will increase vocal cord flexibility through use of vocal glides with mod assist                          STATUS: Not met              TREATMENT: Speech Therapy to release tension and improve vocal hygiene to facilitate proper voicing for safe communication of wants and needs during ADLS      Recommendations: Discharge             ST Plan,DC  PLAN

## 2021-12-06 NOTE — TELEPHONE ENCOUNTER
PT CALLED AND SAID THE DUPIXENT SHE WAS RX'ED IS BLURRING HER VISION AND SHE DOESN'T KNOW IF SHE SHOULD KEEP TAKING IT, PLEASE CALL PT BACK

## 2021-12-09 ENCOUNTER — OFFICE VISIT (OUTPATIENT)
Dept: PULMONOLOGY | Facility: CLINIC | Age: 61
End: 2021-12-09

## 2021-12-09 VITALS
OXYGEN SATURATION: 98 % | WEIGHT: 210 LBS | SYSTOLIC BLOOD PRESSURE: 125 MMHG | DIASTOLIC BLOOD PRESSURE: 54 MMHG | HEIGHT: 66 IN | HEART RATE: 67 BPM | BODY MASS INDEX: 33.75 KG/M2 | RESPIRATION RATE: 14 BRPM

## 2021-12-09 DIAGNOSIS — J33.9 NASAL POLYPS: ICD-10-CM

## 2021-12-09 DIAGNOSIS — R76.8 ELEVATED IGE LEVEL: ICD-10-CM

## 2021-12-09 DIAGNOSIS — R06.00 DYSPNEA, UNSPECIFIED TYPE: ICD-10-CM

## 2021-12-09 DIAGNOSIS — J45.50 SEVERE PERSISTENT ASTHMA, UNSPECIFIED WHETHER COMPLICATED: Primary | ICD-10-CM

## 2021-12-09 DIAGNOSIS — F17.201 TOBACCO ABUSE, IN REMISSION: ICD-10-CM

## 2021-12-09 DIAGNOSIS — J30.9 ALLERGIC RHINITIS, UNSPECIFIED SEASONALITY, UNSPECIFIED TRIGGER: ICD-10-CM

## 2021-12-09 DIAGNOSIS — J30.2 SEASONAL ALLERGIES: ICD-10-CM

## 2021-12-09 PROCEDURE — 99214 OFFICE O/P EST MOD 30 MIN: CPT | Performed by: NURSE PRACTITIONER

## 2021-12-09 RX ORDER — AZELASTINE HYDROCHLORIDE AND FLUTICASONE PROPIONATE 137; 50 UG/1; UG/1
1 SPRAY, METERED NASAL 2 TIMES DAILY
Qty: 69 G | Refills: 3 | Status: SHIPPED | OUTPATIENT
Start: 2021-12-09 | End: 2022-03-09

## 2021-12-09 RX ORDER — PREDNISONE 20 MG/1
40 TABLET ORAL DAILY
Qty: 14 TABLET | Refills: 0 | Status: SHIPPED | OUTPATIENT
Start: 2021-12-09 | End: 2022-01-10

## 2021-12-09 NOTE — PATIENT INSTRUCTIONS
Asthma, Adult    Asthma is a long-term (chronic) condition in which the airways get tight and narrow. The airways are the breathing passages that lead from the nose and mouth down into the lungs. A person with asthma will have times when symptoms get worse. These are called asthma attacks. They can cause coughing, whistling sounds when you breathe (wheezing), shortness of breath, and chest pain. They can make it hard to breathe. There is no cure for asthma, but medicines and lifestyle changes can help control it.  There are many things that can bring on an asthma attack or make asthma symptoms worse (triggers). Common triggers include:  · Mold.  · Dust.  · Cigarette smoke.  · Cockroaches.  · Things that can cause allergy symptoms (allergens). These include animal skin flakes (dander) and pollen from trees or grass.  · Things that pollute the air. These may include household , wood smoke, smog, or chemical odors.  · Cold air, weather changes, and wind.  · Crying or laughing hard.  · Stress.  · Certain medicines or drugs.  · Certain foods such as dried fruit, potato chips, and grape juice.  · Infections, such as a cold or the flu.  · Certain medical conditions or diseases.  · Exercise or tiring activities.  Asthma may be treated with medicines and by staying away from the things that cause asthma attacks. Types of medicines may include:  · Controller medicines. These help prevent asthma symptoms. They are usually taken every day.  · Fast-acting reliever or rescue medicines. These quickly relieve asthma symptoms. They are used as needed and provide short-term relief.  · Allergy medicines if your attacks are brought on by allergens.  · Medicines to help control the body's defense (immune) system.  Follow these instructions at home:  Avoiding triggers in your home  · Change your heating and air conditioning filter often.  · Limit your use of fireplaces and wood stoves.  · Get rid of pests (such as roaches and  mice) and their droppings.  · Throw away plants if you see mold on them.  · Clean your floors. Dust regularly. Use cleaning products that do not smell.  · Have someone vacuum when you are not home. Use a vacuum  with a HEPA filter if possible.  · Replace carpet with wood, tile, or vinyl kj. Carpet can trap animal skin flakes and dust.  · Use allergy-proof pillows, mattress covers, and box spring covers.  · Wash bed sheets and blankets every week in hot water. Dry them in a dryer.  · Keep your bedroom free of any triggers.  · Avoid pets and keep windows closed when things that cause allergy symptoms are in the air.  · Use blankets that are made of polyester or cotton.  · Clean bathrooms and robb with bleach. If possible, have someone repaint the walls in these rooms with mold-resistant paint. Keep out of the rooms that are being cleaned and painted.  · Wash your hands often with soap and water. If soap and water are not available, use hand .  · Do not allow anyone to smoke in your home.  General instructions  · Take over-the-counter and prescription medicines only as told by your doctor.  ? Talk with your doctor if you have questions about how or when to take your medicines.  ? Make note if you need to use your medicines more often than usual.  · Do not use any products that contain nicotine or tobacco, such as cigarettes and e-cigarettes. If you need help quitting, ask your doctor.  · Stay away from secondhand smoke.  · Avoid doing things outdoors when allergen counts are high and when air quality is low.  · Wear a ski mask when doing outdoor activities in the winter. The mask should cover your nose and mouth. Exercise indoors on cold days if you can.  · Warm up before you exercise. Take time to cool down after exercise.  · Use a peak flow meter as told by your doctor. A peak flow meter is a tool that measures how well the lungs are working.  · Keep track of the peak flow meter's readings.  Write them down.  · Follow your asthma action plan. This is a written plan for taking care of your asthma and treating your attacks.  · Make sure you get all the shots (vaccines) that your doctor recommends. Ask your doctor about a flu shot and a pneumonia shot.  · Keep all follow-up visits as told by your doctor. This is important.  Contact a doctor if:  · You have wheezing, shortness of breath, or a cough even while taking medicine to prevent attacks.  · The mucus you cough up (sputum) is thicker than usual.  · The mucus you cough up changes from clear or white to yellow, green, gray, or bloody.  · You have problems from the medicine you are taking, such as:  ? A rash.  ? Itching.  ? Swelling.  ? Trouble breathing.  · You need reliever medicines more than 2-3 times a week.  · Your peak flow reading is still at 50-79% of your personal best after following the action plan for 1 hour.  · You have a fever.  Get help right away if:  · You seem to be worse and are not responding to medicine during an asthma attack.  · You are short of breath even at rest.  · You get short of breath when doing very little activity.  · You have trouble eating, drinking, or talking.  · You have chest pain or tightness.  · You have a fast heartbeat.  · Your lips or fingernails start to turn blue.  · You are light-headed or dizzy, or you faint.  · Your peak flow is less than 50% of your personal best.  · You feel too tired to breathe normally.  Summary  · Asthma is a long-term (chronic) condition in which the airways get tight and narrow. An asthma attack can make it hard to breathe.  · Asthma cannot be cured, but medicines and lifestyle changes can help control it.  · Make sure you understand how to avoid triggers and how and when to use your medicines.  This information is not intended to replace advice given to you by your health care provider. Make sure you discuss any questions you have with your health care provider.  Document Revised:  04/21/2021 Document Reviewed: 04/21/2021  [employer]vier Patient Education © 2021 [employer]

## 2021-12-09 NOTE — PROGRESS NOTES
Primary Care Provider  Milton Zamarripa APRN     Referring Provider  No ref. provider found     Chief Complaint  Asthma and Sleep Apnea    Subjective          History of Presenting Illness  Patient is a 61-year-old female, patient of Dr. Mitchell who has a history of asthma who presents for follow-up visit today.  Patient states that she stopped Dupixent as 2 to 3 weeks ago she was experiencing left eye irritation, pain, and puffiness and was concerned it was caused by the Dupixent.  Patient states that she saw an optometrist at [facility] last week and was told that she had a left retinal hemorrhage and has been referred to a retinal specialist and is scheduled to see them on December 22, 2021.  Patient states that she has Advair at home however she does not use it every day.  Patient states that she stopped Spiriva due to medication making her mouth and throat dry.  Patient states she is under the care of ENT for history of laryngitis.  Patient states she is also taking Pepcid and Protonix for reflux.  Patient states she is also been referred to an allergist to have allergy testing completed.  Patient states that time she has an intermittent cough at times at night if she eats before bed.    Patient denies fever, chills, night sweats, swollen glands in the head and neck, unintentional weight loss, hemoptysis, purulent sputum production, dysphagia, chest pain, palpitations, chest tightness, abdominal pain, nausea, vomiting, and diarrhea.  Patient also denies any myalgias, changes in sense of taste and/or smell, sore throat, any other coronavirus or flu-like symptoms.  Patient denies any leg swelling, orthopnea, paroxysmal nocturnal dyspnea.  Patient is able to perform activities of daily living.      Review of Systems   Constitutional: Negative for activity change, appetite change, chills, diaphoresis, fatigue, fever, unexpected weight gain and unexpected weight loss.        Negative for Insomnia   HENT: Negative  for congestion (Nasal), mouth sores, nosebleeds, postnasal drip, sore throat, swollen glands and trouble swallowing.         Negative for Thrush  Negative for Hoarseness  Negative for Allergies/Hay Fever  Negative for Recent Head injury  Negative for Ear Fullness  Negative for Nasal or Sinus pain  Negative for Dry lips  Negative for Nasal discharge   Eyes: Positive for pain (left eye: pt reports she is scheduled to see retinal specialist on 12/22/21.). Negative for blurred vision, double vision, photophobia, discharge, redness and visual disturbance.   Respiratory: Positive for cough (intermittent). Negative for apnea, chest tightness, shortness of breath and wheezing.         Negative for Hemoptysis  Negative for Pleuritic pain   Cardiovascular: Negative for chest pain, palpitations and leg swelling.        Negative for Claudication  Negative for Cyanosis  Negative for Dyspnea on exertion   Gastrointestinal: Positive for GERD. Negative for abdominal pain, diarrhea, nausea and vomiting.   Musculoskeletal: Negative for joint swelling and myalgias.        Negative for Joint pain  Negative for Joint stiffness   Skin: Negative for color change, dry skin, pallor and rash.   Neurological: Negative for syncope, weakness and headache.   Hematological: Negative for adenopathy. Does not bruise/bleed easily.        Family History   Problem Relation Age of Onset   • Arthritis Mother    • Heart disease Mother    • Diabetes Mother    • Arthritis Father    • Stroke Father    • Heart disease Father    • Diabetes Father    • Cancer Sister         Social History     Socioeconomic History   • Marital status:    Tobacco Use   • Smoking status: Former Smoker     Packs/day: 1.00     Years: 5.00     Pack years: 5.00     Types: Cigarettes   • Smokeless tobacco: Never Used   Vaping Use   • Vaping Use: Never used   Substance and Sexual Activity   • Alcohol use: Never   • Drug use: Never   • Sexual activity: Defer        Past Medical  History:   Diagnosis Date   • Allergic    • Anxiety    • Asthma    • Change in voice    • Cough    • Dry mouth    • GERD (gastroesophageal reflux disease) 08/28/2019   • History of tobacco use    • Hoarseness of voice    • Hypothyroidism 08/28/2019   • Laryngeal candidiasis    • Low back pain         Immunization History   Administered Date(s) Administered   • COVID-19 (PFIZER) 03/26/2021, 04/16/2021, 11/10/2021   • Flu Vaccine Quad PF >18YRS 09/30/2020   • Flu Vaccine Quad PF >36MO 10/28/2019   • Influenza, Unspecified 09/30/2020       No Known Allergies       Current Outpatient Medications:   •  Alcohol Swabs (Easy Touch Alcohol Prep Medium) 70 % pads, , Disp: , Rfl:   •  Calcium Carbonate-Vitamin D (calcium-vitamin D) 500-200 MG-UNIT tablet per tablet, , Disp: , Rfl:   •  diclofenac (VOLTAREN) 75 MG EC tablet, , Disp: , Rfl:   •  Dymista 137-50 MCG/ACT suspension, 1 spray into the nostril(s) as directed by provider 2 (Two) Times a Day for 90 days., Disp: 69 g, Rfl: 3  •  escitalopram (LEXAPRO) 20 MG tablet, Take 1 tablet by mouth Daily., Disp: 90 tablet, Rfl: 1  •  estradiol (MINIVELLE, VIVELLE-DOT) 0.05 MG/24HR patch, , Disp: , Rfl:   •  famotidine (PEPCID) 20 MG tablet, Take 1 tablet by mouth At Night As Needed for Heartburn., Disp: 90 tablet, Rfl: 1  •  fluticasone-salmeterol (ADVAIR HFA) 230-21 MCG/ACT inhaler, Inhale 2 puffs 2 (Two) Times a Day for 90 days., Disp: 3 each, Rfl: 3  •  ipratropium-albuterol (DUO-NEB) 0.5-2.5 mg/3 ml nebulizer, , Disp: , Rfl:   •  loratadine (CLARITIN) 10 MG tablet, , Disp: , Rfl:   •  montelukast (SINGULAIR) 10 MG tablet, montelukast 10 mg oral tablet take 1 tablet (10 mg) by oral route once daily in the evening   Suspended, Disp: , Rfl:   •  neomycin-bacitracin-polymyxin (NEOSPORIN) 5-400-5000 ointment, , Disp: , Rfl:   •  pantoprazole (Protonix) 40 MG EC tablet, Take 1 tablet by mouth Daily for 90 days., Disp: 90 tablet, Rfl: 1  •  PreviDent 5000 Plus 1.1 % cream, , Disp: ,  "Rfl:   •  Synthroid 125 MCG tablet, , Disp: , Rfl:   •  traMADol (ULTRAM) 50 MG tablet, , Disp: , Rfl:   •  predniSONE (DELTASONE) 20 MG tablet, Take 2 tablets by mouth Daily., Disp: 14 tablet, Rfl: 0  •  tolterodine (DETROL) 2 MG tablet, Take 1 tablet by mouth 2 (Two) Times a Day., Disp: 180 tablet, Rfl: 1     Objective     Physical Exam  Vital Signs Reviewed  WDWN, Alert, NAD.    HEENT:  PERRL, EOMI.  OP, nares clear, no sinus tenderness  Neck:  Supple, no JVD, no thyromegaly.  Lymph: no axillary, cervical, supraclavicular lymphadenopathy noted bilaterally  Chest:  good aeration, clear to auscultation bilaterally, tympanic to percussion bilaterally, no work of breathing noted  CV: RRR, no MGR, pulses 2+, equal.  Abd:  Soft, NT, ND, + BS, no HSM  EXT:  no clubbing, no cyanosis, no edema, no joint tenderness  Neuro:  A&Ox3, CN grossly intact, no focal deficits.  Skin: No rashes or lesions noted.    Vital Signs:   /54 (BP Location: Left arm, Patient Position: Sitting, Cuff Size: Adult)   Pulse 67   Resp 14   Ht 167.6 cm (66\")   Wt 95.3 kg (210 lb)   SpO2 98% Comment: room air  BMI 33.89 kg/m²         Result Review :   I have personally reviewed Dr. Vee's last office visit note.         Assessment and Plan      Assessment:  1.  Severe persistent uncontrolled asthma, patient on triple inhaler therapy and  dupixent injections-now controlled      2.  Nasal polyps.      3.  Allergic rhinitis.  4.  Seasonal allergies.      5.  Dyspnea-improved      6.  Elevated IgE level.   7.. Tobacco abuse with cigarettes in remission.         Plan:  1.  Continue Singulair and Dymista everyday as prescribed.      2.    Patient is advised to take Advair 2 puffs twice daily everyday as prescribed and rinse her mouth out after each use.  Patient reports is not able to tolerate LAMA therapy due to medication drying her mouth and throat out.  3.  Continue albuterol inhaler and DuoNeb nebulizer treatments as needed.      4.    " Offered to switch patient over to Fasenra as patient is concerned that Dupixent is affecting her eyes.  Patient would like to wait until she speaks with a retinal specialist before switching medications as patient states Dupixent does really help with her symptoms and would prefer to stay on that medication.   5. Will order CBC and IgE level.   6. Patient is advised to call the office, 911 or go to the ER with any new or worsening symptoms.      7. Up-to-date with flu and Pneumovax and Covid vaccines.  Patient eligible for Prevnar vaccine at age 65.  Patient is advised to continue to follow CDC recommendations such as social distancing, wearing a mask, and washing hands for least 20 seconds.  8.  Follow-up 1 week after seeing retinal specialist to restart Dupixent or start different biologic, sooner if needed.          Follow Up   Return for beginning of January, 2022.  Patient was given instructions and counseling regarding her condition or for health maintenance advice. Please see specific information pulled into the AVS if appropriate.

## 2021-12-18 ENCOUNTER — HOSPITAL ENCOUNTER (EMERGENCY)
Facility: HOSPITAL | Age: 61
Discharge: HOME | End: 2021-12-18
Attending: EMERGENCY MEDICINE | Admitting: EMERGENCY MEDICINE

## 2021-12-18 VITALS
HEART RATE: 75 BPM | OXYGEN SATURATION: 97 % | BODY MASS INDEX: 33.89 KG/M2 | HEIGHT: 66 IN | SYSTOLIC BLOOD PRESSURE: 127 MMHG | RESPIRATION RATE: 18 BRPM | DIASTOLIC BLOOD PRESSURE: 60 MMHG | TEMPERATURE: 98.1 F

## 2021-12-18 DIAGNOSIS — Z48.89 ENCOUNTER FOR POST SURGICAL WOUND CHECK: Primary | ICD-10-CM

## 2021-12-18 PROCEDURE — 99282 EMERGENCY DEPT VISIT SF MDM: CPT

## 2021-12-18 NOTE — ED PROVIDER NOTES
Time: 3:05 PM EST  Arrived by: POV  Chief Complaint: Wound check  History provided by: Patient      History of Present Illness:  Patient is a 61 y.o. year old female that presents to the emergency department with concerns over the appearance of her surgical site.  She states she removed her Ace wrap yesterday and dressing is stuck on wound and she would like to get it checked.       used: No    Knee Pain  Associated symptoms: no fever    Wound Check  Location:  Knee  Severity:  Mild  Onset quality:  Sudden  Timing:  Constant  Progression:  Improving  Chronicity:  New  Associated symptoms: no abdominal pain, no chest pain, no congestion, no cough, no diarrhea, no ear pain, no fever, no headaches, no nausea, no shortness of breath, no sore throat and no vomiting            Similar Symptoms Previously: No  Recently seen: Yes, orthopedic surgeon in [place]      Patient Care Team  Primary Care Provider: FERNY Milligan    Past Medical History:     No Known Allergies  Past Medical History:   Diagnosis Date   • Allergic    • Anxiety    • Asthma    • Change in voice    • Cough    • Dry mouth    • GERD (gastroesophageal reflux disease) 08/28/2019   • History of tobacco use    • Hoarseness of voice    • Hypothyroidism 08/28/2019   • Laryngeal candidiasis    • Low back pain      Past Surgical History:   Procedure Laterality Date   • COLONOSCOPY     • KNEE ARTHROSCOPY W/ MENISCAL REPAIR     • OTHER SURGICAL HISTORY      uterine ablation   • UPPER GASTROINTESTINAL ENDOSCOPY       Family History   Problem Relation Age of Onset   • Arthritis Mother    • Heart disease Mother    • Diabetes Mother    • Arthritis Father    • Stroke Father    • Heart disease Father    • Diabetes Father    • Cancer Sister        Home Medications:  Prior to Admission medications    Medication Sig Start Date End Date Taking? Authorizing Provider   OXYCODONE HCL PO Take  by mouth.   Yes Provider, MD Bruce   Alcohol Swabs (Easy  Touch Alcohol Prep Medium) 70 % pads  5/23/21   Bruce Boles MD   Calcium Carbonate-Vitamin D (calcium-vitamin D) 500-200 MG-UNIT tablet per tablet  5/23/21   Bruce Boles MD   diclofenac (VOLTAREN) 75 MG EC tablet  5/26/21   Bruce Boles MD   Dymista 137-50 MCG/ACT suspension 1 spray into the nostril(s) as directed by provider 2 (Two) Times a Day for 90 days. 12/9/21 3/9/22  Jacque Dunn APRN   escitalopram (LEXAPRO) 20 MG tablet Take 1 tablet by mouth Daily. 8/13/21   Milton Zamarripa APRN   estradiol (MINIVELLE, VIVELLE-DOT) 0.05 MG/24HR patch  10/29/21   Bruce Boles MD   famotidine (PEPCID) 20 MG tablet Take 1 tablet by mouth At Night As Needed for Heartburn. 10/20/21   Iliana Figueroa APRN   fluticasone-salmeterol (ADVAIR HFA) 230-21 MCG/ACT inhaler Inhale 2 puffs 2 (Two) Times a Day for 90 days. 12/9/21 3/9/22  Jacque Dunn APRN   ipratropium-albuterol (DUO-NEB) 0.5-2.5 mg/3 ml nebulizer  5/26/21   Bruce Boles MD   loratadine (CLARITIN) 10 MG tablet  5/26/21   Bruce Boles MD   montelukast (SINGULAIR) 10 MG tablet montelukast 10 mg oral tablet take 1 tablet (10 mg) by oral route once daily in the evening   Suspended    Bruce Boles MD   neomycin-bacitracin-polymyxin (NEOSPORIN) 5-400-5000 ointment  10/29/21   Bruce Boles MD   pantoprazole (Protonix) 40 MG EC tablet Take 1 tablet by mouth Daily for 90 days. 10/20/21 1/18/22  Iliana Figueroa APRN   predniSONE (DELTASONE) 20 MG tablet Take 2 tablets by mouth Daily. 12/9/21   Jacque Dunn APRN   PreviDent 5000 Plus 1.1 % cream  10/21/21   Bruce Boles MD   Synthroid 125 MCG tablet  10/29/21   Provider, MD Bruce   tolterodine (DETROL) 2 MG tablet Take 1 tablet by mouth 2 (Two) Times a Day. 11/1/21   Milton Zamarripa APRN   traMADol (ULTRAM) 50 MG tablet  5/26/21   Provider, MD Bruce        Social History:   PT  reports that she has quit smoking.  "Her smoking use included cigarettes. She has a 5.00 pack-year smoking history. She has never used smokeless tobacco. She reports that she does not drink alcohol and does not use drugs.    Record Review:  I have reviewed the patient's records in [facility].     Review of Systems  Review of Systems   Constitutional: Negative for chills and fever.   HENT: Negative for congestion, ear pain and sore throat.    Eyes: Negative for pain.   Respiratory: Negative for cough, chest tightness and shortness of breath.    Cardiovascular: Negative for chest pain.   Gastrointestinal: Negative for abdominal pain, diarrhea, nausea and vomiting.   Genitourinary: Negative for flank pain and hematuria.   Musculoskeletal: Negative for joint swelling.   Skin: Positive for wound. Negative for pallor.   Neurological: Negative for seizures and headaches.   All other systems reviewed and are negative.       Physical Exam    /60 (BP Location: Left arm, Patient Position: Sitting)   Pulse 75   Temp 98.1 °F (36.7 °C) (Oral)   Resp 18   Ht 167.6 cm (66\")   SpO2 97%   BMI 33.89 kg/m²     Physical Exam  Vitals and nursing note reviewed.   Constitutional:       General: She is not in acute distress.     Appearance: Normal appearance. She is not toxic-appearing.   HENT:      Head: Normocephalic and atraumatic.      Mouth/Throat:      Mouth: Mucous membranes are moist.   Eyes:      General: No scleral icterus.  Cardiovascular:      Rate and Rhythm: Normal rate and regular rhythm.      Pulses: Normal pulses.      Heart sounds: Normal heart sounds.   Pulmonary:      Effort: Pulmonary effort is normal. No respiratory distress.      Breath sounds: Normal breath sounds.   Abdominal:      General: Abdomen is flat.      Palpations: Abdomen is soft.      Tenderness: There is no abdominal tenderness.   Musculoskeletal:         General: Normal range of motion.      Cervical back: Normal range of motion and neck supple.   Skin:     General: Skin is warm and " "dry.      Comments: Well-healing surgical incisions medial and lateral to right patella without signs of infection   Neurological:      Mental Status: She is alert and oriented to person, place, and time. Mental status is at baseline.                  ED Course  /60 (BP Location: Left arm, Patient Position: Sitting)   Pulse 75   Temp 98.1 °F (36.7 °C) (Oral)   Resp 18   Ht 167.6 cm (66\")   SpO2 97%   BMI 33.89 kg/m²   No results found for this or any previous visit.  Medications - No data to display  MRI Knee Left Without Contrast    Result Date: 11/19/2021  Narrative: PROCEDURE: MRI KNEE LEFT  WO CONTRAST  COMPARISON: None available.  INDICATIONS: ANTERIOR LEFT KNEE PAIN, SWELLING AND POPPING X 2 MONTHS, NO KNOWN INJURY  TECHNIQUE: A complete multi-planar MRI was performed.   FINDINGS:  SOFT TISSUES:  Partially imaged moderate to large joint effusion with mild synovial thickening and/or intra-articular debris.  No discrete intra-articular body is identified.  No popliteal cyst.  Mild nonspecific prepatellar soft tissue edema.  No solid soft tissue mass.  MENISCI: The medial meniscus is intact. The lateral meniscus is intact.  CRUCIATE LIGAMENTS: The ACL is intact. The PCL is intact.  COLLATERAL LIGAMENTS: The MCL is intact. The LCL complex is intact.  EXTENSOR MECHANISM: The quadriceps tendon is intact.  The patellar tendon is intact.  ARTICULAR CARTILAGE:  Diffuse grade 2-3 chondromalacia in the patellofemoral compartment with a large region of full-thickness or near full-thickness chondral thinning at the medial patellar facet and multifocal full-thickness chondral fissuring at the inferior trochlea.  Diffuse grade 3-4 chondromalacia in the medial compartment with full-thickness or near full-thickness chondral loss at the weight-bearing portions of the medial femoral condyle and medial tibial plateau.  Mild grade 2 chondromalacia in the lateral compartment.  BONES:  Focal nondisplaced subchondral " insufficiency type fracture at the anterior medial weight-bearing portion of the medial tibial plateau.  Multifocal subchondral cystic changes at the trochlea, likely related overlying chondromalacia.  No concerning bone marrow lesion or marrow replacing process.  CONTINUED ON NEXT PAGE...   CONCLUSION:  1. Tricompartmental chondromalacia, most advanced in the medial and patellofemoral compartments, with focal nondisplaced subchondral insufficiency type fracture at the anterior weight-bearing portion of the medial tibial plateau. 2. Partially imaged moderate to large joint effusion with mild diffuse synovial thickening and/or intra-articular debris.  3. The menisci, cruciate ligaments, and collateral ligaments are intact.     RAVINDER ORTEZ MD       Electronically Signed and Approved By: RAVINDER ORTEZ MD on 11/19/2021 at 12:18               Procedures/EKGs:  Procedures        Medical Decision Making:                     MDM  Number of Diagnoses or Management Options  Encounter for post surgical wound check: new and does not require workup  Diagnosis management comments: I do not believe that the patient has an acute emergency medical condition requiring additional emergency management at this time. The patient is currently stable for outpatient treatment and continuation of care. Important signs and symptoms that would warrant return to the emergency department were reviewed. The patient was provided the opportunity to ask questions. All questions were addressed and the patient was discharged from the ED. The patient demonstrated understanding and agreed to plan    Risk of Complications, Morbidity, and/or Mortality  Presenting problems: minimal  Diagnostic procedures: minimal  Management options: minimal    Patient Progress  Patient progress: stable       Final diagnoses:   Encounter for post surgical wound check        Disposition:  ED Disposition     ED Disposition Condition Comment    Discharge Stable             Tianna Mujica, APRN  12/18/21 [id number]

## 2021-12-18 NOTE — DISCHARGE INSTRUCTIONS
Your wound looks as expected.  Continue to keep it clean and dry.  You may wash it briefly then pat dry.  Use the nonstick dressings until you have your follow-up appointment next Thursday.  Continue to monitor for signs of infection such as pus drainage, increasing redness, swelling, fever/chills, or any other concerning signs and symptoms.

## 2022-01-04 ENCOUNTER — TELEPHONE (OUTPATIENT)
Dept: GASTROENTEROLOGY | Facility: CLINIC | Age: 62
End: 2022-01-04

## 2022-01-10 ENCOUNTER — TELEPHONE (OUTPATIENT)
Dept: FAMILY MEDICINE CLINIC | Facility: CLINIC | Age: 62
End: 2022-01-10

## 2022-01-10 DIAGNOSIS — H26.9 CATARACT OF BOTH EYES, UNSPECIFIED CATARACT TYPE: Primary | ICD-10-CM

## 2022-01-10 RX ORDER — MULTIPLE VITAMINS W/ MINERALS TAB 9MG-400MCG
1 TAB ORAL DAILY
COMMUNITY

## 2022-01-10 RX ORDER — ASCORBIC ACID 500 MG
500 TABLET ORAL DAILY
COMMUNITY
End: 2022-11-02

## 2022-01-10 NOTE — PRE-PROCEDURE INSTRUCTIONS
Called patient to discuss instructions for NPO after midnight and pre-op medications. Patient aware she can take Dymista, lexapro, pepcid, advair, du-neb tx, claritin, singulair, protonix, detrol, and synthroid.  Patient stated understanding. No other issues or concerns noted currently per patient.  Patient is vaccinated for covid-19.

## 2022-01-10 NOTE — TELEPHONE ENCOUNTER
Caller: Judy Ramey    Relationship to patient: Self    Best call back number: [phone]    Chief complaint: RASH ON FOREHEAD FOR ALMOST 4 DAYS THAT IS SPREADING.     Type of visit: OFFICE VISIT     Requested date: ANY DAY EXCEPT Tuesday 01/11/2022  OR Wednesday, 01/12/2022         Additional notes: PATIENT STATES THAT SHE IS ALSO NEEDING TO KNOW IF PCP RECEIVED A REFERRAL FROM [facility] FOR HER CATARACTS.   [facility] DID SCHEDULED PATIENT TO BE SEEN BY LUANA HUI FOR Wednesday, January 12 AT 1:15 PM. HOWEVER, PATIENT STILL PREFERS TO SEE [facility] AND STATES THAT SHE CAN EVEN COME IN ON Friday, January 14, 2022 IF NEEDED.       [facility] ATTEMPTED TO SCHEDULE WITH PCP BUT HAD NO AVAILABILITY FOR THE DATES PATIENT REQUESTED.

## 2022-01-11 ENCOUNTER — ANESTHESIA EVENT (OUTPATIENT)
Dept: GASTROENTEROLOGY | Facility: HOSPITAL | Age: 62
End: 2022-01-11

## 2022-01-11 ENCOUNTER — HOSPITAL ENCOUNTER (OUTPATIENT)
Facility: HOSPITAL | Age: 62
Setting detail: HOSPITAL OUTPATIENT SURGERY
Discharge: HOME | End: 2022-01-11
Attending: INTERNAL MEDICINE | Admitting: INTERNAL MEDICINE

## 2022-01-11 ENCOUNTER — TELEPHONE (OUTPATIENT)
Dept: PULMONOLOGY | Facility: CLINIC | Age: 62
End: 2022-01-11

## 2022-01-11 ENCOUNTER — ANESTHESIA (OUTPATIENT)
Dept: GASTROENTEROLOGY | Facility: HOSPITAL | Age: 62
End: 2022-01-11

## 2022-01-11 VITALS
RESPIRATION RATE: 18 BRPM | SYSTOLIC BLOOD PRESSURE: 131 MMHG | TEMPERATURE: 97.1 F | WEIGHT: 212.52 LBS | OXYGEN SATURATION: 98 % | BODY MASS INDEX: 34.16 KG/M2 | HEIGHT: 66 IN | DIASTOLIC BLOOD PRESSURE: 59 MMHG | HEART RATE: 56 BPM

## 2022-01-11 DIAGNOSIS — K21.9 GASTROESOPHAGEAL REFLUX DISEASE, UNSPECIFIED WHETHER ESOPHAGITIS PRESENT: ICD-10-CM

## 2022-01-11 DIAGNOSIS — R49.0 DYSPHONIA: ICD-10-CM

## 2022-01-11 PROCEDURE — 43239 EGD BIOPSY SINGLE/MULTIPLE: CPT | Performed by: INTERNAL MEDICINE

## 2022-01-11 PROCEDURE — 88305 TISSUE EXAM BY PATHOLOGIST: CPT | Performed by: INTERNAL MEDICINE

## 2022-01-11 PROCEDURE — 25010000002 PROPOFOL 10 MG/ML EMULSION: Performed by: NURSE ANESTHETIST, CERTIFIED REGISTERED

## 2022-01-11 RX ORDER — LIDOCAINE HYDROCHLORIDE 20 MG/ML
INJECTION, SOLUTION INFILTRATION; PERINEURAL AS NEEDED
Status: DISCONTINUED | OUTPATIENT
Start: 2022-01-11 | End: 2022-01-11 | Stop reason: SURG

## 2022-01-11 RX ORDER — SODIUM CHLORIDE, SODIUM LACTATE, POTASSIUM CHLORIDE, CALCIUM CHLORIDE 600; 310; 30; 20 MG/100ML; MG/100ML; MG/100ML; MG/100ML
30 INJECTION, SOLUTION INTRAVENOUS CONTINUOUS
Status: DISCONTINUED | OUTPATIENT
Start: 2022-01-11 | End: 2022-01-11 | Stop reason: HOSPADM

## 2022-01-11 RX ADMIN — PROPOFOL 175 MCG/KG/MIN: 10 INJECTION, EMULSION INTRAVENOUS at 08:45

## 2022-01-11 RX ADMIN — SODIUM CHLORIDE, POTASSIUM CHLORIDE, SODIUM LACTATE AND CALCIUM CHLORIDE 30 ML/HR: 600; 310; 30; 20 INJECTION, SOLUTION INTRAVENOUS at 07:55

## 2022-01-11 RX ADMIN — LIDOCAINE HYDROCHLORIDE 100 MG: 20 INJECTION, SOLUTION INFILTRATION; PERINEURAL at 08:45

## 2022-01-11 NOTE — TELEPHONE ENCOUNTER
Pt left voicemail requesting call back from Dr. Mariusz PEREIRA, no other details left.  [phone]

## 2022-01-11 NOTE — H&P
Pre Procedure History & Physical    Chief Complaint:   GERD    Subjective     HPI:   62 yo F here for eval of GERD.    Past Medical History:   Past Medical History:   Diagnosis Date   • Allergic    • Anxiety    • Asthma    • Cataracts, bilateral     Left eye worse per patient   • Change in voice    • Cough    • Dry mouth    • GERD (gastroesophageal reflux disease) 08/28/2019   • History of tobacco use    • Hoarseness of voice    • Hypothyroidism 08/28/2019   • Laryngeal candidiasis    • Low back pain        Past Surgical History:  Past Surgical History:   Procedure Laterality Date   • COLONOSCOPY     • KNEE ARTHROSCOPY W/ MENISCAL REPAIR     • OTHER SURGICAL HISTORY      uterine ablation   • UPPER GASTROINTESTINAL ENDOSCOPY         Family History:  Family History   Problem Relation Age of Onset   • Arthritis Mother    • Heart disease Mother    • Diabetes Mother    • Arthritis Father    • Stroke Father    • Heart disease Father    • Diabetes Father    • Cancer Sister    • Malig Hyperthermia Neg Hx        Social History:   reports that she has quit smoking. Her smoking use included cigarettes. She has a 5.00 pack-year smoking history. She has never used smokeless tobacco. She reports that she does not drink alcohol and does not use drugs.    Medications:   Medications Prior to Admission   Medication Sig Dispense Refill Last Dose   • Alcohol Swabs (Easy Touch Alcohol Prep Medium) 70 % pads    Past Week at Unknown time   • ascorbic acid (VITAMIN C) 500 MG tablet Take 500 mg by mouth Daily.   Past Week at Unknown time   • Calcium Carbonate-Vitamin D (calcium-vitamin D) 500-200 MG-UNIT tablet per tablet Take 1 tablet by mouth Daily.   Past Week at Unknown time   • diclofenac (VOLTAREN) 75 MG EC tablet Take 75 mg by mouth 2 (Two) Times a Day.   Past Week at Unknown time   • Dymista 137-50 MCG/ACT suspension 1 spray into the nostril(s) as directed by provider 2 (Two) Times a Day for 90 days. 69 g 3 1/10/2022 at Unknown time  "  • escitalopram (LEXAPRO) 20 MG tablet Take 1 tablet by mouth Daily. 90 tablet 1 Past Week at Unknown time   • estradiol (MINIVELLE, VIVELLE-DOT) 0.05 MG/24HR patch Place 1 patch on the skin as directed by provider 1 (One) Time Per Week.   Past Week at Unknown time   • famotidine (PEPCID) 20 MG tablet Take 1 tablet by mouth At Night As Needed for Heartburn. 90 tablet 1 Past Week at Unknown time   • fluticasone-salmeterol (ADVAIR HFA) 230-21 MCG/ACT inhaler Inhale 2 puffs 2 (Two) Times a Day for 90 days. 3 each 3 Past Week at Unknown time   • ipratropium-albuterol (DUO-NEB) 0.5-2.5 mg/3 ml nebulizer 3 mL 4 (Four) Times a Day.   Past Week at Unknown time   • loratadine (CLARITIN) 10 MG tablet    Past Week at Unknown time   • montelukast (SINGULAIR) 10 MG tablet montelukast 10 mg oral tablet take 1 tablet (10 mg) by oral route once daily in the evening   Suspended   1/10/2022 at Unknown time   • multivitamin with minerals tablet tablet Take 1 tablet by mouth Daily.   Past Week at Unknown time   • neomycin-bacitracin-polymyxin (NEOSPORIN) 5-400-5000 ointment    1/10/2022 at Unknown time   • pantoprazole (Protonix) 40 MG EC tablet Take 1 tablet by mouth Daily for 90 days. 90 tablet 1 Past Week at Unknown time   • PreviDent 5000 Plus 1.1 % cream    Past Week at Unknown time   • Synthroid 125 MCG tablet Take 125 mcg by mouth Daily.   Past Week at Unknown time   • tolterodine (DETROL) 2 MG tablet Take 1 tablet by mouth 2 (Two) Times a Day. 180 tablet 1 Past Week at Unknown time   • traMADol (ULTRAM) 50 MG tablet Take 50 mg by mouth Every 6 (Six) Hours As Needed.   Past Week at Unknown time       Allergies:  Patient has no known allergies.    ROS:    Pertinent items are noted in HPI     Objective     Blood pressure 134/72, pulse 55, temperature 97.7 °F (36.5 °C), temperature source Temporal, resp. rate 18, height 167.6 cm (66\"), weight 96.4 kg (212 lb 8.4 oz), SpO2 98 %, not currently breastfeeding.    Physical Exam "   Constitutional: Pt is oriented to person, place, and time and well-developed, well-nourished, and in no distress.   Mouth/Throat: Oropharynx is clear and moist.   Neck: Normal range of motion.   Cardiovascular: Normal rate, regular rhythm and normal heart sounds.    Pulmonary/Chest: Effort normal and breath sounds normal.   Abdominal: Soft. Nontender  Skin: Skin is warm and dry.   Psychiatric: Mood, memory, affect and judgment normal.     Assessment/Plan     Diagnosis:  GERD    Anticipated Surgical Procedure:  EGD    The risks, benefits, and alternatives of this procedure have been discussed with the patient or the responsible party- the patient understands and agrees to proceed.

## 2022-01-11 NOTE — ANESTHESIA POSTPROCEDURE EVALUATION
Patient: Judy Ramey    Procedure Summary     Date: 01/11/22 Room / Location: [facility] 3 / [facility]    Anesthesia Start: 0843 Anesthesia Stop: 0857    Procedure: ESOPHAGOGASTRODUODENOSCOPY WITH BX (N/A ) Diagnosis:       Gastroesophageal reflux disease, unspecified whether esophagitis present      Dysphonia      (Gastroesophageal reflux disease, unspecified whether esophagitis present [K21.9])      (Dysphonia [R49.0])    Surgeons: Faina Mtz MD Provider: Curtis Funez MD    Anesthesia Type: general ASA Status: 3          Anesthesia Type: general    Vitals  Vitals Value Taken Time   /61 01/11/22 0907   Temp     Pulse 63 01/11/22 0908   Resp     SpO2 97 % 01/11/22 0908   Vitals shown include unvalidated device data.        Post Anesthesia Care and Evaluation    Patient location during evaluation: bedside  Patient participation: complete - patient participated  Level of consciousness: awake  Pain management: adequate  Airway patency: patent  Anesthetic complications: No anesthetic complications  PONV Status: none  Cardiovascular status: acceptable and stable  Respiratory status: acceptable  Hydration status: acceptable    Comments: An Anesthesiologist personally participated in the most demanding procedures (including induction and emergence if applicable) in the anesthesia plan, monitored the course of anesthesia administration at frequent intervals and remained physically present and available for immediate diagnosis and treatment of emergencies.

## 2022-01-11 NOTE — ANESTHESIA PREPROCEDURE EVALUATION
Anesthesia Evaluation     Patient summary reviewed and Nursing notes reviewed   no history of anesthetic complications:  NPO Solid Status: > 8 hours  NPO Liquid Status: > 2 hours           Airway   Mallampati: I  TM distance: >3 FB  Neck ROM: full  No difficulty expected  Dental    (+) partials    Pulmonary - normal exam    breath sounds clear to auscultation  (+) recent URI, sleep apnea,     ROS comment: Positive Covid test yesterday, scheduled for hysterectomy tomorrow in [place]  Cardiovascular - negative cardio ROS and normal exam  Exercise tolerance: good (4-7 METS)    Rhythm: regular        Neuro/Psych  (+) psychiatric history Anxiety,     GI/Hepatic/Renal/Endo    (+)  GERD,      Musculoskeletal (-) negative ROS    Abdominal    Substance History - negative use     OB/GYN negative ob/gyn ROS         Other - negative ROS                     Anesthesia Plan    ASA 3     general   total IV anesthesia    Anesthetic plan, all risks, benefits, and alternatives have been provided, discussed and informed consent has been obtained with: patient and spouse/significant other.

## 2022-01-12 ENCOUNTER — TELEPHONE (OUTPATIENT)
Dept: GASTROENTEROLOGY | Facility: CLINIC | Age: 62
End: 2022-01-12

## 2022-01-12 LAB
CYTO UR: NORMAL
LAB AP CASE REPORT: NORMAL
LAB AP CLINICAL INFORMATION: NORMAL
PATH REPORT.FINAL DX SPEC: NORMAL
PATH REPORT.GROSS SPEC: NORMAL

## 2022-01-12 NOTE — TELEPHONE ENCOUNTER
Spoke to pt, she would like to restart Dupixent since she found out that her eye problems were not caused by the injection. Ok to do per . Pt would like [facility]. Thank you.

## 2022-01-14 ENCOUNTER — OFFICE VISIT (OUTPATIENT)
Dept: FAMILY MEDICINE CLINIC | Facility: CLINIC | Age: 62
End: 2022-01-14

## 2022-01-14 VITALS
WEIGHT: 212.8 LBS | BODY MASS INDEX: 34.2 KG/M2 | HEART RATE: 83 BPM | SYSTOLIC BLOOD PRESSURE: 132 MMHG | OXYGEN SATURATION: 98 % | HEIGHT: 66 IN | TEMPERATURE: 98.4 F | DIASTOLIC BLOOD PRESSURE: 80 MMHG

## 2022-01-14 DIAGNOSIS — L30.9 ECZEMA, UNSPECIFIED TYPE: Primary | ICD-10-CM

## 2022-01-14 DIAGNOSIS — B35.1 ONYCHOMYCOSIS: ICD-10-CM

## 2022-01-14 PROBLEM — H44.9 DISORDER OF GLOBE: Status: ACTIVE | Noted: 2022-01-14

## 2022-01-14 PROCEDURE — 99213 OFFICE O/P EST LOW 20 MIN: CPT | Performed by: NURSE PRACTITIONER

## 2022-01-14 RX ORDER — TERBINAFINE HYDROCHLORIDE 250 MG/1
250 TABLET ORAL DAILY
Qty: 30 TABLET | Refills: 0 | Status: SHIPPED | OUTPATIENT
Start: 2022-01-14 | End: 2022-04-18

## 2022-01-14 RX ORDER — DESONIDE 0.5 MG/G
1 CREAM TOPICAL 2 TIMES DAILY
Qty: 15 G | Refills: 0 | Status: SHIPPED | OUTPATIENT
Start: 2022-01-14 | End: 2023-01-17 | Stop reason: SDUPTHER

## 2022-01-14 RX ORDER — LIFITEGRAST 50 MG/ML
SOLUTION/ DROPS OPHTHALMIC
COMMUNITY
Start: 2021-12-09

## 2022-01-14 NOTE — PROGRESS NOTES
"Chief Complaint  Rash    Subjective          Judy Ramey presents to [facility]  Patient presents to the office today with complaints of a rash to her forehead.  She states that this been present for approximately a week.  She does state that she was on injections that helped her skin and states that she has been off of these for approximately a month.  She states that the rash is very dry and scaly.  She also states that it is pruritic.  She denies any changes to soaps lotions or detergents.    She does state that her toenail fungus was improving after taking the oral medication.  States that she only received 30 days of this.  She did inquire about a topical solution.  I did explain the fronts and the efficacy and treatment duration.  Patient did inquire if she could get another prescription for of the oral medication.    Rash        Objective   Vital Signs:   /80 (BP Location: Right arm, Patient Position: Sitting, Cuff Size: Adult)   Pulse 83   Temp 98.4 °F (36.9 °C) (Temporal)   Ht 167.6 cm (66\")   Wt 96.5 kg (212 lb 12.8 oz)   SpO2 98%   BMI 34.35 kg/m²     Physical Exam  Vitals reviewed.   Constitutional:       Appearance: Normal appearance.   Cardiovascular:      Rate and Rhythm: Normal rate and regular rhythm.      Heart sounds: Normal heart sounds, S1 normal and S2 normal. No murmur heard.      Pulmonary:      Effort: Pulmonary effort is normal. No respiratory distress.      Breath sounds: Normal breath sounds.   Feet:      Right foot:      Toenail Condition: Fungal disease present.  Skin:     General: Skin is warm and dry.          Neurological:      Mental Status: She is alert and oriented to person, place, and time.   Psychiatric:         Attention and Perception: Attention normal.         Mood and Affect: Mood normal.         Behavior: Behavior normal.        Result Review :                 Assessment and Plan    Diagnoses and all orders for this " visit:    1. Eczema, unspecified type (Primary)  -     desonide (DesOwen) 0.05 % cream; Apply 1 application topically to the appropriate area as directed 2 (Two) Times a Day.  Dispense: 15 g; Refill: 0    2. Onychomycosis  -     terbinafine (lamiSIL) 250 MG tablet; Take 1 tablet by mouth Daily.  Dispense: 30 tablet; Refill: 0        Follow Up   Return if symptoms worsen or fail to improve.  Patient was given instructions and counseling regarding her condition or for health maintenance advice. Please see specific information pulled into the AVS if appropriate.

## 2022-02-07 RX ORDER — LEVOTHYROXINE SODIUM 125 MCG
125 TABLET ORAL DAILY
Qty: 90 TABLET | Refills: 1 | Status: SHIPPED | OUTPATIENT
Start: 2022-02-07 | End: 2022-08-09 | Stop reason: SDUPTHER

## 2022-02-07 NOTE — TELEPHONE ENCOUNTER
Caller: Judy Ramey    Relationship: Self    Best call back number: [phone]     Requested Prescriptions:   Requested Prescriptions     Pending Prescriptions Disp Refills   • Synthroid 125 MCG tablet       Sig: Take 1 tablet by mouth Daily.        Pharmacy where request should be sent: [facility] [place], [place] - [address] - [phone]  - [phone]     Additional details provided by patient:     Does the patient have less than a 3 day supply:  [x] Yes  [] No

## 2022-02-24 NOTE — TELEPHONE ENCOUNTER
Caller: Judy Ramey    Relationship: Self    Best call back number: [phone]    Requested Prescriptions:   Requested Prescriptions     Pending Prescriptions Disp Refills   • montelukast (SINGULAIR) 10 MG tablet     • loratadine (CLARITIN) 10 MG tablet     • Alcohol Swabs (Easy Touch Alcohol Prep Medium) 70 % pads        PANTOPRAZOLE  40 MG       Pharmacy where request should be sent: [facility], [address] [phone] [facility] [phone] FX     Additional details provided by patient:     Does the patient have less than a 3 day supply:  [x] Yes  [] No    Tatyana Hutchinson Rep   02/24/22 14:12 EST

## 2022-02-25 RX ORDER — ISOPROPYL ALCOHOL 70 ML/100ML
1 SWAB TOPICAL DAILY PRN
Qty: 100 EACH | Refills: 2 | Status: SHIPPED | OUTPATIENT
Start: 2022-02-25

## 2022-02-25 RX ORDER — LORATADINE 10 MG/1
10 TABLET ORAL DAILY
Qty: 90 TABLET | Refills: 1 | Status: SHIPPED | OUTPATIENT
Start: 2022-02-25 | End: 2022-04-08 | Stop reason: SDUPTHER

## 2022-02-25 RX ORDER — MONTELUKAST SODIUM 10 MG/1
10 TABLET ORAL NIGHTLY
Qty: 90 TABLET | Refills: 1 | Status: SHIPPED | OUTPATIENT
Start: 2022-02-25 | End: 2022-04-08 | Stop reason: SDUPTHER

## 2022-03-14 ENCOUNTER — TELEPHONE (OUTPATIENT)
Dept: FAMILY MEDICINE CLINIC | Facility: CLINIC | Age: 62
End: 2022-03-14

## 2022-03-14 DIAGNOSIS — G89.29 CHRONIC KNEE PAIN, UNSPECIFIED LATERALITY: Primary | ICD-10-CM

## 2022-03-14 DIAGNOSIS — M25.569 CHRONIC KNEE PAIN, UNSPECIFIED LATERALITY: Primary | ICD-10-CM

## 2022-03-14 NOTE — TELEPHONE ENCOUNTER
Caller: Judy Ramey    Relationship to patient: Self    Best call back number: [phone] OKAY TO LEAVE A MESSAGE ON PHONE    Patient is needing: PATIENT CALLED IN AND SAID SHE IS STILL HAVING AN ISSUE WITH HER KNEE AND THE SPECIALIST SHE HAD PREVIOUSLY SEEN HAS RETIRED. SHE WOULD LIKE A REFERRAL TO DR. MAMADOU BRONSON. PLEASE CALL PATIENT AND ADVISE.

## 2022-03-17 ENCOUNTER — TELEPHONE (OUTPATIENT)
Dept: FAMILY MEDICINE CLINIC | Facility: CLINIC | Age: 62
End: 2022-03-17

## 2022-03-18 ENCOUNTER — APPOINTMENT (OUTPATIENT)
Dept: GENERAL RADIOLOGY | Facility: HOSPITAL | Age: 62
End: 2022-03-18

## 2022-03-18 ENCOUNTER — HOSPITAL ENCOUNTER (EMERGENCY)
Facility: HOSPITAL | Age: 62
Discharge: HOME | End: 2022-03-18
Attending: EMERGENCY MEDICINE | Admitting: EMERGENCY MEDICINE

## 2022-03-18 ENCOUNTER — APPOINTMENT (OUTPATIENT)
Dept: CARDIOLOGY | Facility: HOSPITAL | Age: 62
End: 2022-03-18

## 2022-03-18 VITALS
OXYGEN SATURATION: 98 % | DIASTOLIC BLOOD PRESSURE: 72 MMHG | WEIGHT: 215.39 LBS | RESPIRATION RATE: 16 BRPM | HEIGHT: 66 IN | TEMPERATURE: 98.1 F | BODY MASS INDEX: 34.62 KG/M2 | SYSTOLIC BLOOD PRESSURE: 147 MMHG | HEART RATE: 57 BPM

## 2022-03-18 DIAGNOSIS — M79.10 MYALGIA: Primary | ICD-10-CM

## 2022-03-18 LAB
ALBUMIN SERPL-MCNC: 4.3 G/DL (ref 3.5–5.2)
ALBUMIN/GLOB SERPL: 1.9 G/DL
ALP SERPL-CCNC: 106 U/L (ref 39–117)
ALT SERPL W P-5'-P-CCNC: 18 U/L (ref 1–33)
ANION GAP SERPL CALCULATED.3IONS-SCNC: 9.3 MMOL/L (ref 5–15)
AST SERPL-CCNC: 17 U/L (ref 1–32)
BASOPHILS # BLD AUTO: 0.03 10*3/MM3 (ref 0–0.2)
BASOPHILS NFR BLD AUTO: 0.7 % (ref 0–1.5)
BH CV LOWER VASCULAR LEFT COMMON FEMORAL AUGMENT: NORMAL
BH CV LOWER VASCULAR LEFT COMMON FEMORAL COMPETENT: NORMAL
BH CV LOWER VASCULAR LEFT COMMON FEMORAL COMPRESS: NORMAL
BH CV LOWER VASCULAR LEFT COMMON FEMORAL PHASIC: NORMAL
BH CV LOWER VASCULAR LEFT COMMON FEMORAL SPONT: NORMAL
BH CV LOWER VASCULAR RIGHT COMMON FEMORAL AUGMENT: NORMAL
BH CV LOWER VASCULAR RIGHT COMMON FEMORAL COMPETENT: NORMAL
BH CV LOWER VASCULAR RIGHT COMMON FEMORAL COMPRESS: NORMAL
BH CV LOWER VASCULAR RIGHT COMMON FEMORAL PHASIC: NORMAL
BH CV LOWER VASCULAR RIGHT COMMON FEMORAL SPONT: NORMAL
BH CV LOWER VASCULAR RIGHT DISTAL FEMORAL COMPRESS: NORMAL
BH CV LOWER VASCULAR RIGHT GASTRONEMIUS COMPRESS: NORMAL
BH CV LOWER VASCULAR RIGHT GREATER SAPH AK COMPRESS: NORMAL
BH CV LOWER VASCULAR RIGHT GREATER SAPH BK COMPRESS: NORMAL
BH CV LOWER VASCULAR RIGHT MID FEMORAL AUGMENT: NORMAL
BH CV LOWER VASCULAR RIGHT MID FEMORAL COMPETENT: NORMAL
BH CV LOWER VASCULAR RIGHT MID FEMORAL COMPRESS: NORMAL
BH CV LOWER VASCULAR RIGHT MID FEMORAL PHASIC: NORMAL
BH CV LOWER VASCULAR RIGHT MID FEMORAL SPONT: NORMAL
BH CV LOWER VASCULAR RIGHT PERONEAL COMPRESS: NORMAL
BH CV LOWER VASCULAR RIGHT POPLITEAL AUGMENT: NORMAL
BH CV LOWER VASCULAR RIGHT POPLITEAL COMPETENT: NORMAL
BH CV LOWER VASCULAR RIGHT POPLITEAL COMPRESS: NORMAL
BH CV LOWER VASCULAR RIGHT POPLITEAL PHASIC: NORMAL
BH CV LOWER VASCULAR RIGHT POPLITEAL SPONT: NORMAL
BH CV LOWER VASCULAR RIGHT POSTERIOR TIBIAL COMPRESS: NORMAL
BH CV LOWER VASCULAR RIGHT PROXIMAL FEMORAL COMPRESS: NORMAL
BH CV LOWER VASCULAR RIGHT SAPHENOFEMORAL JUNCTION COMPRESS: NORMAL
BILIRUB SERPL-MCNC: 0.3 MG/DL (ref 0–1.2)
BUN SERPL-MCNC: 14 MG/DL (ref 8–23)
BUN/CREAT SERPL: 13.9 (ref 7–25)
CALCIUM SPEC-SCNC: 9.1 MG/DL (ref 8.6–10.5)
CHLORIDE SERPL-SCNC: 105 MMOL/L (ref 98–107)
CO2 SERPL-SCNC: 27.7 MMOL/L (ref 22–29)
CREAT SERPL-MCNC: 1.01 MG/DL (ref 0.57–1)
DEPRECATED RDW RBC AUTO: 41.9 FL (ref 37–54)
EGFRCR SERPLBLD CKD-EPI 2021: 63.5 ML/MIN/1.73
EOSINOPHIL # BLD AUTO: 0.06 10*3/MM3 (ref 0–0.4)
EOSINOPHIL NFR BLD AUTO: 1.5 % (ref 0.3–6.2)
ERYTHROCYTE [DISTWIDTH] IN BLOOD BY AUTOMATED COUNT: 13.1 % (ref 12.3–15.4)
GLOBULIN UR ELPH-MCNC: 2.3 GM/DL
GLUCOSE SERPL-MCNC: 130 MG/DL (ref 65–99)
HCT VFR BLD AUTO: 36.4 % (ref 34–46.6)
HGB BLD-MCNC: 12.2 G/DL (ref 12–15.9)
HOLD SPECIMEN: NORMAL
HOLD SPECIMEN: NORMAL
IMM GRANULOCYTES # BLD AUTO: 0.01 10*3/MM3 (ref 0–0.05)
IMM GRANULOCYTES NFR BLD AUTO: 0.2 % (ref 0–0.5)
LYMPHOCYTES # BLD AUTO: 1.59 10*3/MM3 (ref 0.7–3.1)
LYMPHOCYTES NFR BLD AUTO: 38.5 % (ref 19.6–45.3)
MAXIMAL PREDICTED HEART RATE: 159 BPM
MCH RBC QN AUTO: 29.6 PG (ref 26.6–33)
MCHC RBC AUTO-ENTMCNC: 33.5 G/DL (ref 31.5–35.7)
MCV RBC AUTO: 88.3 FL (ref 79–97)
MONOCYTES # BLD AUTO: 0.43 10*3/MM3 (ref 0.1–0.9)
MONOCYTES NFR BLD AUTO: 10.4 % (ref 5–12)
NEUTROPHILS NFR BLD AUTO: 2.01 10*3/MM3 (ref 1.7–7)
NEUTROPHILS NFR BLD AUTO: 48.7 % (ref 42.7–76)
NRBC BLD AUTO-RTO: 0 /100 WBC (ref 0–0.2)
NT-PROBNP SERPL-MCNC: 59.8 PG/ML (ref 0–900)
PLATELET # BLD AUTO: 147 10*3/MM3 (ref 140–450)
PMV BLD AUTO: 9.4 FL (ref 6–12)
POTASSIUM SERPL-SCNC: 4.5 MMOL/L (ref 3.5–5.2)
PROT SERPL-MCNC: 6.6 G/DL (ref 6–8.5)
QT INTERVAL: 448 MS
RBC # BLD AUTO: 4.12 10*6/MM3 (ref 3.77–5.28)
SODIUM SERPL-SCNC: 142 MMOL/L (ref 136–145)
STRESS TARGET HR: 135 BPM
TROPONIN T SERPL-MCNC: <0.01 NG/ML (ref 0–0.03)
WBC NRBC COR # BLD: 4.13 10*3/MM3 (ref 3.4–10.8)
WHOLE BLOOD HOLD SPECIMEN: NORMAL
WHOLE BLOOD HOLD SPECIMEN: NORMAL

## 2022-03-18 PROCEDURE — 93971 EXTREMITY STUDY: CPT

## 2022-03-18 PROCEDURE — 83880 ASSAY OF NATRIURETIC PEPTIDE: CPT | Performed by: EMERGENCY MEDICINE

## 2022-03-18 PROCEDURE — 80053 COMPREHEN METABOLIC PANEL: CPT | Performed by: EMERGENCY MEDICINE

## 2022-03-18 PROCEDURE — 93005 ELECTROCARDIOGRAM TRACING: CPT | Performed by: EMERGENCY MEDICINE

## 2022-03-18 PROCEDURE — 93971 EXTREMITY STUDY: CPT | Performed by: SURGERY

## 2022-03-18 PROCEDURE — 99283 EMERGENCY DEPT VISIT LOW MDM: CPT

## 2022-03-18 PROCEDURE — 85025 COMPLETE CBC W/AUTO DIFF WBC: CPT | Performed by: EMERGENCY MEDICINE

## 2022-03-18 PROCEDURE — 84484 ASSAY OF TROPONIN QUANT: CPT | Performed by: EMERGENCY MEDICINE

## 2022-03-18 PROCEDURE — 36415 COLL VENOUS BLD VENIPUNCTURE: CPT | Performed by: EMERGENCY MEDICINE

## 2022-03-18 PROCEDURE — 71045 X-RAY EXAM CHEST 1 VIEW: CPT

## 2022-03-18 RX ORDER — SODIUM CHLORIDE 0.9 % (FLUSH) 0.9 %
10 SYRINGE (ML) INJECTION AS NEEDED
Status: DISCONTINUED | OUTPATIENT
Start: 2022-03-18 | End: 2022-03-18 | Stop reason: HOSPADM

## 2022-03-18 RX ORDER — METHOCARBAMOL 750 MG/1
750 TABLET, FILM COATED ORAL 3 TIMES DAILY PRN
Qty: 12 TABLET | Refills: 0 | Status: SHIPPED | OUTPATIENT
Start: 2022-03-18 | End: 2022-11-02

## 2022-03-18 NOTE — ED NOTES
RLE pain x 2 weeks with increased pain over the past 5 days.  Pt reports fall on that extremity approx 2 weeks ago. Pt also reports R shoulder pain that started this am.  Denies DVT/PE Hx.

## 2022-03-18 NOTE — ED PROVIDER NOTES
Time: 10:24 AM EDT  Arrived by: private car  Chief Complaint: RLE SWELLING   History provided by: pt  History is limited by: N/A     History of Present Illness:  Patient is a 61 y.o. female that presents to the emergency department with moderate RLE SWELLING for the past 2 weeks. The swelling is still present and has been constant. Nothing improves or worsens symptoms.     She c/o SOB since yesterday. She has cough that is chronic and unchanged. She c/o right shoulder pain that does not radiate and is worse with ROM. No CP, fever, vomiting, and diarrhea. Pt does note that she had two tears to the right knee.    No recent surgeries or travel.        History provided by:  Patient    Similar Symptoms Previously: no  Recently seen: Pt was last seen in this ED on 12/18/21 for wound check.       Patient Care Team  Primary Care Provider: Milton Zamarripa APRN    Past Medical History:     No Known Allergies  Past Medical History:   Diagnosis Date   • Allergic    • Anxiety    • Asthma    • Cataracts, bilateral     Left eye worse per patient   • Change in voice    • Cough    • Dry mouth    • GERD (gastroesophageal reflux disease) 08/28/2019   • History of tobacco use    • Hoarseness of voice    • Hypothyroidism 08/28/2019   • Laryngeal candidiasis    • Low back pain      Past Surgical History:   Procedure Laterality Date   • COLONOSCOPY     • ENDOSCOPY N/A 1/11/2022    Procedure: ESOPHAGOGASTRODUODENOSCOPY WITH BX;  Surgeon: Faina Mtz MD;  Location: [facility] ENDOSCOPY;  Service: Gastroenterology;  Laterality: N/A;  GASTRITIS, HIATAL HERNIA   • KNEE ARTHROSCOPY W/ MENISCAL REPAIR     • OTHER SURGICAL HISTORY      uterine ablation   • UPPER GASTROINTESTINAL ENDOSCOPY       Family History   Problem Relation Age of Onset   • Arthritis Mother    • Heart disease Mother    • Diabetes Mother    • Arthritis Father    • Stroke Father    • Heart disease Father    • Diabetes Father    • Cancer Sister    • Malig Hyperthermia Neg  Hx        Home Medications:  Prior to Admission medications    Medication Sig Start Date End Date Taking? Authorizing Provider   acetaminophen (TYLENOL) 500 MG tablet Take 1 tablet by mouth Every 6 (Six) Hours As Needed for Mild Pain . 3/16/22   Janis Hagen MD   Alcohol Swabs (Easy Touch Alcohol Prep Medium) 70 % pads Apply 1 each topically to the appropriate area as directed Daily As Needed (as needed). 2/25/22   Milton Zamarripa APRN   ascorbic acid (VITAMIN C) 500 MG tablet Take 500 mg by mouth Daily.    Bruce Boles MD   Calcium Carbonate-Vitamin D (calcium-vitamin D) 500-200 MG-UNIT tablet per tablet Take 1 tablet by mouth Daily. 5/23/21   Bruce Boles MD   desonide (DesOwen) 0.05 % cream Apply 1 application topically to the appropriate area as directed 2 (Two) Times a Day. 1/14/22   Milton Zamarripa APRN   diclofenac (VOLTAREN) 75 MG EC tablet Take 1 tablet by mouth 2 (Two) Times a Day. 3/16/22   Janis Hagen MD   Dupilumab 200 MG/1.14ML solution prefilled syringe 200 mg. 12/10/21   Bruce Boles MD   escitalopram (LEXAPRO) 20 MG tablet Take 1 tablet by mouth Daily. 8/13/21   Milton Zamarripa APRN   estradiol (MINIVELLE, VIVELLE-DOT) 0.05 MG/24HR patch Place 1 patch on the skin as directed by provider 1 (One) Time Per Week. 10/29/21   Bruce Boles MD   famotidine (PEPCID) 20 MG tablet Take 1 tablet by mouth At Night As Needed for Heartburn. 10/20/21   Iliana Figueroa APRN   fluticasone-salmeterol (ADVAIR HFA) 230-21 MCG/ACT inhaler Inhale 2 puffs 2 (Two) Times a Day for 90 days. 12/9/21 3/9/22  Jacque Dunn APRN   ipratropium-albuterol (DUO-NEB) 0.5-2.5 mg/3 ml nebulizer 3 mL 4 (Four) Times a Day. 5/26/21   Bruce Boles MD   loratadine (CLARITIN) 10 MG tablet Take 1 tablet by mouth Daily. 2/25/22   Milton Zamarripa APRN   methocarbamol (ROBAXIN) 500 MG tablet Take 2 tablets by mouth 4 (Four) Times a Day. 3/16/22   Janis Hagen MD montelukast (SINGULAIR)  "10 MG tablet Take 1 tablet by mouth Every Night. 2/25/22   Milton Zamarripa APRN   multivitamin with minerals tablet tablet Take 1 tablet by mouth Daily.    Bruce Boles MD   neomycin-bacitracin-polymyxin (NEOSPORIN) 5-400-5000 ointment  10/29/21   Bruce Boles MD   PreviDent 5000 Plus 1.1 % cream  10/21/21   Bruce Boles MD   Synthroid 125 MCG tablet Take 1 tablet by mouth Daily. 2/7/22   Milton Zamarripa APRN   terbinafine (lamiSIL) 250 MG tablet Take 1 tablet by mouth Daily. 1/14/22   Milton Zamarripa APRN   tolterodine (DETROL) 2 MG tablet Take 1 tablet by mouth 2 (Two) Times a Day. 11/1/21   Milton Zamarripa APRN   traMADol (ULTRAM) 50 MG tablet Take 50 mg by mouth Every 6 (Six) Hours As Needed. 5/26/21   Bruce Boles MD   Xiidra 5 % ophthalmic solution  12/9/21   Bruce Boles MD        Social History:   Social History     Tobacco Use   • Smoking status: Former Smoker     Packs/day: 1.00     Years: 5.00     Pack years: 5.00     Types: Cigarettes   • Smokeless tobacco: Never Used   Vaping Use   • Vaping Use: Never used   Substance Use Topics   • Alcohol use: Never   • Drug use: Never     Recent travel: no     Review of Systems:  Review of Systems   Constitutional: Negative for chills, diaphoresis and fever.   HENT: Negative for ear discharge and nosebleeds.    Eyes: Negative for photophobia.   Respiratory: Positive for cough (chronic and unchanged) and shortness of breath.    Cardiovascular: Positive for leg swelling (RLE). Negative for chest pain.   Gastrointestinal: Negative for diarrhea, nausea and vomiting.   Genitourinary: Negative for dysuria.   Musculoskeletal: Negative for back pain and neck pain.        Right shoulder pain.    Skin: Negative for rash.   Neurological: Negative for headaches.        Physical Exam:  /72   Pulse 57   Temp 98.1 °F (36.7 °C) (Oral)   Resp 16   Ht 167.6 cm (66\")   Wt 97.7 kg (215 lb 6.2 oz)   SpO2 98%   BMI 34.76 kg/m²     Physical " Exam  Vitals and nursing note reviewed.   Constitutional:       General: She is not in acute distress.     Appearance: Normal appearance.   HENT:      Head: Normocephalic and atraumatic.      Nose: Nose normal.   Eyes:      General: No scleral icterus.  Cardiovascular:      Rate and Rhythm: Normal rate and regular rhythm.      Pulses:           Dorsalis pedis pulses are 2+ on the right side.      Heart sounds: Normal heart sounds.   Pulmonary:      Effort: Pulmonary effort is normal. No respiratory distress.      Breath sounds: Normal breath sounds.   Abdominal:      Palpations: Abdomen is soft.      Tenderness: There is no abdominal tenderness.   Musculoskeletal:         General: Normal range of motion.      Cervical back: Neck supple.      Right lower leg: No edema.      Left lower leg: No edema.      Comments: No palpable edema to right foot and right ankle area.    Skin:     General: Skin is warm and dry.   Neurological:      General: No focal deficit present.      Mental Status: She is alert and oriented to person, place, and time.      Sensory: No sensory deficit.      Motor: No weakness.                Medications in the Emergency Department:  Medications   sodium chloride 0.9 % flush 10 mL (has no administration in time range)        Labs  Lab Results (last 24 hours)     Procedure Component Value Units Date/Time    CBC & Differential [[id number]]  (Normal) Collected: 03/18/22 0947    Specimen: Blood from Arm, Right Updated: 03/18/22 0957    Narrative:      The following orders were created for panel order CBC & Differential.  Procedure                               Abnormality         Status                     ---------                               -----------         ------                     CBC Auto Differential[[id number]]        Normal              Final result                 Please view results for these tests on the individual orders.    Comprehensive Metabolic Panel [[id number]]  (Abnormal)  Collected: 03/18/22 0947    Specimen: Blood from Arm, Right Updated: 03/18/22 1014     Glucose 130 mg/dL      BUN 14 mg/dL      Creatinine 1.01 mg/dL      Sodium 142 mmol/L      Potassium 4.5 mmol/L      Chloride 105 mmol/L      CO2 27.7 mmol/L      Calcium 9.1 mg/dL      Total Protein 6.6 g/dL      Albumin 4.30 g/dL      ALT (SGPT) 18 U/L      AST (SGOT) 17 U/L      Alkaline Phosphatase 106 U/L      Total Bilirubin 0.3 mg/dL      Globulin 2.3 gm/dL      A/G Ratio 1.9 g/dL      BUN/Creatinine Ratio 13.9     Anion Gap 9.3 mmol/L      eGFR 63.5 mL/min/1.73      Comment: National Kidney Foundation and American Society of Nephrology (ASN) Task Force recommended calculation based on the Chronic Kidney Disease Epidemiology Collaboration (CKD-EPI) equation refit without adjustment for race.       Narrative:      GFR Normal >60  Chronic Kidney Disease <60  Kidney Failure <15      BNP [[id number]]  (Normal) Collected: 03/18/22 0947    Specimen: Blood from Arm, Right Updated: 03/18/22 1012     proBNP 59.8 pg/mL     Narrative:      Among patients with dyspnea, NT-proBNP is highly sensitive for the detection of acute congestive heart failure. In addition NT-proBNP of <300 pg/ml effectively rules out acute congestive heart failure with 99% negative predictive value.    Results may be falsely decreased if patient taking Biotin.      Troponin [[id number]]  (Normal) Collected: 03/18/22 0947    Specimen: Blood from Arm, Right Updated: 03/18/22 1013     Troponin T <0.010 ng/mL     Narrative:      Troponin T Reference Range:  <= 0.03 ng/mL-   Negative for AMI  >0.03 ng/mL-     Abnormal for myocardial necrosis.  Clinicians would have to utilize clinical acumen, EKG, Troponin and serial changes to determine if it is an Acute Myocardial Infarction or myocardial injury due to an underlying chronic condition.       Results may be falsely decreased if patient taking Biotin.      CBC Auto Differential [[id number]]  (Normal) Collected: 03/18/22  0947    Specimen: Blood from Arm, Right Updated: 03/18/22 0957     WBC 4.13 10*3/mm3      RBC 4.12 10*6/mm3      Hemoglobin 12.2 g/dL      Hematocrit 36.4 %      MCV 88.3 fL      MCH 29.6 pg      MCHC 33.5 g/dL      RDW 13.1 %      RDW-SD 41.9 fl      MPV 9.4 fL      Platelets 147 10*3/mm3      Neutrophil % 48.7 %      Lymphocyte % 38.5 %      Monocyte % 10.4 %      Eosinophil % 1.5 %      Basophil % 0.7 %      Immature Grans % 0.2 %      Neutrophils, Absolute 2.01 10*3/mm3      Lymphocytes, Absolute 1.59 10*3/mm3      Monocytes, Absolute 0.43 10*3/mm3      Eosinophils, Absolute 0.06 10*3/mm3      Basophils, Absolute 0.03 10*3/mm3      Immature Grans, Absolute 0.01 10*3/mm3      nRBC 0.0 /100 WBC            Imaging:  XR Chest 1 View    Result Date: 3/18/2022  PROCEDURE: XR CHEST 1 VW  COMPARISON: 03/08/2021  INDICATIONS: SOA Triage Protocol  FINDINGS:  Heart size and pulmonary vasculature within normal limits.  Lungs clear.  Costophrenic angles sharp.  No pneumothorax       No active cardiopulmonary disease       YURIDIA DUFFY MD       Electronically Signed and Approved By: YURIDIA DUFFY MD on 3/18/2022 at 10:58               Procedures:  Procedures    Progress  ED Course as of 03/18/22 1311   Fri Mar 18, 2022   1108 EKG interpretation: Normal sinus rhythm, heart rate 59, normal WA and QT intervals, normal QRS duration, left axis deviation, normal ST segments with no acute ischemia. [RP]   1307 Reevaluation: Denies difficulty breathing or chest pain.  When asked where she hurts she puts her hand on her right trapezius muscle.  She is asking for a muscle relaxant for her pain. [RP]      ED Course User Index  [RP] Octavio Nova MD                            Medical Decision Making:  MDM  Number of Diagnoses or Management Options  Myalgia: new and requires workup     Amount and/or Complexity of Data Reviewed  Clinical lab tests: reviewed  Tests in the radiology section of CPT®: reviewed  Tests in the medicine  section of CPT®: reviewed  Independent visualization of images, tracings, or specimens: yes    Risk of Complications, Morbidity, and/or Mortality  Presenting problems: moderate  Management options: low    Patient Progress  Patient progress: stable       Final diagnoses:   Myalgia        Disposition:  ED Disposition     ED Disposition   Discharge    Condition   Stable    Comment   --             Documentation assistance provided by Aurora Giordano acting as scribe for Octavio Nova MD. Information recorded by the scribe was done at my direction and has been verified and validated by me.        Aurora Giordano  03/18/22 1029       Octavio Nova MD  03/18/22 1311

## 2022-04-01 ENCOUNTER — TELEPHONE (OUTPATIENT)
Dept: FAMILY MEDICINE CLINIC | Facility: CLINIC | Age: 62
End: 2022-04-01

## 2022-04-01 DIAGNOSIS — Z01.00 ENCOUNTER FOR VISION SCREENING: Primary | ICD-10-CM

## 2022-04-01 NOTE — TELEPHONE ENCOUNTER
Caller: Judy Ramey    Relationship to patient: Self    Best call back number: [phone]    Patient is needing: PATIENT CALLED STATING SHE IS NEEDING A NEW REFERRAL TO BE SENT TO [facility] FOR DR RUFINO HARTMAN. THE PATIENT WOULD LIKE A CALL BACK TO LET HER KNOW THIS REFERRAL HAS BEEN SENT TO THE OFFICE PLEASE ADVISE THANK YOU.           [address]   [address] [place] [address]    PHONE #: [phone]

## 2022-04-08 ENCOUNTER — OFFICE VISIT (OUTPATIENT)
Dept: PULMONOLOGY | Facility: CLINIC | Age: 62
End: 2022-04-08

## 2022-04-08 VITALS
SYSTOLIC BLOOD PRESSURE: 127 MMHG | RESPIRATION RATE: 18 BRPM | WEIGHT: 215 LBS | OXYGEN SATURATION: 93 % | HEIGHT: 66 IN | HEART RATE: 67 BPM | TEMPERATURE: 98.9 F | BODY MASS INDEX: 34.55 KG/M2 | DIASTOLIC BLOOD PRESSURE: 81 MMHG

## 2022-04-08 DIAGNOSIS — J45.50 SEVERE PERSISTENT ASTHMA, UNSPECIFIED WHETHER COMPLICATED: Primary | ICD-10-CM

## 2022-04-08 PROCEDURE — 99213 OFFICE O/P EST LOW 20 MIN: CPT | Performed by: INTERNAL MEDICINE

## 2022-04-08 RX ORDER — PANTOPRAZOLE SODIUM 40 MG/1
TABLET, DELAYED RELEASE ORAL
COMMUNITY
Start: 2022-02-24 | End: 2022-07-05 | Stop reason: SDUPTHER

## 2022-04-08 RX ORDER — LORATADINE 10 MG/1
10 TABLET ORAL DAILY
Qty: 90 TABLET | Refills: 11 | Status: SHIPPED | OUTPATIENT
Start: 2022-04-08

## 2022-04-08 RX ORDER — MONTELUKAST SODIUM 10 MG/1
10 TABLET ORAL NIGHTLY
Qty: 90 TABLET | Refills: 11 | Status: SHIPPED | OUTPATIENT
Start: 2022-04-08

## 2022-04-08 NOTE — ASSESSMENT & PLAN NOTE
] Continue on Advair  Continue Spiriva  Continue albuterol  Continue dupilumab  Continue Singulair  Continue Claritin

## 2022-04-08 NOTE — PROGRESS NOTES
"Chief Complaint  Follow-up, Asthma, and Med Refill    Subjective          Judy Ramey presents to [facility]  History of Present Illness  61-year-old female with asthma here for follow-up  Patient doing well  Breathing much improved on biologic agents  Rescue inhaler use infrequently  No nocturnal symptoms  Objective   Vital Signs:   /81 (BP Location: Right arm, Patient Position: Sitting, Cuff Size: Large Adult)   Pulse 67   Temp 98.9 °F (37.2 °C)   Resp 18   Ht 167.6 cm (66\")   Wt 97.5 kg (215 lb)   SpO2 93%   BMI 34.70 kg/m²            Physical Exam   Vital Signs Reviewed  General WDWN, Alert, NAD.    HEENT:  PERRL, EOMI.  OP, nares clear, no sinus tenderness  Neck:  Supple, no JVD, no thyromegaly  Lymph: no axillary, cervical, supraclavicular lymphadenopathy noted bilaterally  Chest:  good aeration, clear to auscultation bilaterally, tympanic to percussion bilaterally, no work of breathing noted  CV: RRR, no MGR,  EXT:  no clubbing, no cyanosis, no edema, no joint tenderness  Neuro:  A&Ox3, CN grossly intact, no focal deficits.  Skin: No rashes or lesions noted    Result Review :                 Assessment and Plan    Diagnoses and all orders for this visit:    1. Severe persistent asthma, unspecified whether complicated (Primary)  Assessment & Plan:  ] Continue on Advair  Continue Spiriva  Continue albuterol  Continue dupilumab  Continue Singulair  Continue Claritin        Other orders  -     montelukast (SINGULAIR) 10 MG tablet; Take 1 tablet by mouth Every Night.  Dispense: 90 tablet; Refill: 11  -     loratadine (CLARITIN) 10 MG tablet; Take 1 tablet by mouth Daily.  Dispense: 90 tablet; Refill: 11      Follow Up   Return in about 4 months (around 8/8/2022).  Patient was given instructions and counseling regarding her condition or for health maintenance advice. Please see specific information pulled into the AVS if appropriate.       "

## 2022-04-18 ENCOUNTER — TELEPHONE (OUTPATIENT)
Dept: FAMILY MEDICINE CLINIC | Facility: CLINIC | Age: 62
End: 2022-04-18

## 2022-04-18 ENCOUNTER — OFFICE VISIT (OUTPATIENT)
Dept: FAMILY MEDICINE CLINIC | Facility: CLINIC | Age: 62
End: 2022-04-18

## 2022-04-18 VITALS
HEART RATE: 87 BPM | SYSTOLIC BLOOD PRESSURE: 126 MMHG | BODY MASS INDEX: 35.39 KG/M2 | HEIGHT: 66 IN | OXYGEN SATURATION: 97 % | DIASTOLIC BLOOD PRESSURE: 74 MMHG | WEIGHT: 220.2 LBS | TEMPERATURE: 97.5 F

## 2022-04-18 DIAGNOSIS — M54.41 CHRONIC RIGHT-SIDED LOW BACK PAIN WITH RIGHT-SIDED SCIATICA: ICD-10-CM

## 2022-04-18 DIAGNOSIS — F41.9 ANXIETY: ICD-10-CM

## 2022-04-18 DIAGNOSIS — Z12.31 ENCOUNTER FOR SCREENING MAMMOGRAM FOR MALIGNANT NEOPLASM OF BREAST: Primary | ICD-10-CM

## 2022-04-18 DIAGNOSIS — F32.0 MAJOR DEPRESSIVE DISORDER, SINGLE EPISODE, MILD: ICD-10-CM

## 2022-04-18 DIAGNOSIS — G89.29 CHRONIC RIGHT-SIDED LOW BACK PAIN WITH RIGHT-SIDED SCIATICA: ICD-10-CM

## 2022-04-18 DIAGNOSIS — N39.3 STRESS INCONTINENCE: ICD-10-CM

## 2022-04-18 PROCEDURE — 99214 OFFICE O/P EST MOD 30 MIN: CPT | Performed by: NURSE PRACTITIONER

## 2022-04-18 RX ORDER — AMITRIPTYLINE HYDROCHLORIDE 25 MG/1
25 TABLET, FILM COATED ORAL NIGHTLY PRN
Qty: 90 TABLET | Refills: 0 | Status: SHIPPED | OUTPATIENT
Start: 2022-04-18

## 2022-04-18 RX ORDER — TOLTERODINE TARTRATE 2 MG/1
2 TABLET, EXTENDED RELEASE ORAL 2 TIMES DAILY
Qty: 180 TABLET | Refills: 1 | Status: SHIPPED | OUTPATIENT
Start: 2022-04-18 | End: 2022-04-20 | Stop reason: ALTCHOICE

## 2022-04-18 RX ORDER — ESCITALOPRAM OXALATE 10 MG/1
10 TABLET ORAL DAILY
Qty: 90 TABLET | Refills: 1 | Status: SHIPPED | OUTPATIENT
Start: 2022-04-18 | End: 2022-08-09 | Stop reason: SDUPTHER

## 2022-04-18 NOTE — TELEPHONE ENCOUNTER
PT WAS SEEN TODAY BY SALINA. PT PICKED UP HER MEDICATIONS EXCEPT THE MEDICATION FOR THE SCIATIC NERVE. PHARMACY DID NOT HAVE PRESCRIPTION FOR THIS MEDICATION.

## 2022-04-18 NOTE — PROGRESS NOTES
"Chief Complaint  Low back pain with sciatica on right side    Subjective          Judy Ramey presents to [facility]  Presents to the office today to discuss her low back pain with right-sided sciatica.  Patient states that the pain is radiating down to her knee.  She did follow-up with her orthopedic doctor regards to the knee pain and he felt that the knee pain was secondary to the sciatica.  Patient does take diclofenac on a routine basis for low back pain.  I did discuss trying amitriptyline for the sciatic pain to see if this would reduce her flareups.  Patient also states that she is needing refills of her Detrol and Lexapro.  She does state that she wants to reduce her Lexapro to 10 mg daily.  Patient's blood pressure over the days 126/74.  She denies any chest pain shortness breath palpitations this time.      Objective   Vital Signs:   /74 (BP Location: Right arm, Patient Position: Sitting, Cuff Size: Adult)   Pulse 87   Temp 97.5 °F (36.4 °C) (Temporal)   Ht 167.6 cm (66\")   Wt 99.9 kg (220 lb 3.2 oz)   SpO2 97%   BMI 35.54 kg/m²     BMI is above normal parameters. Recommendations: nutrition counseling/recommendations       Physical Exam  Vitals reviewed.   Constitutional:       Appearance: Normal appearance.   Cardiovascular:      Rate and Rhythm: Normal rate and regular rhythm.      Heart sounds: Normal heart sounds, S1 normal and S2 normal. No murmur heard.  Pulmonary:      Effort: Pulmonary effort is normal. No respiratory distress.      Breath sounds: Normal breath sounds.   Musculoskeletal:      Lumbar back: Tenderness present.        Back:    Skin:     General: Skin is warm and dry.   Neurological:      Mental Status: She is alert and oriented to person, place, and time.   Psychiatric:         Attention and Perception: Attention normal.         Mood and Affect: Mood normal.         Behavior: Behavior normal.        Result Review :            "   Assessment and Plan    Diagnoses and all orders for this visit:    1. Encounter for screening mammogram for malignant neoplasm of breast (Primary)  -     Mammo Screening Digital Tomosynthesis Bilateral With CAD; Future    2. Stress incontinence  -     tolterodine (DETROL) 2 MG tablet; Take 1 tablet by mouth 2 (Two) Times a Day.  Dispense: 180 tablet; Refill: 1    3. Chronic right-sided low back pain with right-sided sciatica  -     amitriptyline (ELAVIL) 25 MG tablet; Take 1 tablet by mouth At Night As Needed for Sleep.  Dispense: 90 tablet; Refill: 0    4. Anxiety  -     escitalopram (LEXAPRO) 10 MG tablet; Take 1 tablet by mouth Daily.  Dispense: 90 tablet; Refill: 1    5. Major depressive disorder, single episode, mild (HCC)  -     escitalopram (LEXAPRO) 10 MG tablet; Take 1 tablet by mouth Daily.  Dispense: 90 tablet; Refill: 1        Follow Up   Return if symptoms worsen or fail to improve.  Patient was given instructions and counseling regarding her condition or for health maintenance advice. Please see specific information pulled into the AVS if appropriate.

## 2022-04-20 ENCOUNTER — TELEPHONE (OUTPATIENT)
Dept: FAMILY MEDICINE CLINIC | Facility: CLINIC | Age: 62
End: 2022-04-20

## 2022-04-20 RX ORDER — TOLTERODINE 2 MG/1
2 CAPSULE, EXTENDED RELEASE ORAL DAILY
Qty: 90 CAPSULE | Refills: 1 | Status: SHIPPED | OUTPATIENT
Start: 2022-04-20 | End: 2022-08-09 | Stop reason: SDUPTHER

## 2022-04-20 NOTE — TELEPHONE ENCOUNTER
Caller: Judy Ramey    Relationship to patient: Self    Best call back number: [phone] OKAY TO LEAVE MESSAGE ON PHONE    Patient is needing: PATIENT CALLED IN AND SAID THAT [facility] DOES NOT HAVE THE FORM OF PILL FOR tolterodine (DETROL) 2 MG tablet THAT WAS PRESCRIBED AND IS ASKING FOR A PRESCRIPTION FOR CAPSULES BECAUSE THEY CARRY THAT. PLEASE CALL AND ADVISE.    AUGIE [place] [facility] -  [place], [place] - [address] - [phone] [facility] [phone]   [phone]

## 2022-05-26 ENCOUNTER — TELEPHONE (OUTPATIENT)
Dept: PULMONOLOGY | Facility: CLINIC | Age: 62
End: 2022-05-26

## 2022-05-26 DIAGNOSIS — R05.9 COUGH: ICD-10-CM

## 2022-05-26 DIAGNOSIS — J45.50 SEVERE PERSISTENT ASTHMA, UNSPECIFIED WHETHER COMPLICATED: Primary | ICD-10-CM

## 2022-05-26 DIAGNOSIS — K21.9 GASTROESOPHAGEAL REFLUX DISEASE, UNSPECIFIED WHETHER ESOPHAGITIS PRESENT: ICD-10-CM

## 2022-05-26 RX ORDER — FAMOTIDINE 20 MG/1
20 TABLET, FILM COATED ORAL NIGHTLY PRN
Qty: 90 TABLET | Refills: 1 | Status: SHIPPED | OUTPATIENT
Start: 2022-05-26 | End: 2022-08-09 | Stop reason: SDUPTHER

## 2022-05-26 NOTE — TELEPHONE ENCOUNTER
Spoke to pt and she states that she's been coughing a lot and states that she has not had a Chest CT done in about 3 years and would like a follow up CT. Are you able to assist with this? Please advise, thank you!

## 2022-05-26 NOTE — TELEPHONE ENCOUNTER
Patient is wanting to see if  will put her in an order for a chest ct.Please contact at [phone]. Thank you.

## 2022-06-01 NOTE — TELEPHONE ENCOUNTER
----- Message from FERNY Prakash sent at 1/12/2022 10:33 AM EST -----  Gastric biopsies normal.  Please schedule for f/u.  
Spoke to pt and informed of Agueda ISAACS note. Pt verified understanding. F/U scheduled for 05/25/2022 @ 1030. Apt reminder mailed.   
none

## 2022-06-21 ENCOUNTER — APPOINTMENT (OUTPATIENT)
Dept: MAMMOGRAPHY | Facility: HOSPITAL | Age: 62
End: 2022-06-21

## 2022-06-22 ENCOUNTER — HOSPITAL ENCOUNTER (OUTPATIENT)
Dept: CT IMAGING | Facility: HOSPITAL | Age: 62
Discharge: HOME | End: 2022-06-22
Admitting: NURSE PRACTITIONER

## 2022-06-22 DIAGNOSIS — J45.50 SEVERE PERSISTENT ASTHMA, UNSPECIFIED WHETHER COMPLICATED: ICD-10-CM

## 2022-06-22 DIAGNOSIS — R05.9 COUGH: ICD-10-CM

## 2022-06-22 PROCEDURE — 71250 CT THORAX DX C-: CPT

## 2022-06-29 ENCOUNTER — HOSPITAL ENCOUNTER (OUTPATIENT)
Dept: MAMMOGRAPHY | Facility: HOSPITAL | Age: 62
Discharge: HOME | End: 2022-06-29
Admitting: NURSE PRACTITIONER

## 2022-06-29 DIAGNOSIS — Z12.31 ENCOUNTER FOR SCREENING MAMMOGRAM FOR MALIGNANT NEOPLASM OF BREAST: ICD-10-CM

## 2022-06-29 PROCEDURE — 77067 SCR MAMMO BI INCL CAD: CPT

## 2022-06-29 PROCEDURE — 77063 BREAST TOMOSYNTHESIS BI: CPT

## 2022-07-05 RX ORDER — PANTOPRAZOLE SODIUM 40 MG/1
40 TABLET, DELAYED RELEASE ORAL DAILY
Qty: 90 TABLET | Refills: 1 | Status: SHIPPED | OUTPATIENT
Start: 2022-07-05 | End: 2022-11-02

## 2022-07-05 NOTE — TELEPHONE ENCOUNTER
Caller: Judy Ramey    Relationship: Self    Best call back number: [phone]     Requested Prescriptions:   Requested Prescriptions     Pending Prescriptions Disp Refills   • pantoprazole (PROTONIX) 40 MG EC tablet          Pharmacy where request should be sent: [facility] [place] [facility] -  [place], [place] - [address] [facility] - [phone] [facility] [phone] FX     Additional details provided by patient:     Does the patient have less than a 3 day supply:  [x] Yes  [] No    Tatyana Watkins Rep   07/05/22 10:03 EDT

## 2022-07-05 NOTE — TELEPHONE ENCOUNTER
Caller: Judy Rmaey    Relationship: Self    Best call back number: [phone]     What is the medical concern/diagnosis: FOLLOW UPS AND MED REFILL FOR RESPIRATORY ISSUES    What specialty or service is being requested:PULMONARY    What is the provider, practice or medical service name: DR RACHEL LEACH

## 2022-08-09 ENCOUNTER — TELEPHONE (OUTPATIENT)
Dept: FAMILY MEDICINE CLINIC | Facility: CLINIC | Age: 62
End: 2022-08-09

## 2022-08-09 DIAGNOSIS — F41.9 ANXIETY: ICD-10-CM

## 2022-08-09 DIAGNOSIS — K21.9 GASTROESOPHAGEAL REFLUX DISEASE, UNSPECIFIED WHETHER ESOPHAGITIS PRESENT: ICD-10-CM

## 2022-08-09 DIAGNOSIS — F32.0 MAJOR DEPRESSIVE DISORDER, SINGLE EPISODE, MILD: ICD-10-CM

## 2022-08-09 DIAGNOSIS — M79.604 PAIN OF RIGHT LOWER EXTREMITY: ICD-10-CM

## 2022-08-09 RX ORDER — DICLOFENAC SODIUM 75 MG/1
75 TABLET, DELAYED RELEASE ORAL 2 TIMES DAILY
Qty: 60 TABLET | Refills: 0 | Status: SHIPPED | OUTPATIENT
Start: 2022-08-09 | End: 2023-01-17 | Stop reason: SDUPTHER

## 2022-08-09 RX ORDER — TOLTERODINE 2 MG/1
2 CAPSULE, EXTENDED RELEASE ORAL DAILY
Qty: 90 CAPSULE | Refills: 1 | Status: CANCELLED | OUTPATIENT
Start: 2022-08-09

## 2022-08-09 RX ORDER — DICLOFENAC SODIUM 75 MG/1
75 TABLET, DELAYED RELEASE ORAL 2 TIMES DAILY
Qty: 60 TABLET | Refills: 0 | Status: CANCELLED | OUTPATIENT
Start: 2022-08-09

## 2022-08-09 RX ORDER — LEVOTHYROXINE SODIUM 125 MCG
125 TABLET ORAL DAILY
Qty: 90 TABLET | Refills: 1 | Status: SHIPPED | OUTPATIENT
Start: 2022-08-09 | End: 2023-01-17 | Stop reason: SDUPTHER

## 2022-08-09 RX ORDER — FAMOTIDINE 20 MG/1
20 TABLET, FILM COATED ORAL NIGHTLY PRN
Qty: 90 TABLET | Refills: 1 | Status: SHIPPED | OUTPATIENT
Start: 2022-08-09

## 2022-08-09 RX ORDER — ESCITALOPRAM OXALATE 10 MG/1
10 TABLET ORAL DAILY
Qty: 90 TABLET | Refills: 1 | Status: SHIPPED | OUTPATIENT
Start: 2022-08-09

## 2022-08-09 RX ORDER — TOLTERODINE 2 MG/1
2 CAPSULE, EXTENDED RELEASE ORAL DAILY
Qty: 90 CAPSULE | Refills: 1 | Status: SHIPPED | OUTPATIENT
Start: 2022-08-09 | End: 2022-11-02

## 2022-09-19 ENCOUNTER — OFFICE VISIT (OUTPATIENT)
Dept: PULMONOLOGY | Facility: CLINIC | Age: 62
End: 2022-09-19

## 2022-09-19 VITALS
SYSTOLIC BLOOD PRESSURE: 134 MMHG | TEMPERATURE: 98.2 F | HEIGHT: 66 IN | WEIGHT: 212 LBS | RESPIRATION RATE: 19 BRPM | DIASTOLIC BLOOD PRESSURE: 73 MMHG | BODY MASS INDEX: 34.07 KG/M2 | OXYGEN SATURATION: 97 % | HEART RATE: 58 BPM

## 2022-09-19 DIAGNOSIS — K21.9 GASTROESOPHAGEAL REFLUX DISEASE WITHOUT ESOPHAGITIS: ICD-10-CM

## 2022-09-19 DIAGNOSIS — G47.33 OBSTRUCTIVE SLEEP APNEA SYNDROME: ICD-10-CM

## 2022-09-19 DIAGNOSIS — J30.2 SEASONAL ALLERGIES: ICD-10-CM

## 2022-09-19 DIAGNOSIS — J32.0 CHRONIC MAXILLARY SINUSITIS: Primary | ICD-10-CM

## 2022-09-19 DIAGNOSIS — J45.50 SEVERE PERSISTENT ASTHMA WITHOUT COMPLICATION: ICD-10-CM

## 2022-09-19 PROCEDURE — 99214 OFFICE O/P EST MOD 30 MIN: CPT | Performed by: INTERNAL MEDICINE

## 2022-09-19 RX ORDER — ASCORBIC ACID 500 MG
500 TABLET ORAL DAILY
Qty: 90 TABLET | Refills: 11 | Status: SHIPPED | OUTPATIENT
Start: 2022-09-19

## 2022-09-19 RX ORDER — ALBUTEROL SULFATE 90 UG/1
2 AEROSOL, METERED RESPIRATORY (INHALATION) EVERY 4 HOURS PRN
Qty: 108 G | Refills: 11 | Status: SHIPPED | OUTPATIENT
Start: 2022-09-19 | End: 2022-09-19 | Stop reason: SDUPTHER

## 2022-09-19 RX ORDER — ALBUTEROL SULFATE 90 UG/1
2 AEROSOL, METERED RESPIRATORY (INHALATION) EVERY 4 HOURS PRN
COMMUNITY
End: 2022-09-19 | Stop reason: SDUPTHER

## 2022-09-19 RX ORDER — PANTOPRAZOLE SODIUM 40 MG/1
40 TABLET, DELAYED RELEASE ORAL DAILY
Qty: 90 TABLET | Refills: 4 | Status: SHIPPED | OUTPATIENT
Start: 2022-09-19

## 2022-09-19 RX ORDER — FLUTICASONE PROPIONATE AND SALMETEROL XINAFOATE 230; 21 UG/1; UG/1
2 AEROSOL, METERED RESPIRATORY (INHALATION)
Qty: 3 EACH | Refills: 4 | Status: SHIPPED | OUTPATIENT
Start: 2022-09-19 | End: 2023-02-09 | Stop reason: SDUPTHER

## 2022-09-19 RX ORDER — ALBUTEROL SULFATE 90 UG/1
2 AEROSOL, METERED RESPIRATORY (INHALATION) EVERY 4 HOURS PRN
Qty: 54 G | Refills: 4 | Status: SHIPPED | OUTPATIENT
Start: 2022-09-19 | End: 2023-03-02 | Stop reason: SDUPTHER

## 2022-09-19 NOTE — ASSESSMENT & PLAN NOTE
Has been seen by Dr. Roper in the past    We will make referral for patient to see Dr. Stuart at the request of the patient

## 2022-09-19 NOTE — ASSESSMENT & PLAN NOTE
Asthma is currently well controlled at this time    Continue Dupixent injections    Continue Singulair and Claritin    Continue Advair and Spiriva    Continue as needed albuterol

## 2022-09-19 NOTE — ASSESSMENT & PLAN NOTE
Patient with intermittent use of CPAP machine explained her that this can worsen reflux which can in turn worsen her cough

## 2022-09-19 NOTE — PROGRESS NOTES
"Chief Complaint  Sleep Apnea, Cough, and Follow-up (4 Month )    Subjective        Judy Ramey presents to [facility]  History of Present Illness  62-year-old female with asthma here for follow-up  Still with cough but feels that this is likely related to her reflux  Since being on the Dupixent her breathing has improved significantly  Uses rescue inhaler very infrequently  Complaining of significant nasal congestion  Still has significantly swollen nasal turbinates  Voice hoarseness has improved  Cough is dry nonproductive  Objective   Vital Signs:  /73 (BP Location: Right arm, Patient Position: Sitting, Cuff Size: Large Adult)   Pulse 58   Temp 98.2 °F (36.8 °C) (Temporal)   Resp 19   Ht 167.6 cm (66\")   Wt 96.2 kg (212 lb)   SpO2 97% Comment: room air  BMI 34.22 kg/m²   Estimated body mass index is 34.22 kg/m² as calculated from the following:    Height as of this encounter: 167.6 cm (66\").    Weight as of this encounter: 96.2 kg (212 lb).          Physical Exam   Vital Signs Reviewed  General WDWN, Alert, NAD.    HEENT:  PERRL, EOMI.  OP, nares clear, no sinus tenderness significantly swollen turbinates bilaterally  Neck:  Supple, no JVD, no thyromegaly  Lymph: no axillary, cervical, supraclavicular lymphadenopathy noted bilaterally  Chest:  good aeration, clear to auscultation bilaterally, tympanic to percussion bilaterally, no work of breathing noted  CV: RRR, no MGR, pulses 2+, equal.  Abd:  Soft, NT, ND, + BS, no HSM  EXT:  no clubbing, no cyanosis, no edema, no joint tenderness  Neuro:  A&Ox3, CN grossly intact, no focal deficits.  Skin: No rashes or lesions noted  Result Review :                Assessment and Plan   Diagnoses and all orders for this visit:    1. Chronic maxillary sinusitis (Primary)  Assessment & Plan:  Has been seen by Dr. Roper in the past    We will make referral for patient to see Dr. Stuart at the request of the " patient    Orders:  -     Ambulatory Referral to ENT (Otolaryngology)    2. Gastroesophageal reflux disease without esophagitis  Assessment & Plan:  Suspect this is contributed to the patient's cough we will start patient on Protonix 40 mg daily      3. Obstructive sleep apnea syndrome  Assessment & Plan:  Patient with intermittent use of CPAP machine explained her that this can worsen reflux which can in turn worsen her cough      4. Seasonal allergies  Assessment & Plan:  Continue Singulair    Continue Claritin    Continue Dymista      5. Severe persistent asthma without complication  Assessment & Plan:  Asthma is currently well controlled at this time    Continue Dupixent injections    Continue Singulair and Claritin    Continue Advair and Spiriva    Continue as needed albuterol      Other orders  -     Discontinue: albuterol sulfate HFA (ProAir HFA) 108 (90 Base) MCG/ACT inhaler; Inhale 2 puffs Every 4 (Four) Hours As Needed for Wheezing.  Dispense: 108 g; Refill: 11  -     vitamin C (ASCORBIC ACID) 500 MG tablet; Take 1 tablet by mouth Daily.  Dispense: 90 tablet; Refill: 11  -     pantoprazole (Protonix) 40 MG EC tablet; Take 1 tablet by mouth Daily.  Dispense: 90 tablet; Refill: 4  -     albuterol sulfate HFA (ProAir HFA) 108 (90 Base) MCG/ACT inhaler; Inhale 2 puffs Every 4 (Four) Hours As Needed for Wheezing.  Dispense: 54 g; Refill: 4  -     fluticasone-salmeterol (Advair HFA) 230-21 MCG/ACT inhaler; Inhale 2 puffs 2 (Two) Times a Day.  Dispense: 3 each; Refill: 4           Follow Up   Return in about 4 months (around 1/19/2023).  Patient was given instructions and counseling regarding her condition or for health maintenance advice. Please see specific information pulled into the AVS if appropriate.

## 2022-10-13 ENCOUNTER — TELEPHONE (OUTPATIENT)
Dept: FAMILY MEDICINE CLINIC | Facility: CLINIC | Age: 62
End: 2022-10-13

## 2022-10-13 ENCOUNTER — OFFICE VISIT (OUTPATIENT)
Dept: OTOLARYNGOLOGY | Facility: CLINIC | Age: 62
End: 2022-10-13

## 2022-10-13 VITALS — BODY MASS INDEX: 34.62 KG/M2 | TEMPERATURE: 97.8 F | WEIGHT: 215.4 LBS | HEIGHT: 66 IN

## 2022-10-13 DIAGNOSIS — H90.3 SENSORINEURAL HEARING LOSS (SNHL) OF BOTH EARS: ICD-10-CM

## 2022-10-13 DIAGNOSIS — H91.90 HEARING LOSS, UNSPECIFIED HEARING LOSS TYPE, UNSPECIFIED LATERALITY: Primary | ICD-10-CM

## 2022-10-13 DIAGNOSIS — K21.9 GASTROESOPHAGEAL REFLUX DISEASE, UNSPECIFIED WHETHER ESOPHAGITIS PRESENT: ICD-10-CM

## 2022-10-13 DIAGNOSIS — J34.89 NASAL OBSTRUCTION: Primary | ICD-10-CM

## 2022-10-13 DIAGNOSIS — J30.9 ALLERGIC RHINITIS, UNSPECIFIED SEASONALITY, UNSPECIFIED TRIGGER: ICD-10-CM

## 2022-10-13 PROCEDURE — 99204 OFFICE O/P NEW MOD 45 MIN: CPT | Performed by: OTOLARYNGOLOGY

## 2022-10-13 NOTE — TELEPHONE ENCOUNTER
Caller: Judy Ramey    Relationship: Self    Best call back number: [phone]    What is the medical concern/diagnosis: HEARING PROBLEMS     What specialty or service is being requested: HEARING SPECIALIST     What is the provider, practice or medical service name: [facility]     Any additional details: PATIENT WENT TO ENT TODAY AND THEY RECOMMENDED SHE GO TO A HEARING SPECIALIST

## 2022-10-13 NOTE — PROGRESS NOTES
Patient Name: Judy Ramey   Visit Date: 10/13/2022   Patient ID: [id number]  Provider: Robert Sharpe MD    Sex: female  Location: [facility]   YOB: 1960  Location Address: [address] [place] [address], [address],?[place]?[id number]    Primary Care Provider Marilou MiltonFERNY  Location Phone: [phone]    Referring Provider: [facility]*        Chief Complaint  Follow-up (Chronic maxillary sinusitis )    Subjective    History of Present Illness  Judy Ramey is a 62 y.o. female who presents to [facility] today as a consult from [facility]*.    She presents to the clinic today for evaluation of nasal obstruction, allergic rhinitis, GERD, and intermittent voice hoarseness.  She has had these issues for quite some time, and currently uses Dymista, but is not doing any kind of nasal rinses.  She notes that she has intermittent nasal obstruction on both sides.  Denies any nasal trauma history.  She does have voice hoarseness, and was previously seen by Dr. Roper several years ago.  At that time scope exam showed a candidal infection which was cleared at the follow-up visit.  She thinks her voice is more clear today, she denies any other symptoms such as throat pain or difficulty with swallowing.    She informs me that her hearing does not seem to be as good as it used to be, and she would like to have her hearing tested.  She denies any ear disease or ear symptoms.  Notes that she does have intermittent ear ringing.    Past Medical History:   Diagnosis Date   • Allergic    • Anxiety    • Asthma    • Cataracts, bilateral     Left eye worse per patient   • Change in voice    • Cough    • Dry mouth    • GERD (gastroesophageal reflux disease) 08/28/2019   • History of tobacco use    • Hoarseness of voice    • Hypothyroidism 08/28/2019   • Laryngeal candidiasis    • Low back pain        Past Surgical History:   Procedure  Laterality Date   • COLONOSCOPY     • ENDOSCOPY N/A 1/11/2022    Procedure: ESOPHAGOGASTRODUODENOSCOPY WITH BX;  Surgeon: Faina Mtz MD;  Location: [facility] ENDOSCOPY;  Service: Gastroenterology;  Laterality: N/A;  GASTRITIS, HIATAL HERNIA   • KNEE ARTHROSCOPY W/ MENISCAL REPAIR     • OTHER SURGICAL HISTORY      uterine ablation   • UPPER GASTROINTESTINAL ENDOSCOPY           Current Outpatient Medications:   •  albuterol sulfate HFA (ProAir HFA) 108 (90 Base) MCG/ACT inhaler, Inhale 2 puffs Every 4 (Four) Hours As Needed for Wheezing., Disp: 54 g, Rfl: 4  •  Alcohol Swabs (Easy Touch Alcohol Prep Medium) 70 % pads, Apply 1 each topically to the appropriate area as directed Daily As Needed (as needed)., Disp: 100 each, Rfl: 2  •  ascorbic acid (VITAMIN C) 500 MG tablet, Take 500 mg by mouth Daily., Disp: , Rfl:   •  Calcium Carbonate-Vitamin D (calcium-vitamin D) 500-200 MG-UNIT tablet per tablet, Take 1 tablet by mouth Daily., Disp: , Rfl:   •  desonide (DesOwen) 0.05 % cream, Apply 1 application topically to the appropriate area as directed 2 (Two) Times a Day., Disp: 15 g, Rfl: 0  •  diclofenac (VOLTAREN) 75 MG EC tablet, Take 1 tablet by mouth 2 (Two) Times a Day., Disp: 60 tablet, Rfl: 0  •  Dupilumab 200 MG/1.14ML solution prefilled syringe, 200 mg., Disp: , Rfl:   •  escitalopram (LEXAPRO) 10 MG tablet, Take 1 tablet by mouth Daily., Disp: 90 tablet, Rfl: 1  •  estradiol (MINIVELLE, VIVELLE-DOT) 0.05 MG/24HR patch, Place 1 patch on the skin as directed by provider 1 (One) Time Per Week., Disp: , Rfl:   •  famotidine (PEPCID) 20 MG tablet, Take 1 tablet by mouth At Night As Needed for Heartburn., Disp: 90 tablet, Rfl: 1  •  fluticasone-salmeterol (Advair HFA) 230-21 MCG/ACT inhaler, Inhale 2 puffs 2 (Two) Times a Day., Disp: 3 each, Rfl: 4  •  ipratropium-albuterol (DUO-NEB) 0.5-2.5 mg/3 ml nebulizer, 3 mL 4 (Four) Times a Day., Disp: , Rfl:   •  loratadine (CLARITIN) 10 MG tablet, Take 1 tablet by  mouth Daily., Disp: 90 tablet, Rfl: 11  •  montelukast (SINGULAIR) 10 MG tablet, Take 1 tablet by mouth Every Night., Disp: 90 tablet, Rfl: 11  •  multivitamin with minerals tablet tablet, Take 1 tablet by mouth Daily., Disp: , Rfl:   •  pantoprazole (PROTONIX) 40 MG EC tablet, Take 1 tablet by mouth Daily., Disp: 90 tablet, Rfl: 1  •  PreviDent 5000 Plus 1.1 % cream, , Disp: , Rfl:   •  Synthroid 125 MCG tablet, Take 1 tablet by mouth Daily., Disp: 90 tablet, Rfl: 1  •  Xiidra 5 % ophthalmic solution, , Disp: , Rfl:   •  acetaminophen (TYLENOL) 500 MG tablet, Take 1 tablet by mouth Every 6 (Six) Hours As Needed for Mild Pain ., Disp: 30 tablet, Rfl: 0  •  amitriptyline (ELAVIL) 25 MG tablet, Take 1 tablet by mouth At Night As Needed for Sleep., Disp: 90 tablet, Rfl: 0  •  fluticasone-salmeterol (ADVAIR HFA) 230-21 MCG/ACT inhaler, Inhale 2 puffs 2 (Two) Times a Day for 90 days., Disp: 3 each, Rfl: 3  •  methocarbamol (ROBAXIN) 500 MG tablet, Take 2 tablets by mouth 4 (Four) Times a Day., Disp: 40 tablet, Rfl: 0  •  methocarbamol (ROBAXIN) 750 MG tablet, Take 1 tablet by mouth 3 (Three) Times a Day As Needed for Muscle Spasms., Disp: 12 tablet, Rfl: 0  •  neomycin-bacitracin-polymyxin (NEOSPORIN) 5-400-5000 ointment, , Disp: , Rfl:   •  pantoprazole (Protonix) 40 MG EC tablet, Take 1 tablet by mouth Daily., Disp: 90 tablet, Rfl: 4  •  tolterodine LA (Detrol LA) 2 MG 24 hr capsule, Take 1 capsule by mouth Daily., Disp: 90 capsule, Rfl: 1  •  traMADol (ULTRAM) 50 MG tablet, Take 50 mg by mouth Every 6 (Six) Hours As Needed., Disp: , Rfl:   •  vitamin C (ASCORBIC ACID) 500 MG tablet, Take 1 tablet by mouth Daily., Disp: 90 tablet, Rfl: 11     No Known Allergies    Family History   Problem Relation Age of Onset   • Arthritis Mother    • Heart disease Mother    • Diabetes Mother    • Dementia Mother    • Arthritis Father    • Stroke Father    • Heart disease Father    • Diabetes Father    • Cancer Sister    • Malreji  "Hyperthermia Neg Hx         Social History     Social History Narrative    Lives with spouse       Objective     Vital Signs:   Temp 97.8 °F (36.6 °C) (Tympanic)   Ht 167.6 cm (66\")   Wt 97.7 kg (215 lb 6.4 oz)   BMI 34.77 kg/m²       Physical Exam         Constitutional   Appearance  · : well developed, well-nourished, alert and in no acute distress, voice clear and strong    Head  Inspection  · : no deformities or lesions  Face  Inspection  · : No facial lesions; House-Brackmann I/VI bilaterally  Palpation  · : No TMJ crepitus nor  muscle tenderness bilaterally    Eyes  Vision  Visual Fields  · : Extraocular movements are intact. No spontaneous or gaze-induced nystagmus.  Conjunctivae  · : clear  Sclerae  · : clear  Pupils and Irises  · : pupils equal, round, and reactive to light.     Ears, Nose, Mouth and Throat    Ears    External Ears  · : appearance within normal limits, no lesions present  Otoscopic Examination  · : Tympanic membrane appearance within normal limits bilaterally without perforations, well-aerated middle ears  Hearing  · : intact to conversational voice both ears  Tunning fork testing:     :    Nose    External Nose  · : appearance normal  Intranasal Exam  · : mucosa within normal limits, vestibules normal, no intranasal lesions present, septum midline, bilaterally hypertrophic inferior turbinates, sinuses non tender to percussion  Oral Cavity    Oral Mucosa  · : oral mucosa normal without pallor or cyanosis  Lips  · : lip appearance normal  Teeth  · : normal dentition for age  Gums  · : gums pink, non-swollen, no bleeding present  Tongue  · : tongue appearance normal; normal mobility  Palate  · : hard palate normal, soft palate appearance normal with symmetric mobility    Throat    Oropharynx  · : no inflammation or lesions present, tonsils within normal limits  Hypopharynx  · : appearance within normal limits, superior epiglottis within normal limits  Larynx  · : appearance " within normal limits, vocal cords within normal limits, no lesions present    Neck  Inspection/Palpation  · : normal appearance, no masses or tenderness, trachea midline; thyroid size normal, nontender, no nodules or masses present on palpation    Respiratory  Respiratory Effort  · : breathing unlabored  Inspection of Chest  · : normal appearance, no retractions    Cardiovascular  Heart  · : regular rate and rhythm    Lymphatic  Neck  · : no lymphadenopathy present  Supraclavicular Nodes  · : no lymphadenopathy present  Preauricular Nodes  · : no lymphadenopathy present    Skin and Subcutaneous Tissue  General Inspection  · : Regarding face and neck - there are no rashes present, no lesions present, and no areas of discoloration    Neurologic  Cranial Nerves  · : cranial nerves II-XII are grossly intact bilaterally  Gait and Station  · : normal gait, able to stand without diffculty    Psychiatric  Judgement and Insight  · : judgment and insight intact  Mood and Affect  · : mood normal, affect appropriate          Assessment and Plan    Diagnoses and all orders for this visit:    1. Nasal obstruction (Primary)    2. Allergic rhinitis, unspecified seasonality, unspecified trigger    3. Gastroesophageal reflux disease, unspecified whether esophagitis present    4. Sensorineural hearing loss (SNHL) of both ears  -     Comprehensive Hearing Test; Future  -     Tympanometry; Future    Examination today revealed bilaterally hypertrophic inferior turbinates.  Treutlen maneuver was normal and did not reveal any obstruction.  I discussed performing nasal saline irrigations and switching her Dymista to evening time after doing the rinses.  If she continues to have issues, I will plan on seeing her back in the clinic for further evaluation regarding her nose.  Ear exam today was normal, and have ordered an audiogram to be performed to test her hearing.  I will plan to see her back in the clinic afterwards to discuss the results  and further management.  We also had a lengthy discussion regarding her GERD as I think this may be contributing to her voice.  I recommended avoiding late night eating and snacking with a goal of going to sleep in an empty stomach.  She will make those changes, and I will plan to discuss this further at the next clinic visit.    Follow Up   No follow-ups on file.  Patient was given instructions and counseling regarding her condition or for health maintenance advice. Please see specific information pulled into the AVS if appropriate.

## 2022-10-20 ENCOUNTER — TELEPHONE (OUTPATIENT)
Dept: FAMILY MEDICINE CLINIC | Facility: CLINIC | Age: 62
End: 2022-10-20

## 2022-10-20 NOTE — TELEPHONE ENCOUNTER
lvmtcb   Patient cancelled their appointment scheduled for 10/19/22 @845 with Milton Zamarripa. Would patient like to reschedule?    HUB TO READ AND SHARE

## 2022-10-24 ENCOUNTER — CLINICAL SUPPORT (OUTPATIENT)
Dept: FAMILY MEDICINE CLINIC | Facility: CLINIC | Age: 62
End: 2022-10-24

## 2022-10-24 DIAGNOSIS — Z23 NEED FOR INFLUENZA VACCINATION: Primary | ICD-10-CM

## 2022-10-24 PROCEDURE — 90471 IMMUNIZATION ADMIN: CPT | Performed by: NURSE PRACTITIONER

## 2022-10-24 PROCEDURE — 90686 IIV4 VACC NO PRSV 0.5 ML IM: CPT | Performed by: NURSE PRACTITIONER

## 2022-11-02 ENCOUNTER — OFFICE VISIT (OUTPATIENT)
Dept: FAMILY MEDICINE CLINIC | Facility: CLINIC | Age: 62
End: 2022-11-02

## 2022-11-02 VITALS
DIASTOLIC BLOOD PRESSURE: 80 MMHG | WEIGHT: 211.8 LBS | HEIGHT: 66 IN | TEMPERATURE: 97.8 F | BODY MASS INDEX: 34.04 KG/M2 | SYSTOLIC BLOOD PRESSURE: 122 MMHG | HEART RATE: 70 BPM | OXYGEN SATURATION: 98 %

## 2022-11-02 DIAGNOSIS — F41.9 ANXIETY: ICD-10-CM

## 2022-11-02 DIAGNOSIS — M79.661 PAIN OF RIGHT LOWER LEG: Primary | ICD-10-CM

## 2022-11-02 DIAGNOSIS — B37.31 VAGINAL YEAST INFECTION: ICD-10-CM

## 2022-11-02 PROCEDURE — 99214 OFFICE O/P EST MOD 30 MIN: CPT | Performed by: NURSE PRACTITIONER

## 2022-11-02 RX ORDER — HYDROXYZINE HYDROCHLORIDE 25 MG/1
25 TABLET, FILM COATED ORAL 3 TIMES DAILY PRN
Qty: 90 TABLET | Refills: 0 | Status: SHIPPED | OUTPATIENT
Start: 2022-11-02

## 2022-11-02 RX ORDER — FLUCONAZOLE 150 MG/1
150 TABLET ORAL ONCE
Qty: 2 TABLET | Refills: 0 | Status: SHIPPED | OUTPATIENT
Start: 2022-11-02 | End: 2022-11-02

## 2022-11-02 NOTE — PROGRESS NOTES
"Chief Complaint  Pelvic Pain (Right side ), Leg Pain (Right side ), and Stress    Subjective         Judy Ramey presents to [facility]  Patient presents to the office today with complaints of vaginal discomfort and odor.  Patient states that she thinks that she has a yeast infection as she has had this before.  She states that the symptoms have been present for couple weeks.  She states that she thought it would get better which is why she did not contact the office.  She also states that she has been having right leg pain.  She states that this started earlier in the week.  She states that in the back of her lower leg.  She does state that she has noticed some mild swelling and redness to the leg.  Patient does also state that she has been under increased stress.  She states that she is caring for her  as well as her mother.  She states that she had previously been prescribed as needed medication for anxiety and feels that she may need this sent back in for her.  He does state that her daughters help which turn helps her a great deal.       Objective     Vitals:    11/02/22 1026   BP: 122/80   BP Location: Right arm   Patient Position: Sitting   Cuff Size: Adult   Pulse: 70   Temp: 97.8 °F (36.6 °C)   TempSrc: Temporal   SpO2: 98%   Weight: 96.1 kg (211 lb 12.8 oz)   Height: 167.6 cm (65.98\")      Body mass index is 34.2 kg/m².    BMI is >= 30 and <35. (Class 1 Obesity). The following options were offered after discussion;: nutrition counseling/recommendations        Physical Exam  Vitals reviewed.   Constitutional:       Appearance: Normal appearance.   Cardiovascular:      Rate and Rhythm: Normal rate and regular rhythm.      Pulses: Normal pulses.      Heart sounds: Normal heart sounds, S1 normal and S2 normal. No murmur heard.  Pulmonary:      Effort: Pulmonary effort is normal. No respiratory distress.      Breath sounds: Normal breath sounds.   Musculoskeletal: "      Right lower leg: Tenderness present. 1+ Edema present.      Comments: Calf tenderness noted, positive Brian's sign   Skin:     General: Skin is warm and dry.   Neurological:      Mental Status: She is alert and oriented to person, place, and time.   Psychiatric:         Attention and Perception: Attention normal.         Mood and Affect: Mood normal.         Behavior: Behavior normal.          Result Review :   The following data was reviewed by: FERNY Martin on 11/02/2022:      Procedures    Assessment and Plan   Diagnoses and all orders for this visit:    1. Pain of right lower leg (Primary)  -     Duplex Venous Lower Extremity - Right CAR; Future    2. Anxiety  -     hydrOXYzine (ATARAX) 25 MG tablet; Take 1 tablet by mouth 3 (Three) Times a Day As Needed for Anxiety.  Dispense: 90 tablet; Refill: 0    3. Vaginal yeast infection  -     fluconazole (Diflucan) 150 MG tablet; Take 1 tablet by mouth 1 (One) Time for 1 dose. May repeat in 4 days if needed  Dispense: 2 tablet; Refill: 0          Follow Up   Return if symptoms worsen or fail to improve.  Patient was given instructions and counseling regarding her condition or for health maintenance advice. Please see specific information pulled into the AVS if appropriate.

## 2022-11-16 PROCEDURE — U0004 COV-19 TEST NON-CDC HGH THRU: HCPCS

## 2022-11-17 ENCOUNTER — TELEPHONE (OUTPATIENT)
Dept: FAMILY MEDICINE CLINIC | Facility: CLINIC | Age: 62
End: 2022-11-17

## 2022-11-17 ENCOUNTER — HOSPITAL ENCOUNTER (OUTPATIENT)
Dept: INFUSION THERAPY | Facility: HOSPITAL | Age: 62
Setting detail: INFUSION SERIES
Discharge: HOME | End: 2022-11-17

## 2022-11-17 ENCOUNTER — TELEPHONE (OUTPATIENT)
Dept: URGENT CARE | Facility: CLINIC | Age: 62
End: 2022-11-17

## 2022-11-17 ENCOUNTER — TRANSCRIBE ORDERS (OUTPATIENT)
Dept: ADMINISTRATIVE | Facility: HOSPITAL | Age: 62
End: 2022-11-17

## 2022-11-17 VITALS
SYSTOLIC BLOOD PRESSURE: 138 MMHG | HEIGHT: 66 IN | HEART RATE: 68 BPM | WEIGHT: 212 LBS | TEMPERATURE: 98 F | DIASTOLIC BLOOD PRESSURE: 68 MMHG | BODY MASS INDEX: 34.07 KG/M2 | RESPIRATION RATE: 18 BRPM

## 2022-11-17 DIAGNOSIS — U07.1 COVID-19: Primary | ICD-10-CM

## 2022-11-17 DIAGNOSIS — U07.1 CLINICAL DIAGNOSIS OF SEVERE ACUTE RESPIRATORY SYNDROME CORONAVIRUS 2 (SARS-COV-2) DISEASE: Primary | ICD-10-CM

## 2022-11-17 PROCEDURE — 25010000002 INJECTION, BEBTELOVIMAB, 175 MG: Performed by: PHYSICIAN ASSISTANT

## 2022-11-17 PROCEDURE — M0222 HC INJECTION BEBTELOVIMAB: HCPCS | Performed by: PHYSICIAN ASSISTANT

## 2022-11-17 RX ORDER — DIPHENHYDRAMINE HYDROCHLORIDE 50 MG/ML
50 INJECTION INTRAMUSCULAR; INTRAVENOUS ONCE AS NEEDED
Status: CANCELLED | OUTPATIENT
Start: 2022-11-17

## 2022-11-17 RX ORDER — EPINEPHRINE 0.1 MG/ML
0.3 SYRINGE (ML) INJECTION AS NEEDED
OUTPATIENT
Start: 2022-11-17

## 2022-11-17 RX ORDER — METHYLPREDNISOLONE SODIUM SUCCINATE 125 MG/2ML
125 INJECTION, POWDER, LYOPHILIZED, FOR SOLUTION INTRAMUSCULAR; INTRAVENOUS AS NEEDED
OUTPATIENT
Start: 2022-11-17

## 2022-11-17 RX ORDER — DIPHENHYDRAMINE HCL 50 MG
50 CAPSULE ORAL ONCE AS NEEDED
OUTPATIENT
Start: 2022-11-17

## 2022-11-17 RX ORDER — DIPHENHYDRAMINE HYDROCHLORIDE 50 MG/ML
50 INJECTION INTRAMUSCULAR; INTRAVENOUS ONCE AS NEEDED
OUTPATIENT
Start: 2022-11-17

## 2022-11-17 RX ORDER — BEBTELOVIMAB 87.5 MG/ML
175 INJECTION, SOLUTION INTRAVENOUS ONCE
Status: CANCELLED | OUTPATIENT
Start: 2022-11-17 | End: 2022-11-17

## 2022-11-17 RX ORDER — DIPHENHYDRAMINE HCL 50 MG
50 CAPSULE ORAL ONCE AS NEEDED
Status: CANCELLED | OUTPATIENT
Start: 2022-11-17

## 2022-11-17 RX ORDER — EPINEPHRINE 0.1 MG/ML
0.3 SYRINGE (ML) INJECTION AS NEEDED
Status: CANCELLED | OUTPATIENT
Start: 2022-11-17

## 2022-11-17 RX ORDER — BEBTELOVIMAB 87.5 MG/ML
175 INJECTION, SOLUTION INTRAVENOUS ONCE
Status: COMPLETED | OUTPATIENT
Start: 2022-11-17 | End: 2022-11-17

## 2022-11-17 RX ORDER — METHYLPREDNISOLONE SODIUM SUCCINATE 125 MG/2ML
125 INJECTION, POWDER, LYOPHILIZED, FOR SOLUTION INTRAMUSCULAR; INTRAVENOUS AS NEEDED
Status: CANCELLED | OUTPATIENT
Start: 2022-11-17

## 2022-11-17 RX ADMIN — BEBTELOVIMAB 175 MG: 87.5 INJECTION, SOLUTION INTRAVENOUS at 17:16

## 2022-11-17 NOTE — TELEPHONE ENCOUNTER
Caller: Judy Ramey    Relationship to patient: Self    Best call back number: [phone]    Date of positive COVID19 test: 11/17    COVID19 symptoms: COUGH     Date of initial quarantine: 11/17    Additional information or concerns: PATIENT STATED SHE WOULD TO BE PRESCRIBED PAXLOVOID DUE TO HER  HAVING CANCER PATIENT STATED SHE HAS NO OTHER SYMPTOMS OTHER THAN A COUGH BUT WOULD LIKE TO CATCH IT EARLY BEFORE IT GETS WORSE.     What is the patients preferred pharmacy: [id number] - [place], [place] - [address] AT [facility] [address] [facility] & [place] - [phone] [facility] [phone] FX

## 2022-11-17 NOTE — TELEPHONE ENCOUNTER
Caller: Judy Ramey    Relationship: Self    What was the call regarding: PATIENT CALLED STATING TO DISREGARD THIS MESSAGE BECAUSE THE DR AT [facility]RGENT [facility]ARE IS GOING TO TAKE OVER CARE FOR THIS ISSUE.

## 2022-11-21 ENCOUNTER — TELEPHONE (OUTPATIENT)
Dept: FAMILY MEDICINE CLINIC | Facility: CLINIC | Age: 62
End: 2022-11-21

## 2022-11-21 ENCOUNTER — HOSPITAL ENCOUNTER (OUTPATIENT)
Dept: CARDIOLOGY | Facility: HOSPITAL | Age: 62
Discharge: HOME | End: 2022-11-21
Admitting: NURSE PRACTITIONER

## 2022-11-21 DIAGNOSIS — M79.661 PAIN OF RIGHT LOWER LEG: ICD-10-CM

## 2022-11-21 LAB
BH CV LOWER VASCULAR LEFT COMMON FEMORAL AUGMENT: NORMAL
BH CV LOWER VASCULAR LEFT COMMON FEMORAL COMPETENT: NORMAL
BH CV LOWER VASCULAR LEFT COMMON FEMORAL COMPRESS: NORMAL
BH CV LOWER VASCULAR LEFT COMMON FEMORAL PHASIC: NORMAL
BH CV LOWER VASCULAR LEFT COMMON FEMORAL SPONT: NORMAL
BH CV LOWER VASCULAR RIGHT COMMON FEMORAL AUGMENT: NORMAL
BH CV LOWER VASCULAR RIGHT COMMON FEMORAL COMPETENT: NORMAL
BH CV LOWER VASCULAR RIGHT COMMON FEMORAL COMPRESS: NORMAL
BH CV LOWER VASCULAR RIGHT COMMON FEMORAL PHASIC: NORMAL
BH CV LOWER VASCULAR RIGHT COMMON FEMORAL SPONT: NORMAL
BH CV LOWER VASCULAR RIGHT DISTAL FEMORAL COMPRESS: NORMAL
BH CV LOWER VASCULAR RIGHT GASTRONEMIUS COMPRESS: NORMAL
BH CV LOWER VASCULAR RIGHT GREATER SAPH AK COMPRESS: NORMAL
BH CV LOWER VASCULAR RIGHT GREATER SAPH BK COMPRESS: NORMAL
BH CV LOWER VASCULAR RIGHT LESSER SAPH COMPRESS: NORMAL
BH CV LOWER VASCULAR RIGHT MID FEMORAL AUGMENT: NORMAL
BH CV LOWER VASCULAR RIGHT MID FEMORAL COMPETENT: NORMAL
BH CV LOWER VASCULAR RIGHT MID FEMORAL COMPRESS: NORMAL
BH CV LOWER VASCULAR RIGHT MID FEMORAL PHASIC: NORMAL
BH CV LOWER VASCULAR RIGHT MID FEMORAL SPONT: NORMAL
BH CV LOWER VASCULAR RIGHT PERONEAL COMPRESS: NORMAL
BH CV LOWER VASCULAR RIGHT POPLITEAL AUGMENT: NORMAL
BH CV LOWER VASCULAR RIGHT POPLITEAL COMPETENT: NORMAL
BH CV LOWER VASCULAR RIGHT POPLITEAL COMPRESS: NORMAL
BH CV LOWER VASCULAR RIGHT POPLITEAL PHASIC: NORMAL
BH CV LOWER VASCULAR RIGHT POPLITEAL SPONT: NORMAL
BH CV LOWER VASCULAR RIGHT POSTERIOR TIBIAL COMPRESS: NORMAL
BH CV LOWER VASCULAR RIGHT PROXIMAL FEMORAL COMPRESS: NORMAL
BH CV LOWER VASCULAR RIGHT SAPHENOFEMORAL JUNCTION COMPRESS: NORMAL
MAXIMAL PREDICTED HEART RATE: 158 BPM
STRESS TARGET HR: 134 BPM

## 2022-11-21 PROCEDURE — 93971 EXTREMITY STUDY: CPT | Performed by: SURGERY

## 2022-11-21 PROCEDURE — 93971 EXTREMITY STUDY: CPT

## 2022-11-21 NOTE — TELEPHONE ENCOUNTER
LVMTCB    Patient missed their appointment scheduled on 11/21/22 with Azucena Sanchez.    Would patient like to be rescheduled?    No show letter sent to patient either via [employer]BT[employer]am[employer] or mail.     HUB TO SHARE

## 2023-01-17 DIAGNOSIS — L30.9 ECZEMA, UNSPECIFIED TYPE: ICD-10-CM

## 2023-01-17 DIAGNOSIS — M79.604 PAIN OF RIGHT LOWER EXTREMITY: ICD-10-CM

## 2023-01-17 RX ORDER — DESONIDE 0.5 MG/G
1 CREAM TOPICAL 2 TIMES DAILY
Qty: 15 G | Refills: 0 | Status: SHIPPED | OUTPATIENT
Start: 2023-01-17

## 2023-01-17 RX ORDER — DICLOFENAC SODIUM 75 MG/1
75 TABLET, DELAYED RELEASE ORAL 2 TIMES DAILY
Qty: 60 TABLET | Refills: 0 | Status: SHIPPED | OUTPATIENT
Start: 2023-01-17

## 2023-01-17 RX ORDER — LEVOTHYROXINE SODIUM 125 MCG
125 TABLET ORAL DAILY
Qty: 90 TABLET | Refills: 1 | Status: SHIPPED | OUTPATIENT
Start: 2023-01-17

## 2023-01-17 RX ORDER — ESTRADIOL 0.05 MG/D
1 FILM, EXTENDED RELEASE TRANSDERMAL WEEKLY
Qty: 12 PATCH | Refills: 1 | Status: SHIPPED | OUTPATIENT
Start: 2023-01-17 | End: 2023-01-20

## 2023-01-17 NOTE — TELEPHONE ENCOUNTER
Caller: Judy Ramey    Relationship: Self    Best call back number: [phone]    Requested Prescriptions:   Requested Prescriptions     Pending Prescriptions Disp Refills   • Synthroid 125 MCG tablet 90 tablet 1     Sig: Take 1 tablet by mouth Daily.   • desonide (DesOwen) 0.05 % cream 15 g 0     Sig: Apply 1 application topically to the appropriate area as directed 2 (Two) Times a Day.   • diclofenac (VOLTAREN) 75 MG EC tablet 60 tablet 0     Sig: Take 1 tablet by mouth 2 (Two) Times a Day.   • estradiol (MINIVELLE, VIVELLE-DOT) 0.05 MG/24HR patch       Sig: Place 1 patch on the skin as directed by provider 1 (One) Time Per Week.        Pharmacy where request should be sent: [facility] [phone] [facility] [phone] FX     Does the patient have less than a 3 day supply:  [] Yes  [x] No    Would you like a call back once the refill request has been completed: [x] Yes [] No    If the office needs to give you a call back, can they leave a voicemail: [x] Yes [] No    Tatyana Faulkner Rep   01/17/23 09:11 EST

## 2023-01-20 RX ORDER — ESTRADIOL 0.05 MG/D
1 FILM, EXTENDED RELEASE TRANSDERMAL WEEKLY
Qty: 12 PATCH | Refills: 1 | Status: SHIPPED | OUTPATIENT
Start: 2023-01-20

## 2023-01-20 NOTE — TELEPHONE ENCOUNTER
Dex from [facility] is calling to verify patients estradiol patient. Dex states that the patch that was sent over if for the twice weekly patch and directions are for the once weekly patch. Pharmacy is requesting to know which patient provider would like patient on and to send over a new prescription if possible.

## 2023-02-09 ENCOUNTER — OFFICE VISIT (OUTPATIENT)
Dept: PULMONOLOGY | Facility: CLINIC | Age: 63
End: 2023-02-09
Payer: OTHER GOVERNMENT

## 2023-02-09 VITALS
DIASTOLIC BLOOD PRESSURE: 68 MMHG | HEART RATE: 58 BPM | HEIGHT: 66 IN | WEIGHT: 206 LBS | BODY MASS INDEX: 33.11 KG/M2 | OXYGEN SATURATION: 95 % | SYSTOLIC BLOOD PRESSURE: 114 MMHG | RESPIRATION RATE: 18 BRPM | TEMPERATURE: 98.4 F

## 2023-02-09 DIAGNOSIS — J34.89 NASAL OBSTRUCTION: ICD-10-CM

## 2023-02-09 DIAGNOSIS — J30.9 ALLERGIC RHINITIS, UNSPECIFIED SEASONALITY, UNSPECIFIED TRIGGER: ICD-10-CM

## 2023-02-09 DIAGNOSIS — J45.50 SEVERE PERSISTENT ASTHMA WITHOUT COMPLICATION: Primary | ICD-10-CM

## 2023-02-09 PROCEDURE — 99214 OFFICE O/P EST MOD 30 MIN: CPT | Performed by: INTERNAL MEDICINE

## 2023-02-09 RX ORDER — NACL/NAHCO3/HYALURON SOD/ALOE 0.9 %
1 GEL (GRAM) NASAL 2 TIMES DAILY
Qty: 28.4 G | Refills: 11 | Status: SHIPPED | OUTPATIENT
Start: 2023-02-09

## 2023-02-09 RX ORDER — IPRATROPIUM BROMIDE AND ALBUTEROL SULFATE 2.5; .5 MG/3ML; MG/3ML
3 SOLUTION RESPIRATORY (INHALATION)
Qty: 360 ML | Refills: 11 | Status: SHIPPED | OUTPATIENT
Start: 2023-02-09

## 2023-02-09 RX ORDER — CARBOXYMETHYLCELLULOSE SODIUM 0.5 G/100ML
SOLUTION/ DROPS OPHTHALMIC
COMMUNITY
Start: 2023-01-14

## 2023-02-09 RX ORDER — TOLTERODINE 2 MG/1
CAPSULE, EXTENDED RELEASE ORAL
COMMUNITY
Start: 2023-01-14

## 2023-02-09 RX ORDER — FLUCONAZOLE 150 MG/1
TABLET ORAL
COMMUNITY
Start: 2022-11-02

## 2023-02-09 RX ORDER — SOD CHLOR,SOD BICARB/NETI POT
1 PACKET, WITH RINSE DEVICE NASAL DAILY
Qty: 50 EACH | Refills: 11 | Status: SHIPPED | OUTPATIENT
Start: 2023-02-09 | End: 2023-03-02 | Stop reason: SDUPTHER

## 2023-02-09 RX ORDER — FLUTICASONE PROPIONATE AND SALMETEROL XINAFOATE 230; 21 UG/1; UG/1
2 AEROSOL, METERED RESPIRATORY (INHALATION)
Qty: 3 EACH | Refills: 4 | Status: SHIPPED | OUTPATIENT
Start: 2023-02-09

## 2023-02-09 RX ORDER — NACL/NAHCO3/HYALURON SOD/ALOE 0.9 %
1 GEL (GRAM) NASAL 2 TIMES DAILY
Qty: 1 EACH | Refills: 11 | Status: SHIPPED | OUTPATIENT
Start: 2023-02-09 | End: 2023-03-01 | Stop reason: SDUPTHER

## 2023-02-09 RX ORDER — NACL/NAHCO3/HYALURON SOD/ALOE 0.9 %
1 GEL (GRAM) NASAL 2 TIMES DAILY
Qty: 1 EACH | Refills: 11 | Status: CANCELLED | OUTPATIENT
Start: 2023-02-09

## 2023-02-09 NOTE — ASSESSMENT & PLAN NOTE
Patient with very large turbinates that appears however it is very difficult to assess the rest of her nose for polyps given the size of her turbinates    Patient with difficulties breathing out of her nose    Has seen ENT would like a second opinion    We will send patient to see  to see if there is anything he can do to help patient breathe out of her nose she has difficulties breathing through her nose

## 2023-02-09 NOTE — PROGRESS NOTES
"Chief Complaint  Severe persistent asthma  and Follow-up (4 Month )    Subjective        Judy Ramey presents to [facility]  History of Present Illness  Follow-up for asthma  Symptoms better since being on Dupixent  Using rescue albuterol very infrequently  Less than 1 time per month  No nocturnal symptoms  With difficulties breathing out of her nose  Seen ENT who told her that her enlarged turbinates are frequently seen in -American patients it is not pathological however the patient does struggle to get breath in and out of her nose  Objective   Vital Signs:  /68 (BP Location: Left arm, Patient Position: Sitting, Cuff Size: Adult)   Pulse 58   Temp 98.4 °F (36.9 °C) (Temporal)   Resp 18   Ht 167.6 cm (66\")   Wt 93.4 kg (206 lb)   SpO2 95% Comment: Room air  BMI 33.25 kg/m²   Estimated body mass index is 33.25 kg/m² as calculated from the following:    Height as of this encounter: 167.6 cm (66\").    Weight as of this encounter: 93.4 kg (206 lb).             Physical Exam   Vital Signs Reviewed  General WDWN, Alert, NAD.    Nose-very enlarged turbinates with near complete obstruction on the left  Chest:  good aeration, clear to auscultation bilaterally, tympanic to percussion bilaterally, no work of breathing noted  CV: RRR, no MGR, .  EXT:  no clubbing, no cyanosis, no edema, no joint tenderness  Neuro:  A&Ox3, CN grossly intact, no focal deficits.  Skin: No rashes or lesions noted  Result Review :                   Assessment and Plan   Diagnoses and all orders for this visit:    1. Severe persistent asthma without complication (Primary)  Assessment & Plan:  Asthma is well controlled at this time  Continue Dupixent refills given today  Continue Advair  Continue Singulair  Continue as needed albuterol    Orders:  -     ipratropium-albuterol (DUO-NEB) 0.5-2.5 mg/3 ml nebulizer; Take 3 mL by nebulization 4 (Four) Times a Day.  Dispense: " 360 mL; Refill: 11  -     fluticasone-salmeterol (Advair HFA) 230-21 MCG/ACT inhaler; Inhale 2 puffs 2 (Two) Times a Day.  Dispense: 3 each; Refill: 4    2. Nasal obstruction  Assessment & Plan:  Patient with very large turbinates that appears however it is very difficult to assess the rest of her nose for polyps given the size of her turbinates    Patient with difficulties breathing out of her nose    Has seen ENT would like a second opinion    We will send patient to see  to see if there is anything he can do to help patient breathe out of her nose she has difficulties breathing through her nose    Orders:  -     Ambulatory Referral to ENT (Otolaryngology)    3. Allergic rhinitis, unspecified seasonality, unspecified trigger  Assessment & Plan:  Continue nasal steroids  Continue Betsy pot  Prescription given today for nasal gel      Other orders  -     Dupilumab 200 MG/1.14ML solution prefilled syringe; Inject 200 mg under the skin into the appropriate area as directed Every 14 (Fourteen) Days.  Dispense: 4 each; Refill: 11  -     Hypertonic Nasal Wash (NasaFlo Neti Pot Nasal Wash) pack; 1 ampule into the nostril(s) as directed by provider Daily.  Dispense: 50 each; Refill: 11  -     Saline (NasoGel) gel; 1 spray into the nostril(s) as directed by provider 2 (Two) Times a Day.  Dispense: 1 each; Refill: 11  -     Saline (NasoGel) gel; 1 spray into the nostril(s) as directed by provider 2 (Two) Times a Day.  Dispense: 28.4 g; Refill: 11           Follow Up   No follow-ups on file.  Patient was given instructions and counseling regarding her condition or for health maintenance advice. Please see specific information pulled into the AVS if appropriate.

## 2023-02-09 NOTE — ASSESSMENT & PLAN NOTE
Asthma is well controlled at this time  Continue Dupixent refills given today  Continue Advair  Continue Singulair  Continue as needed albuterol

## 2023-02-10 ENCOUNTER — TELEPHONE (OUTPATIENT)
Dept: PULMONOLOGY | Facility: CLINIC | Age: 63
End: 2023-02-10
Payer: OTHER GOVERNMENT

## 2023-02-10 NOTE — TELEPHONE ENCOUNTER
Patient's was referred to ENT Dr. De Luna and she is establish with Dr. Sharpe already. Which provider would you prefer the patient see? Please advise. Thank you.

## 2023-03-01 ENCOUNTER — TELEPHONE (OUTPATIENT)
Dept: PULMONOLOGY | Facility: CLINIC | Age: 63
End: 2023-03-01
Payer: OTHER GOVERNMENT

## 2023-03-01 DIAGNOSIS — J30.2 SEASONAL ALLERGIES: Primary | ICD-10-CM

## 2023-03-01 RX ORDER — NACL/NAHCO3/HYALURON SOD/ALOE 0.9 %
1 GEL (GRAM) NASAL 2 TIMES DAILY
Qty: 1 EACH | Refills: 11 | Status: SHIPPED | OUTPATIENT
Start: 2023-03-01

## 2023-03-01 NOTE — TELEPHONE ENCOUNTER
Patient called and stated that her Nasal Rinse medication was suppose to go to [facility] on [address]. It was sent to [facility] instead. Mark advise. Thank you.

## 2023-03-02 DIAGNOSIS — J45.50 SEVERE PERSISTENT ASTHMA WITHOUT COMPLICATION: ICD-10-CM

## 2023-03-02 DIAGNOSIS — J30.2 SEASONAL ALLERGIES: Primary | ICD-10-CM

## 2023-03-02 RX ORDER — SOD CHLOR,SOD BICARB/NETI POT
1 PACKET, WITH RINSE DEVICE NASAL DAILY
Qty: 50 EACH | Refills: 11 | Status: SHIPPED | OUTPATIENT
Start: 2023-03-02

## 2023-03-02 RX ORDER — ALBUTEROL SULFATE 90 UG/1
2 AEROSOL, METERED RESPIRATORY (INHALATION) EVERY 4 HOURS PRN
Qty: 54 G | Refills: 4 | Status: SHIPPED | OUTPATIENT
Start: 2023-03-02

## 2023-03-02 NOTE — TELEPHONE ENCOUNTER
Patient called in and stated that an incorrect medication was sent to the pharmacy yesterday.  Patient would like to have the hypertonic nasal wash (NasaFlo Neti Pot Nasal Wash) pack sent to the pharmacy on [address].    During this conversation, patient also stated that she was out of refills on her albuterol inhaler.  Can this be reviewed and both medications sent to [facility] pharmacy?  Thank you.

## 2023-03-02 NOTE — TELEPHONE ENCOUNTER
Sent to pharmacy, left voicemail for patient and also sent a [employer] message to inform patient.

## 2023-04-25 ENCOUNTER — OFFICE VISIT (OUTPATIENT)
Dept: FAMILY MEDICINE CLINIC | Facility: CLINIC | Age: 63
End: 2023-04-25
Payer: OTHER GOVERNMENT

## 2023-04-25 ENCOUNTER — LAB (OUTPATIENT)
Dept: LAB | Facility: HOSPITAL | Age: 63
End: 2023-04-25
Payer: OTHER GOVERNMENT

## 2023-04-25 VITALS
HEART RATE: 70 BPM | SYSTOLIC BLOOD PRESSURE: 124 MMHG | BODY MASS INDEX: 33.65 KG/M2 | OXYGEN SATURATION: 98 % | WEIGHT: 209.4 LBS | DIASTOLIC BLOOD PRESSURE: 70 MMHG | TEMPERATURE: 97.5 F | HEIGHT: 66 IN

## 2023-04-25 DIAGNOSIS — E03.9 HYPOTHYROIDISM, UNSPECIFIED TYPE: ICD-10-CM

## 2023-04-25 DIAGNOSIS — Z78.0 POSTMENOPAUSAL: ICD-10-CM

## 2023-04-25 DIAGNOSIS — Z12.31 ENCOUNTER FOR SCREENING MAMMOGRAM FOR MALIGNANT NEOPLASM OF BREAST: ICD-10-CM

## 2023-04-25 DIAGNOSIS — K21.9 GASTROESOPHAGEAL REFLUX DISEASE, UNSPECIFIED WHETHER ESOPHAGITIS PRESENT: ICD-10-CM

## 2023-04-25 DIAGNOSIS — G89.29 CHRONIC RIGHT-SIDED LOW BACK PAIN WITH RIGHT-SIDED SCIATICA: ICD-10-CM

## 2023-04-25 DIAGNOSIS — M25.562 CHRONIC PAIN OF BOTH KNEES: Primary | ICD-10-CM

## 2023-04-25 DIAGNOSIS — G89.29 CHRONIC PAIN OF BOTH KNEES: Primary | ICD-10-CM

## 2023-04-25 DIAGNOSIS — M25.561 CHRONIC PAIN OF BOTH KNEES: Primary | ICD-10-CM

## 2023-04-25 DIAGNOSIS — Z13.220 SCREENING FOR LIPID DISORDERS: ICD-10-CM

## 2023-04-25 DIAGNOSIS — F41.9 ANXIETY: ICD-10-CM

## 2023-04-25 DIAGNOSIS — M54.41 CHRONIC RIGHT-SIDED LOW BACK PAIN WITH RIGHT-SIDED SCIATICA: ICD-10-CM

## 2023-04-25 DIAGNOSIS — Z00.00 WELL ADULT EXAM: ICD-10-CM

## 2023-04-25 LAB
ALBUMIN SERPL-MCNC: 4.4 G/DL (ref 3.5–5.2)
ALBUMIN/GLOB SERPL: 1.9 G/DL
ALP SERPL-CCNC: 104 U/L (ref 39–117)
ALT SERPL W P-5'-P-CCNC: 19 U/L (ref 1–33)
ANION GAP SERPL CALCULATED.3IONS-SCNC: 6.1 MMOL/L (ref 5–15)
AST SERPL-CCNC: 16 U/L (ref 1–32)
BILIRUB SERPL-MCNC: 0.2 MG/DL (ref 0–1.2)
BUN SERPL-MCNC: 18 MG/DL (ref 8–23)
BUN/CREAT SERPL: 15.7 (ref 7–25)
CALCIUM SPEC-SCNC: 9.8 MG/DL (ref 8.6–10.5)
CHLORIDE SERPL-SCNC: 107 MMOL/L (ref 98–107)
CHOLEST SERPL-MCNC: 184 MG/DL (ref 0–200)
CO2 SERPL-SCNC: 29.9 MMOL/L (ref 22–29)
CREAT SERPL-MCNC: 1.15 MG/DL (ref 0.57–1)
DEPRECATED RDW RBC AUTO: 38.2 FL (ref 37–54)
EGFRCR SERPLBLD CKD-EPI 2021: 54 ML/MIN/1.73
ERYTHROCYTE [DISTWIDTH] IN BLOOD BY AUTOMATED COUNT: 12.4 % (ref 12.3–15.4)
GLOBULIN UR ELPH-MCNC: 2.3 GM/DL
GLUCOSE SERPL-MCNC: 107 MG/DL (ref 65–99)
HCT VFR BLD AUTO: 34.9 % (ref 34–46.6)
HDLC SERPL-MCNC: 62 MG/DL (ref 40–60)
HGB BLD-MCNC: 12 G/DL (ref 12–15.9)
LDLC SERPL CALC-MCNC: 109 MG/DL (ref 0–100)
LDLC/HDLC SERPL: 1.74 {RATIO}
MCH RBC QN AUTO: 29.5 PG (ref 26.6–33)
MCHC RBC AUTO-ENTMCNC: 34.4 G/DL (ref 31.5–35.7)
MCV RBC AUTO: 85.7 FL (ref 79–97)
PLATELET # BLD AUTO: 175 10*3/MM3 (ref 140–450)
PMV BLD AUTO: 10.3 FL (ref 6–12)
POTASSIUM SERPL-SCNC: 4.9 MMOL/L (ref 3.5–5.2)
PROT SERPL-MCNC: 6.7 G/DL (ref 6–8.5)
RBC # BLD AUTO: 4.07 10*6/MM3 (ref 3.77–5.28)
SODIUM SERPL-SCNC: 143 MMOL/L (ref 136–145)
T4 FREE SERPL-MCNC: 1.53 NG/DL (ref 0.93–1.7)
TRIGL SERPL-MCNC: 70 MG/DL (ref 0–150)
TSH SERPL DL<=0.05 MIU/L-ACNC: 3.95 UIU/ML (ref 0.27–4.2)
VLDLC SERPL-MCNC: 13 MG/DL (ref 5–40)
WBC NRBC COR # BLD: 4.14 10*3/MM3 (ref 3.4–10.8)

## 2023-04-25 PROCEDURE — 84443 ASSAY THYROID STIM HORMONE: CPT

## 2023-04-25 PROCEDURE — 80053 COMPREHEN METABOLIC PANEL: CPT

## 2023-04-25 PROCEDURE — 80061 LIPID PANEL: CPT

## 2023-04-25 PROCEDURE — 85027 COMPLETE CBC AUTOMATED: CPT

## 2023-04-25 PROCEDURE — 36415 COLL VENOUS BLD VENIPUNCTURE: CPT

## 2023-04-25 PROCEDURE — 84439 ASSAY OF FREE THYROXINE: CPT

## 2023-04-25 RX ORDER — SOD CHLOR,BICARB/SQUEEZ BOTTLE
PACKET, WITH RINSE DEVICE NASAL
COMMUNITY
Start: 2023-03-07

## 2023-04-25 RX ORDER — AMITRIPTYLINE HYDROCHLORIDE 25 MG/1
25 TABLET, FILM COATED ORAL NIGHTLY PRN
Qty: 90 TABLET | Refills: 0 | Status: SHIPPED | OUTPATIENT
Start: 2023-04-25

## 2023-04-25 RX ORDER — HYDROXYZINE HYDROCHLORIDE 25 MG/1
25 TABLET, FILM COATED ORAL 3 TIMES DAILY PRN
Qty: 90 TABLET | Refills: 0 | Status: SHIPPED | OUTPATIENT
Start: 2023-04-25

## 2023-04-25 NOTE — PROGRESS NOTES
"Chief Complaint  Annual Exam    Subjective         Judy Ramey presents to [facility]  Patient presents to the office today for annual wellness physical.  She does state that she has been struggling with bilateral knee pain.  She states that she has had chronic knee pain for years but steams is getting worse.  She does state that she would like a referral to go back to see Dr. Torres in [facility].  Patient also has been having some mild intermittent dizziness.  She states that this is intermittent and only occurs temporarily for seconds.  She does state that she has been having increased congestion sinus pressure.  Did explain that she is due for routine lab work.  I also explained that she is due for her mammogram and bone density in a few months.  Blood pressure is 124/70.  She denies any chest pain shortness of breath palpitations at this time.       Objective     Vitals:    04/25/23 0805   BP: 124/70   BP Location: Right arm   Patient Position: Sitting   Cuff Size: Large Adult   Pulse: 70   Temp: 97.5 °F (36.4 °C)   TempSrc: Temporal   SpO2: 98%   Weight: 95 kg (209 lb 6.4 oz)   Height: 167.6 cm (66\")      Body mass index is 33.8 kg/m².    BMI is >= 30 and <35. (Class 1 Obesity). The following options were offered after discussion;: nutrition counseling/recommendations        Physical Exam  Constitutional:       Appearance: Normal appearance.   Cardiovascular:      Rate and Rhythm: Normal rate and regular rhythm.      Pulses: Normal pulses.      Heart sounds: Normal heart sounds, S1 normal and S2 normal. No murmur heard.  Pulmonary:      Effort: Pulmonary effort is normal. No respiratory distress.      Breath sounds: Normal breath sounds.   Musculoskeletal:      Right knee: Swelling and crepitus present. Tenderness present.      Left knee: Swelling present.   Skin:     General: Skin is warm and dry.   Neurological:      Mental Status: She is alert and oriented " to person, place, and time.   Psychiatric:         Attention and Perception: Attention normal.         Mood and Affect: Mood normal.         Behavior: Behavior normal.          Result Review :   The following data was reviewed by: FERNY Martin on 04/25/2023:      Procedures    Assessment and Plan   Diagnoses and all orders for this visit:    1. Chronic pain of both knees (Primary)  -     Ambulatory Referral to Orthopedic Surgery  -     Diclofenac Sodium (VOLTAREN) 1 % gel gel; Apply 4 g topically to the appropriate area as directed 3 (Three) Times a Day.  Dispense: 100 g; Refill: 2    2. Chronic right-sided low back pain with right-sided sciatica  -     amitriptyline (ELAVIL) 25 MG tablet; Take 1 tablet by mouth At Night As Needed for Sleep.  Dispense: 90 tablet; Refill: 0    3. Anxiety  -     hydrOXYzine (ATARAX) 25 MG tablet; Take 1 tablet by mouth 3 (Three) Times a Day As Needed for Anxiety.  Dispense: 90 tablet; Refill: 0    4. Gastroesophageal reflux disease, unspecified whether esophagitis present  -     CBC (No Diff); Future  -     Comprehensive Metabolic Panel; Future  -     TSH+Free T4; Future    5. Screening for lipid disorders  -     Lipid Panel; Future    6. Hypothyroidism, unspecified type  -     TSH+Free T4; Future    7. Well adult exam    8. Encounter for screening mammogram for malignant neoplasm of breast  -     Mammo Screening Digital Tomosynthesis Bilateral With CAD; Future    9. Postmenopausal  -     DEXA Bone Density Axial; Future          Preventative Counseling includes healthy diet exercise, breast cancer screening bone density screening and colon cancer screening    Follow Up   Return in about 6 months (around 10/25/2023) for Recheck.  Patient was given instructions and counseling regarding her condition or for health maintenance advice. Please see specific information pulled into the AVS if appropriate.

## 2023-05-10 NOTE — TELEPHONE ENCOUNTER
Caller: Judy Ramey    Relationship: Self    Best call back number: [phone]    What is the medical concern/diagnosis: HAVING TROUBLE WITH TALKING    What specialty or service is being requested: VOICE THERAPY      Any additional details: PATIENT STATES THAT SHE HAS ALREADY SEEN A ENT SPECIALIST AND THEY REFERRED HER TO SEEK VOICE THERAPY.        
[FreeTextEntry1] : I had a lengthy discussion with the patient in regards to treatment plan and diagnosis. There are no red flag findings on imaging nor are there any red flag findings on clinical exams.  Therefore we will proceed with a course of conservative treatment. This would include physical therapy/home exercise program, Tylenol, NSAIDs as medically indicated. A prescription was provided for physical therapy. Rx Tizanidine  The patient will follow up with me in approximately 6 weeks.  I encouraged the patient to follow-up sooner if there are any new or worsening symptoms.

## 2023-05-16 DIAGNOSIS — J30.9 ALLERGIC RHINITIS, UNSPECIFIED SEASONALITY, UNSPECIFIED TRIGGER: ICD-10-CM

## 2023-05-16 DIAGNOSIS — J45.50 SEVERE PERSISTENT ASTHMA WITHOUT COMPLICATION: ICD-10-CM

## 2023-05-16 RX ORDER — FLUTICASONE PROPIONATE AND SALMETEROL XINAFOATE 230; 21 UG/1; UG/1
AEROSOL, METERED RESPIRATORY (INHALATION)
Qty: 36 G | Refills: 3 | Status: SHIPPED | OUTPATIENT
Start: 2023-05-16

## 2023-05-16 RX ORDER — AZELASTINE HYDROCHLORIDE AND FLUTICASONE PROPIONATE 137; 50 UG/1; UG/1
SPRAY, METERED NASAL
Qty: 69 G | Refills: 3 | Status: SHIPPED | OUTPATIENT
Start: 2023-05-16

## 2023-05-31 ENCOUNTER — OFFICE VISIT (OUTPATIENT)
Dept: FAMILY MEDICINE CLINIC | Facility: CLINIC | Age: 63
End: 2023-05-31

## 2023-05-31 VITALS
OXYGEN SATURATION: 98 % | SYSTOLIC BLOOD PRESSURE: 130 MMHG | WEIGHT: 208.2 LBS | TEMPERATURE: 98.1 F | HEIGHT: 66 IN | BODY MASS INDEX: 33.46 KG/M2 | DIASTOLIC BLOOD PRESSURE: 78 MMHG | HEART RATE: 91 BPM

## 2023-05-31 DIAGNOSIS — Z12.4 SCREENING FOR CERVICAL CANCER: Primary | ICD-10-CM

## 2023-05-31 DIAGNOSIS — J45.30 MILD PERSISTENT ASTHMA, UNSPECIFIED WHETHER COMPLICATED: ICD-10-CM

## 2023-05-31 DIAGNOSIS — R05.1 ACUTE COUGH: ICD-10-CM

## 2023-05-31 DIAGNOSIS — J30.9 ALLERGIC RHINITIS, UNSPECIFIED SEASONALITY, UNSPECIFIED TRIGGER: ICD-10-CM

## 2023-05-31 DIAGNOSIS — N28.9 RENAL INSUFFICIENCY: ICD-10-CM

## 2023-05-31 LAB
EXPIRATION DATE: NORMAL
FLUAV AG UPPER RESP QL IA.RAPID: NOT DETECTED
FLUBV AG UPPER RESP QL IA.RAPID: NOT DETECTED
INTERNAL CONTROL: NORMAL
Lab: NORMAL
SARS-COV-2 AG UPPER RESP QL IA.RAPID: NOT DETECTED

## 2023-05-31 RX ORDER — PREDNISONE 20 MG/1
40 TABLET ORAL DAILY
Qty: 10 TABLET | Refills: 0 | Status: SHIPPED | OUTPATIENT
Start: 2023-05-31 | End: 2023-06-05

## 2023-05-31 NOTE — PROGRESS NOTES
"Chief Complaint  Sinus congestion, cough and mild shortness of breath    Subjective         Judy Ramey presents to [facility]  Patient presents to the office today with complaints of a cough that is dry, mild headaches as well as some mild shortness of breath.  She states that she was working outside and it was hot.  She states that she began having mild shortness of breath and had to utilize her inhaler.  She does have a history of asthma.  She states that all her symptoms started yesterday  I did review recent lab work with the patient.  She states that she never saw her results.  It did reveal renal insufficiency.  I did tell her to stop her diclofenac and have to take any ibuprofen or naproxen.  All other labs were unremarkable.    Sinusitis         Objective     Vitals:    05/31/23 0912   BP: 130/78   BP Location: Right arm   Patient Position: Sitting   Cuff Size: Adult   Pulse: 91   Temp: 98.1 °F (36.7 °C)   TempSrc: Temporal   SpO2: 98%   Weight: 94.4 kg (208 lb 3.2 oz)   Height: 167.6 cm (66\")      Body mass index is 33.6 kg/m².    BMI is >= 30 and <35. (Class 1 Obesity). The following options were offered after discussion;: nutrition counseling/recommendations        Physical Exam  Vitals reviewed.   Constitutional:       Appearance: Normal appearance.   HENT:      Right Ear: Hearing, tympanic membrane, ear canal and external ear normal.      Left Ear: Hearing, tympanic membrane, ear canal and external ear normal.      Nose: Congestion present. No rhinorrhea.      Mouth/Throat:      Mouth: Mucous membranes are moist.      Pharynx: Oropharynx is clear.   Cardiovascular:      Rate and Rhythm: Normal rate and regular rhythm.      Pulses: Normal pulses.      Heart sounds: Normal heart sounds, S1 normal and S2 normal. No murmur heard.  Pulmonary:      Effort: Pulmonary effort is normal. No respiratory distress.      Breath sounds: Normal breath sounds.      Comments: Dry " cough noted  Skin:     General: Skin is warm and dry.   Neurological:      Mental Status: She is alert and oriented to person, place, and time.   Psychiatric:         Attention and Perception: Attention normal.         Mood and Affect: Mood normal.         Behavior: Behavior normal.          Result Review :   The following data was reviewed by: FERNY Martin on 05/31/2023:      Procedures    Assessment and Plan   Diagnoses and all orders for this visit:    1. Screening for cervical cancer (Primary)  -     Ambulatory Referral to Gynecology    2. Mild persistent asthma, unspecified whether complicated  -     predniSONE (DELTASONE) 20 MG tablet; Take 2 tablets by mouth Daily for 5 days.  Dispense: 10 tablet; Refill: 0    3. Acute cough  -     predniSONE (DELTASONE) 20 MG tablet; Take 2 tablets by mouth Daily for 5 days.  Dispense: 10 tablet; Refill: 0    4. Allergic rhinitis, unspecified seasonality, unspecified trigger  Comments:  Continue your loratadine as well as your Singulair daily    5. Renal insufficiency  Comments:  Discontinue diclofenac.  Do not take ibuprofen or naproxen          Follow Up   Return if symptoms worsen or fail to improve.  Patient was given instructions and counseling regarding her condition or for health maintenance advice. Please see specific information pulled into the AVS if appropriate.

## 2023-08-01 ENCOUNTER — TELEPHONE (OUTPATIENT)
Dept: FAMILY MEDICINE CLINIC | Facility: CLINIC | Age: 63
End: 2023-08-01
Payer: OTHER GOVERNMENT

## 2023-08-01 RX ORDER — B-COMPLEX WITH VITAMIN C
1 TABLET ORAL DAILY
Status: CANCELLED | OUTPATIENT
Start: 2023-08-01

## 2023-08-01 RX ORDER — BISACODYL 5 MG
1 TABLET, DELAYED RELEASE (ENTERIC COATED) ORAL DAILY
Qty: 90 TABLET | Refills: 3 | Status: SHIPPED | OUTPATIENT
Start: 2023-08-01

## 2023-08-31 NOTE — PROGRESS NOTES
"Well Woman Visit    CC: Scheduled annual well gyn visit  Chief Complaint   Patient presents with    Annual Exam     Odor.           Patient is postmenopausal as well as s/p hysterectomy on estrogen only MHT    HPI:   62 y.o.         PCP: does manage PMHx and preventative labs  History: PMHx, Meds, Allergies, PSHx, Social Hx, and POBHx all reviewed and updated.    Patient has concerns she would like to discuss    PHYSICAL EXAM:  /70   Pulse 56   Ht 167.6 cm (66\")   Wt 96.6 kg (213 lb)   Breastfeeding No   BMI 34.38 kg/m²  Not found.  General- NAD, alert and oriented, appropriate  Psych- Normal mood, good memory  Neck- No masses, no thyroid enlargement  CV- Regular rhythm, no murnurs  Resp- CTA to bases, no wheezes  Abdomen- Soft, non distended, non tender, no masses    Breast left-  Bilaterally symmetrical, no masses, non tender, no nipple discharge  Breast right- Bilaterally symmetrical, no masses, non tender, no nipple discharge    External genitalia- Normal female, no lesions, moderate vulvovaginal atrophy  Urethra/meatus- Normal, no masses, non tender  Bladder- Normal, no masses, non tender  Vagina- Normal, moderate atrophy, no lesions, no discharge.    Cvx- Absent  Uterus- Absent  Adnexa- No mass, non tender  Anus/Rectum/Perineum- Not performed    Lymphatic- No palpable neck, axillary, or groin nodes  Ext- No edema, no cyanosis    Skin- No lesions, no rashes, no acanthosis nigricans      ASSESSMENT and PLAN:    Diagnoses and all orders for this visit:    1. Well woman exam with routine gynecological exam (Primary)  -     NuSwab Vaginitis (VG) - Swab, Vagina    2. Genitourinary syndrome of menopause  Assessment & Plan:  Patient with moderate vulvovaginal atrophy on exam.  Patient is experiencing some vaginal dryness.  Patient was counseled at length regarding the risks benefits of localized vaginal estrogen therapy.  Patient will do a trial of vaginal estrogen cream    Orders:  -     estradiol " (ESTRACE VAGINAL) 0.1 MG/GM vaginal cream; Place 0.5 gm PV and massage 0.5 gm to vulva and vestibule at night 2x/week  Dispense: 42.5 g; Refill: 3  -     NuSwab Vaginitis (VG) - Swab, Vagina    3. Menopausal symptoms  Assessment & Plan:  Patient has been on Butrans dermal estrogen only MHT for years.  Patient is doing well on her current dose and would like to continue her medication.  Reviewed the NAMS position statement on MHT from 2022    Orders:  -     estradiol (MINIVELLE, VIVELLE-DOT) 0.05 MG/24HR patch; Place 1 patch on the skin as directed by provider 1 (One) Time Per Week.  Dispense: 12 patch; Refill: 1    4. Vaginal discharge  Assessment & Plan:  Patient is complaining of a slight increase in vaginal discharge as well as a vaginal odor          Preventative:  BREAST HEALTH- Monthly self breast exam importance and how to reviewed. MMG and/or MRI (prn) reviewed per society guidelines and her individual history. Screen: Pt will call to schedule  CERVICAL CANCER Screening- Reviewed current ASCCP guidelines for screening w and wo cotest HPV, age specific.  Screen: Not medically needed  COLON CANCER Screening- Reviewed current medical society guidelines and options.  Screen:  Already up to date  Follow up PCP/Specialist PMHx and Labs      She understands the importance of having any ordered tests to be performed in a timely fashion.  The risks of not performing them include, but are not limited to, advanced cancer stages, bone loss from osteoporosis and/or subsequent increase in morbidity and/or mortality.  She is encouraged to review her results online and/or contact or office if she has questions.     Follow Up:  Return in about 1 year (around 9/5/2024) for Annual physical.            Rosio Thomas MD  09/05/2023    [facility]  [facility] [place] [address]  Dept: [phone]  Dept Fax: [phone]  Loc: [phone]  Loc Fax: [phone]

## 2023-09-01 ENCOUNTER — TRANSCRIBE ORDERS (OUTPATIENT)
Dept: ADMINISTRATIVE | Facility: HOSPITAL | Age: 63
End: 2023-09-01
Payer: OTHER GOVERNMENT

## 2023-09-01 DIAGNOSIS — Z12.31 VISIT FOR SCREENING MAMMOGRAM: Primary | ICD-10-CM

## 2023-09-05 ENCOUNTER — OFFICE VISIT (OUTPATIENT)
Dept: OBSTETRICS AND GYNECOLOGY | Facility: CLINIC | Age: 63
End: 2023-09-05
Payer: OTHER GOVERNMENT

## 2023-09-05 VITALS
WEIGHT: 213 LBS | BODY MASS INDEX: 34.23 KG/M2 | DIASTOLIC BLOOD PRESSURE: 70 MMHG | HEART RATE: 56 BPM | SYSTOLIC BLOOD PRESSURE: 120 MMHG | HEIGHT: 66 IN

## 2023-09-05 DIAGNOSIS — N95.1 MENOPAUSAL SYMPTOMS: ICD-10-CM

## 2023-09-05 DIAGNOSIS — N95.8 GENITOURINARY SYNDROME OF MENOPAUSE: ICD-10-CM

## 2023-09-05 DIAGNOSIS — Z01.419 WELL WOMAN EXAM WITH ROUTINE GYNECOLOGICAL EXAM: Primary | ICD-10-CM

## 2023-09-05 DIAGNOSIS — N89.8 VAGINAL DISCHARGE: ICD-10-CM

## 2023-09-05 RX ORDER — ESTRADIOL 0.1 MG/G
CREAM VAGINAL
Qty: 42.5 G | Refills: 3 | Status: SHIPPED | OUTPATIENT
Start: 2023-09-05

## 2023-09-05 RX ORDER — ESTRADIOL 0.05 MG/D
1 PATCH, EXTENDED RELEASE TRANSDERMAL WEEKLY
Qty: 12 PATCH | Refills: 1 | Status: SHIPPED | OUTPATIENT
Start: 2023-09-05

## 2023-09-05 NOTE — ASSESSMENT & PLAN NOTE
Patient has been on Butrans dermal estrogen only MHT for years.  Patient is doing well on her current dose and would like to continue her medication.  Reviewed the NAMS position statement on MHT from 2022

## 2023-09-05 NOTE — ASSESSMENT & PLAN NOTE
Patient with moderate vulvovaginal atrophy on exam.  Patient is experiencing some vaginal dryness.  Patient was counseled at length regarding the risks benefits of localized vaginal estrogen therapy.  Patient will do a trial of vaginal estrogen cream

## 2023-09-07 LAB
A VAGINAE DNA VAG QL NAA+PROBE: NORMAL SCORE
BVAB2 DNA VAG QL NAA+PROBE: NORMAL SCORE
C ALBICANS DNA VAG QL NAA+PROBE: NEGATIVE
C GLABRATA DNA VAG QL NAA+PROBE: NEGATIVE
MEGA1 DNA VAG QL NAA+PROBE: NORMAL SCORE
T VAGINALIS DNA VAG QL NAA+PROBE: NEGATIVE

## 2023-09-07 RX ORDER — LEVOTHYROXINE SODIUM 125 MCG
125 TABLET ORAL DAILY
Qty: 90 TABLET | Refills: 1 | Status: SHIPPED | OUTPATIENT
Start: 2023-09-07

## 2023-09-07 NOTE — TELEPHONE ENCOUNTER
Caller: Judy Ramey    Relationship: Self    Best call back number:     [phone]       Requested Prescriptions:   Requested Prescriptions     Pending Prescriptions Disp Refills    Synthroid 125 MCG tablet 90 tablet 1     Sig: Take 1 tablet by mouth Daily.        Pharmacy where request should be sent: [facility] [place] [phone] [facility] [phone]      Last office visit with prescribing clinician: 5/31/2023   Last telemedicine visit with prescribing clinician: Visit date not found   Next office visit with prescribing clinician: 10/25/2023       Does the patient have less than a 3 day supply:  [x] Yes  [] No    Would you like a call back once the refill request has been completed: [] Yes [] No    If the office needs to give you a call back, can they leave a voicemail: [] Yes [] No    Tatyana Vidales Rep   09/07/23 13:16 EDT

## 2023-09-28 PROCEDURE — 87635 SARS-COV-2 COVID-19 AMP PRB: CPT | Performed by: FAMILY MEDICINE

## 2023-10-25 ENCOUNTER — LAB (OUTPATIENT)
Dept: LAB | Facility: HOSPITAL | Age: 63
End: 2023-10-25
Payer: OTHER GOVERNMENT

## 2023-10-25 ENCOUNTER — OFFICE VISIT (OUTPATIENT)
Dept: FAMILY MEDICINE CLINIC | Facility: CLINIC | Age: 63
End: 2023-10-25
Payer: OTHER GOVERNMENT

## 2023-10-25 VITALS — TEMPERATURE: 96.6 F | SYSTOLIC BLOOD PRESSURE: 128 MMHG | DIASTOLIC BLOOD PRESSURE: 70 MMHG | HEART RATE: 60 BPM

## 2023-10-25 DIAGNOSIS — N39.3 STRESS INCONTINENCE: ICD-10-CM

## 2023-10-25 DIAGNOSIS — N95.1 MENOPAUSAL SYMPTOMS: ICD-10-CM

## 2023-10-25 DIAGNOSIS — J45.50 SEVERE PERSISTENT ASTHMA WITHOUT COMPLICATION: ICD-10-CM

## 2023-10-25 DIAGNOSIS — F41.9 ANXIETY: ICD-10-CM

## 2023-10-25 DIAGNOSIS — E78.5 HYPERLIPIDEMIA, UNSPECIFIED HYPERLIPIDEMIA TYPE: ICD-10-CM

## 2023-10-25 DIAGNOSIS — R53.83 FATIGUE, UNSPECIFIED TYPE: ICD-10-CM

## 2023-10-25 DIAGNOSIS — F32.0 MAJOR DEPRESSIVE DISORDER, SINGLE EPISODE, MILD: ICD-10-CM

## 2023-10-25 DIAGNOSIS — B37.31 YEAST INFECTION OF THE VAGINA: ICD-10-CM

## 2023-10-25 DIAGNOSIS — K21.9 GASTROESOPHAGEAL REFLUX DISEASE WITHOUT ESOPHAGITIS: ICD-10-CM

## 2023-10-25 DIAGNOSIS — J30.9 ALLERGIC RHINITIS, UNSPECIFIED SEASONALITY, UNSPECIFIED TRIGGER: ICD-10-CM

## 2023-10-25 DIAGNOSIS — Z23 NEED FOR INFLUENZA VACCINATION: Primary | ICD-10-CM

## 2023-10-25 LAB
25(OH)D3 SERPL-MCNC: 23 NG/ML (ref 30–100)
ALBUMIN SERPL-MCNC: 4.4 G/DL (ref 3.5–5.2)
ALBUMIN/GLOB SERPL: 1.9 G/DL
ALP SERPL-CCNC: 99 U/L (ref 39–117)
ALT SERPL W P-5'-P-CCNC: 22 U/L (ref 1–33)
ANION GAP SERPL CALCULATED.3IONS-SCNC: 7 MMOL/L (ref 5–15)
AST SERPL-CCNC: 16 U/L (ref 1–32)
BILIRUB SERPL-MCNC: 0.3 MG/DL (ref 0–1.2)
BUN SERPL-MCNC: 11 MG/DL (ref 8–23)
BUN/CREAT SERPL: 13.8 (ref 7–25)
CALCIUM SPEC-SCNC: 9.5 MG/DL (ref 8.6–10.5)
CHLORIDE SERPL-SCNC: 107 MMOL/L (ref 98–107)
CHOLEST SERPL-MCNC: 211 MG/DL (ref 0–200)
CO2 SERPL-SCNC: 29 MMOL/L (ref 22–29)
CREAT SERPL-MCNC: 0.8 MG/DL (ref 0.57–1)
DEPRECATED RDW RBC AUTO: 37.5 FL (ref 37–54)
EGFRCR SERPLBLD CKD-EPI 2021: 82.9 ML/MIN/1.73
ERYTHROCYTE [DISTWIDTH] IN BLOOD BY AUTOMATED COUNT: 12.3 % (ref 12.3–15.4)
GLOBULIN UR ELPH-MCNC: 2.3 GM/DL
GLUCOSE SERPL-MCNC: 104 MG/DL (ref 65–99)
HCT VFR BLD AUTO: 37 % (ref 34–46.6)
HDLC SERPL-MCNC: 57 MG/DL (ref 40–60)
HGB BLD-MCNC: 12.6 G/DL (ref 12–15.9)
IRON 24H UR-MRATE: 70 MCG/DL (ref 37–145)
IRON SATN MFR SERPL: 19 % (ref 20–50)
LDLC SERPL CALC-MCNC: 137 MG/DL (ref 0–100)
LDLC/HDLC SERPL: 2.37 {RATIO}
MCH RBC QN AUTO: 28.6 PG (ref 26.6–33)
MCHC RBC AUTO-ENTMCNC: 34.1 G/DL (ref 31.5–35.7)
MCV RBC AUTO: 84.1 FL (ref 79–97)
PLATELET # BLD AUTO: 191 10*3/MM3 (ref 140–450)
PMV BLD AUTO: 10.2 FL (ref 6–12)
POTASSIUM SERPL-SCNC: 4.5 MMOL/L (ref 3.5–5.2)
PROT SERPL-MCNC: 6.7 G/DL (ref 6–8.5)
RBC # BLD AUTO: 4.4 10*6/MM3 (ref 3.77–5.28)
SODIUM SERPL-SCNC: 143 MMOL/L (ref 136–145)
TIBC SERPL-MCNC: 362 MCG/DL (ref 298–536)
TRANSFERRIN SERPL-MCNC: 243 MG/DL (ref 200–360)
TRIGL SERPL-MCNC: 94 MG/DL (ref 0–150)
VIT B12 BLD-MCNC: 1066 PG/ML (ref 211–946)
VLDLC SERPL-MCNC: 17 MG/DL (ref 5–40)
WBC NRBC COR # BLD: 3.46 10*3/MM3 (ref 3.4–10.8)

## 2023-10-25 PROCEDURE — 85027 COMPLETE CBC AUTOMATED: CPT

## 2023-10-25 PROCEDURE — 84466 ASSAY OF TRANSFERRIN: CPT

## 2023-10-25 PROCEDURE — 36415 COLL VENOUS BLD VENIPUNCTURE: CPT

## 2023-10-25 PROCEDURE — 82607 VITAMIN B-12: CPT

## 2023-10-25 PROCEDURE — 80061 LIPID PANEL: CPT

## 2023-10-25 PROCEDURE — 83540 ASSAY OF IRON: CPT

## 2023-10-25 PROCEDURE — 80053 COMPREHEN METABOLIC PANEL: CPT

## 2023-10-25 PROCEDURE — 82306 VITAMIN D 25 HYDROXY: CPT

## 2023-10-25 RX ORDER — ESTRADIOL 0.05 MG/D
1 PATCH, EXTENDED RELEASE TRANSDERMAL WEEKLY
Qty: 12 PATCH | Refills: 1 | Status: SHIPPED | OUTPATIENT
Start: 2023-10-25 | End: 2023-10-26 | Stop reason: SDUPTHER

## 2023-10-25 RX ORDER — LORATADINE 10 MG/1
10 TABLET ORAL DAILY
Qty: 90 TABLET | Refills: 11 | Status: SHIPPED | OUTPATIENT
Start: 2023-10-25

## 2023-10-25 RX ORDER — ESCITALOPRAM OXALATE 10 MG/1
10 TABLET ORAL DAILY
Qty: 90 TABLET | Refills: 1 | Status: SHIPPED | OUTPATIENT
Start: 2023-10-25

## 2023-10-25 RX ORDER — TOLTERODINE 2 MG/1
2 CAPSULE, EXTENDED RELEASE ORAL DAILY
Qty: 90 CAPSULE | Refills: 1 | Status: SHIPPED | OUTPATIENT
Start: 2023-10-25

## 2023-10-25 RX ORDER — ALBUTEROL SULFATE 90 UG/1
2 AEROSOL, METERED RESPIRATORY (INHALATION) EVERY 4 HOURS PRN
Qty: 54 G | Refills: 4 | Status: SHIPPED | OUTPATIENT
Start: 2023-10-25

## 2023-10-25 RX ORDER — PANTOPRAZOLE SODIUM 40 MG/1
40 TABLET, DELAYED RELEASE ORAL DAILY
Qty: 90 TABLET | Refills: 4 | Status: SHIPPED | OUTPATIENT
Start: 2023-10-25

## 2023-10-25 RX ORDER — FLUCONAZOLE 150 MG/1
150 TABLET ORAL ONCE
Qty: 2 TABLET | Refills: 0 | Status: SHIPPED | OUTPATIENT
Start: 2023-10-25 | End: 2023-10-25

## 2023-10-25 NOTE — PROGRESS NOTES
Chief Complaint  Hypothyroidism, Anxiety (6 month f/u), Rash (Above left eye lid/), Headache, Fatigue (should), Shoulder Pain (Left ), and Vaginitis    Subjective         Judy Ramey presents to [facility]  Patient presents to the office for 6-month follow-up.  Patient does state that she has had increased fatigue and like to have her labs rechecked.  Patient also states that she has been having a genital yeast infection.  She states that she has discussed this with her GYN and she was given ovules.  She states that she tried this treatment and it did not seem to resolve the yeast infection.  I did discuss using oral Diflucan.  Patient states that she is willing to try this.  She does state that she is needing medication refills.  She also states that he has been having dry skin on her face.  She states it is also located on her eyelids.  I did discuss utilizing Aquaphor as did not mean using medications on the eyelids.  He verbalized understanding.  Patient states that she is also having left shoulder pain.  She states that the diclofenac seem to help in the past.  I did state that I reviewed her previous labs which showed renal insufficiency and that she cannot use diclofenac or ibuprofen or naproxen.  I explained that we would reevaluate with her labs and that we would discuss which she can utilize for pain management once her labs are back.    Hypothyroidism  Associated symptoms include fatigue, headaches and a rash.   Anxiety        Rash  Associated symptoms include fatigue.   Headache  Fatigue  Associated symptoms include fatigue, headaches and a rash.   Vaginitis  Associated symptoms include fatigue, headaches and a rash.        Objective     Vitals:    10/25/23 0958   BP: 128/70   BP Location: Right arm   Patient Position: Sitting   Cuff Size: Large Adult   Pulse: 60   Temp: 96.6 °F (35.9 °C)      There is no height or weight on file to calculate BMI.              Physical Exam  Vitals reviewed.   Constitutional:       Appearance: Normal appearance.   Cardiovascular:      Rate and Rhythm: Normal rate and regular rhythm.      Pulses: Normal pulses.      Heart sounds: Normal heart sounds, S1 normal and S2 normal. No murmur heard.  Pulmonary:      Effort: Pulmonary effort is normal. No respiratory distress.      Breath sounds: Normal breath sounds.   Musculoskeletal:      Left shoulder: Tenderness present. No swelling. Normal range of motion.   Skin:     General: Skin is warm and dry.      Comments: Dry skin noted to eyelids bilaterally   Neurological:      Mental Status: She is alert and oriented to person, place, and time.   Psychiatric:         Attention and Perception: Attention normal.         Mood and Affect: Mood normal.         Behavior: Behavior normal.          Result Review :   The following data was reviewed by: FRENY Martin on 10/25/2023:      Procedures    Assessment and Plan   Diagnoses and all orders for this visit:    1. Need for influenza vaccination (Primary)  -     Fluzone >6 Months (2541-3475)    2. Anxiety  -     escitalopram (LEXAPRO) 10 MG tablet; Take 1 tablet by mouth Daily.  Dispense: 90 tablet; Refill: 1    3. Major depressive disorder, single episode, mild  -     escitalopram (LEXAPRO) 10 MG tablet; Take 1 tablet by mouth Daily.  Dispense: 90 tablet; Refill: 1    4. Severe persistent asthma without complication  -     albuterol sulfate HFA (ProAir HFA) 108 (90 Base) MCG/ACT inhaler; Inhale 2 puffs Every 4 (Four) Hours As Needed for Wheezing.  Dispense: 54 g; Refill: 4    5. Menopausal symptoms  -     estradiol (MINIVELLE, VIVELLE-DOT) 0.05 MG/24HR patch; Place 1 patch on the skin as directed by provider 1 (One) Time Per Week.  Dispense: 12 patch; Refill: 1    6. Fatigue, unspecified type  -     CBC (No Diff); Future  -     Comprehensive Metabolic Panel; Future  -     Vitamin D 25 hydroxy; Future  -     Iron and TIBC; Future  -      Vitamin B12; Future    7. Hyperlipidemia, unspecified hyperlipidemia type  -     CBC (No Diff); Future  -     Comprehensive Metabolic Panel; Future  -     Lipid Panel; Future    8. Gastroesophageal reflux disease without esophagitis  -     pantoprazole (Protonix) 40 MG EC tablet; Take 1 tablet by mouth Daily.  Dispense: 90 tablet; Refill: 4    9. Allergic rhinitis, unspecified seasonality, unspecified trigger  -     loratadine (CLARITIN) 10 MG tablet; Take 1 tablet by mouth Daily.  Dispense: 90 tablet; Refill: 11    10. Yeast infection of the vagina  -     fluconazole (Diflucan) 150 MG tablet; Take 1 tablet by mouth 1 (One) Time for 1 dose. May repeat in 4 days if needed  Dispense: 2 tablet; Refill: 0    11. Stress incontinence  -     tolterodine LA (DETROL LA) 2 MG 24 hr capsule; Take 1 capsule by mouth Daily.  Dispense: 90 capsule; Refill: 1          Follow Up   Return in about 6 months (around 4/25/2024) for Recheck.  Patient was given instructions and counseling regarding her condition or for health maintenance advice. Please see specific information pulled into the AVS if appropriate.

## 2023-10-26 DIAGNOSIS — N95.1 MENOPAUSAL SYMPTOMS: ICD-10-CM

## 2023-10-26 DIAGNOSIS — N95.8 GENITOURINARY SYNDROME OF MENOPAUSE: ICD-10-CM

## 2023-10-26 NOTE — TELEPHONE ENCOUNTER
Caller: [phone] [place] [phone] [place]    Relationship: Pharmacy        Requested Prescriptions:   Requested Prescriptions     Pending Prescriptions Disp Refills    estradiol (MINIVELLE, VIVELLE-DOT) 0.05 MG/24HR patch 12 patch 1     Sig: Place 1 patch on the skin as directed by provider 1 (One) Time Per Week.        Pharmacy where request should be sent: [place] [id number] [phone] [place]     Last office visit with prescribing clinician: 10/25/2023   Last telemedicine visit with prescribing clinician: Visit date not found   Next office visit with prescribing clinician: Visit date not found     Additional details provided by patient: PHARMACY STATES THERE ARE 2 DIFFERENT DIRECTIONS ON THIS ORDER. PHARMACY NEEDS TO KNOW IF THIS SHOULD BE 1 PATCH PER WEEK OR 2 PATCHES PER WEEK PER THE DIRECTIONS SENT TO THEM. PHARMACY ASKS FOR A CALL BACK TO ADVISE.

## 2023-10-27 ENCOUNTER — OFFICE VISIT (OUTPATIENT)
Dept: FAMILY MEDICINE CLINIC | Facility: CLINIC | Age: 63
End: 2023-10-27
Payer: OTHER GOVERNMENT

## 2023-10-27 VITALS — HEIGHT: 66 IN | BODY MASS INDEX: 35.36 KG/M2 | WEIGHT: 220 LBS

## 2023-10-27 DIAGNOSIS — Z20.822 EXPOSURE TO COVID-19 VIRUS: Primary | ICD-10-CM

## 2023-10-27 DIAGNOSIS — U07.1 COVID-19: ICD-10-CM

## 2023-10-27 DIAGNOSIS — J06.9 UPPER RESPIRATORY TRACT INFECTION, UNSPECIFIED TYPE: ICD-10-CM

## 2023-10-27 LAB
EXPIRATION DATE: ABNORMAL
FLUAV AG UPPER RESP QL IA.RAPID: NOT DETECTED
FLUBV AG UPPER RESP QL IA.RAPID: NOT DETECTED
INTERNAL CONTROL: ABNORMAL
Lab: ABNORMAL
SARS-COV-2 AG UPPER RESP QL IA.RAPID: DETECTED

## 2023-10-27 RX ORDER — ESTRADIOL 0.05 MG/D
1 PATCH, EXTENDED RELEASE TRANSDERMAL WEEKLY
Qty: 12 PATCH | Refills: 1 | Status: SHIPPED | OUTPATIENT
Start: 2023-10-27

## 2023-10-27 RX ORDER — AZITHROMYCIN 250 MG/1
TABLET, FILM COATED ORAL
Qty: 6 TABLET | Refills: 0 | Status: SHIPPED | OUTPATIENT
Start: 2023-10-27

## 2023-10-27 RX ORDER — AZITHROMYCIN 250 MG/1
TABLET, FILM COATED ORAL
Qty: 6 TABLET | Refills: 0 | Status: SHIPPED | OUTPATIENT
Start: 2023-10-27 | End: 2023-10-27 | Stop reason: SDUPTHER

## 2023-10-27 NOTE — PROGRESS NOTES
"Chief Complaint  Covid Test Only (Spouse positive for covid, she is experiencing the same symptoms. )    Subjective         Judy Ramey presents to [facility]  Patient presents to the office with her spouse.  She does state that she began having congestion and a sore throat.  Her  did test positive for COVID.  She denies any fevers general body aches, nausea vomiting or diarrhea.         Objective     Vitals:    10/27/23 1108   Weight: 99.8 kg (220 lb)   Height: 167.6 cm (66\")      Body mass index is 35.51 kg/m².             Physical Exam  Vitals reviewed.   Constitutional:       Appearance: Normal appearance.   HENT:      Nose: Congestion and rhinorrhea present.   Cardiovascular:      Heart sounds: S1 normal and S2 normal. No murmur heard.  Pulmonary:      Effort: Pulmonary effort is normal. No respiratory distress.      Breath sounds: Normal breath sounds.   Skin:     General: Skin is warm and dry.   Neurological:      Mental Status: She is alert and oriented to person, place, and time.   Psychiatric:         Attention and Perception: Attention normal.         Mood and Affect: Mood normal.         Behavior: Behavior normal.          Result Review :   The following data was reviewed by: FERNY Martin on 10/27/2023:      Procedures    Assessment and Plan   Diagnoses and all orders for this visit:    1. Exposure to COVID-19 virus (Primary)  -     POCT SARS-CoV-2 Antigen VIPIN + Flu    2. Upper respiratory tract infection, unspecified type  -     azithromycin (Zithromax Z-Carlton) 250 MG tablet; Take 2 tablets by mouth on day 1, then 1 tablet daily on days 2-5  Dispense: 6 tablet; Refill: 0    3. COVID-19  -     azithromycin (Zithromax Z-Carlton) 250 MG tablet; Take 2 tablets by mouth on day 1, then 1 tablet daily on days 2-5  Dispense: 6 tablet; Refill: 0          Follow Up   Return if symptoms worsen or fail to improve.  Patient was given instructions and counseling " regarding her condition or for health maintenance advice. Please see specific information pulled into the AVS if appropriate.

## 2023-11-30 ENCOUNTER — TELEPHONE (OUTPATIENT)
Dept: FAMILY MEDICINE CLINIC | Facility: CLINIC | Age: 63
End: 2023-11-30
Payer: OTHER GOVERNMENT

## 2023-11-30 NOTE — TELEPHONE ENCOUNTER
Caller: Judy Ramey    Relationship: Self    Best call back number:     [phone]       What medication are you requesting: LIDOCAINE PATCHES 5%    What are your current symptoms: KNEE AND BACK PAIN    How long have you been experiencing symptoms: A LONG TIME    Have you had these symptoms before:    [x] Yes  [] No    Have you been treated for these symptoms before:   [x] Yes  [] No    If a prescription is needed, what is your preferred pharmacy and phone number: [facility] - [address] [phone] [facility] [phone]      Additional notes: PHARMACY IS STATING THE PRESCRIPTION NEEDS TO BE WRITTEN CORRECTLY    PLEASE CALL WHEN PRESCRIPTION HAS BEEN SENT

## 2023-12-01 DIAGNOSIS — M54.50 CHRONIC BILATERAL LOW BACK PAIN, UNSPECIFIED WHETHER SCIATICA PRESENT: ICD-10-CM

## 2023-12-01 DIAGNOSIS — G89.29 CHRONIC BILATERAL LOW BACK PAIN, UNSPECIFIED WHETHER SCIATICA PRESENT: Primary | ICD-10-CM

## 2023-12-01 DIAGNOSIS — M54.50 CHRONIC BILATERAL LOW BACK PAIN, UNSPECIFIED WHETHER SCIATICA PRESENT: Primary | ICD-10-CM

## 2023-12-01 DIAGNOSIS — G89.29 CHRONIC BILATERAL LOW BACK PAIN, UNSPECIFIED WHETHER SCIATICA PRESENT: ICD-10-CM

## 2023-12-01 RX ORDER — LIDOCAINE 50 MG/G
1 PATCH TOPICAL EVERY 24 HOURS
Qty: 90 PATCH | Refills: 3 | Status: SHIPPED | OUTPATIENT
Start: 2023-12-01

## 2023-12-01 RX ORDER — LIDOCAINE 50 MG/G
1 PATCH TOPICAL EVERY 24 HOURS
Qty: 90 PATCH | Refills: 3 | Status: SHIPPED | OUTPATIENT
Start: 2023-12-01 | End: 2023-12-01 | Stop reason: SDUPTHER

## 2023-12-01 NOTE — TELEPHONE ENCOUNTER
Medication sent by Milton, sent to incorrect pharmacy. Judy called and requesting it be resent to DOD. Medication resent

## 2023-12-01 NOTE — TELEPHONE ENCOUNTER
Called and spoke to Judy,  advised per Milton,   Patches have been sent to the pharmacy for the patient   Judy verbalized understanding

## 2023-12-07 ENCOUNTER — OFFICE VISIT (OUTPATIENT)
Dept: PULMONOLOGY | Facility: CLINIC | Age: 63
End: 2023-12-07
Payer: OTHER GOVERNMENT

## 2023-12-07 VITALS
TEMPERATURE: 98.7 F | BODY MASS INDEX: 35.03 KG/M2 | HEIGHT: 66 IN | DIASTOLIC BLOOD PRESSURE: 61 MMHG | RESPIRATION RATE: 17 BRPM | SYSTOLIC BLOOD PRESSURE: 126 MMHG | WEIGHT: 218 LBS | HEART RATE: 64 BPM | OXYGEN SATURATION: 97 %

## 2023-12-07 DIAGNOSIS — J32.0 CHRONIC MAXILLARY SINUSITIS: Primary | ICD-10-CM

## 2023-12-07 DIAGNOSIS — J45.50 SEVERE PERSISTENT ASTHMA WITHOUT COMPLICATION: ICD-10-CM

## 2023-12-07 DIAGNOSIS — J34.3 HYPERTROPHY OF NASAL TURBINATES: ICD-10-CM

## 2023-12-07 DIAGNOSIS — J30.2 SEASONAL ALLERGIES: ICD-10-CM

## 2023-12-07 RX ORDER — FLUTICASONE PROPIONATE 50 MCG
2 SPRAY, SUSPENSION (ML) NASAL DAILY
Qty: 50 G | Refills: 4 | Status: SHIPPED | OUTPATIENT
Start: 2023-12-07

## 2023-12-07 RX ORDER — FLUTICASONE PROPIONATE AND SALMETEROL XINAFOATE 230; 21 UG/1; UG/1
2 AEROSOL, METERED RESPIRATORY (INHALATION)
Qty: 36 G | Refills: 4 | Status: SHIPPED | OUTPATIENT
Start: 2023-12-07

## 2023-12-07 RX ORDER — AZELASTINE 1 MG/ML
2 SPRAY, METERED NASAL 2 TIMES DAILY
Qty: 90 ML | Refills: 4 | Status: SHIPPED | OUTPATIENT
Start: 2023-12-07

## 2023-12-07 RX ORDER — FLUTICASONE PROPIONATE AND SALMETEROL XINAFOATE 230; 21 UG/1; UG/1
2 AEROSOL, METERED RESPIRATORY (INHALATION) 2 TIMES DAILY
Qty: 36 G | Refills: 3 | Status: SHIPPED | OUTPATIENT
Start: 2023-12-07

## 2023-12-19 ENCOUNTER — HOSPITAL ENCOUNTER (OUTPATIENT)
Dept: BONE DENSITY | Facility: HOSPITAL | Age: 63
Discharge: HOME | End: 2023-12-19
Payer: OTHER GOVERNMENT

## 2023-12-19 ENCOUNTER — HOSPITAL ENCOUNTER (OUTPATIENT)
Dept: MAMMOGRAPHY | Facility: HOSPITAL | Age: 63
Discharge: HOME | End: 2023-12-19
Payer: OTHER GOVERNMENT

## 2023-12-19 DIAGNOSIS — Z12.31 ENCOUNTER FOR SCREENING MAMMOGRAM FOR MALIGNANT NEOPLASM OF BREAST: ICD-10-CM

## 2023-12-19 DIAGNOSIS — Z78.0 POSTMENOPAUSAL: ICD-10-CM

## 2023-12-19 PROCEDURE — 77067 SCR MAMMO BI INCL CAD: CPT

## 2023-12-19 PROCEDURE — 77080 DXA BONE DENSITY AXIAL: CPT

## 2023-12-19 PROCEDURE — 77063 BREAST TOMOSYNTHESIS BI: CPT

## 2023-12-26 ENCOUNTER — HOSPITAL ENCOUNTER (OUTPATIENT)
Dept: GENERAL RADIOLOGY | Facility: HOSPITAL | Age: 63
Discharge: HOME | End: 2023-12-26
Payer: OTHER GOVERNMENT

## 2023-12-26 ENCOUNTER — HOSPITAL ENCOUNTER (OUTPATIENT)
Dept: GENERAL RADIOLOGY | Facility: HOSPITAL | Age: 63
Discharge: HOME | End: 2023-12-26
Admitting: NURSE PRACTITIONER
Payer: OTHER GOVERNMENT

## 2023-12-26 ENCOUNTER — TELEPHONE (OUTPATIENT)
Dept: FAMILY MEDICINE CLINIC | Facility: CLINIC | Age: 63
End: 2023-12-26

## 2023-12-26 ENCOUNTER — OFFICE VISIT (OUTPATIENT)
Dept: FAMILY MEDICINE CLINIC | Facility: CLINIC | Age: 63
End: 2023-12-26
Payer: OTHER GOVERNMENT

## 2023-12-26 VITALS
WEIGHT: 218.6 LBS | DIASTOLIC BLOOD PRESSURE: 64 MMHG | HEIGHT: 66 IN | HEART RATE: 71 BPM | SYSTOLIC BLOOD PRESSURE: 128 MMHG | OXYGEN SATURATION: 97 % | TEMPERATURE: 97.6 F | BODY MASS INDEX: 35.13 KG/M2

## 2023-12-26 DIAGNOSIS — M79.672 FOOT PAIN, BILATERAL: ICD-10-CM

## 2023-12-26 DIAGNOSIS — I83.90 VARICOSE VEINS OF CALF: ICD-10-CM

## 2023-12-26 DIAGNOSIS — M79.672 FOOT PAIN, BILATERAL: Primary | ICD-10-CM

## 2023-12-26 DIAGNOSIS — R73.01 IMPAIRED FASTING GLUCOSE: ICD-10-CM

## 2023-12-26 DIAGNOSIS — M79.671 FOOT PAIN, BILATERAL: Primary | ICD-10-CM

## 2023-12-26 DIAGNOSIS — M79.671 FOOT PAIN, BILATERAL: ICD-10-CM

## 2023-12-26 LAB
EXPIRATION DATE: NORMAL
HBA1C MFR BLD: 5.5 % (ref 4.5–5.7)
Lab: NORMAL

## 2023-12-26 PROCEDURE — 73610 X-RAY EXAM OF ANKLE: CPT

## 2023-12-26 PROCEDURE — 73630 X-RAY EXAM OF FOOT: CPT

## 2023-12-26 NOTE — PROGRESS NOTES
"Chief Complaint  Leg Pain    Subjective         Judy Ramey presents to [facility]  Presents to the office today with bilateral lower leg pain.  She states that she received a massage and when this area was being massages when she first noticed the pain.  She denies any swelling or redness to the lower legs.  Patient also complains of bilateral foot pain.  She states that the foot pain has been present for approximately 3 weeks.  She denies any swelling or redness of her feet.    Patient is also states that she is concerned over increased thirst.  I did review her previous lab work and noted that she had impaired fasting glucose.  I did explain I want to check her A1c to ensure that this had not went up.  Patient verbalized understanding.    Leg Pain          Objective     Vitals:    12/26/23 1111   BP: 128/64   BP Location: Right arm   Patient Position: Sitting   Cuff Size: Adult   Pulse: 71   Temp: 97.6 °F (36.4 °C)   TempSrc: Temporal   SpO2: 97%   Weight: 99.2 kg (218 lb 9.6 oz)   Height: 167.6 cm (66\")      Body mass index is 35.28 kg/m².             Physical Exam  Vitals reviewed.   Constitutional:       Appearance: Normal appearance.   Cardiovascular:      Rate and Rhythm: Normal rate and regular rhythm.      Pulses: Normal pulses.      Heart sounds: Normal heart sounds, S1 normal and S2 normal. No murmur heard.  Pulmonary:      Effort: Pulmonary effort is normal. No respiratory distress.      Breath sounds: Normal breath sounds.   Musculoskeletal:      Comments: Varicose veins noted bilaterally.    Skin:     General: Skin is warm and dry.   Neurological:      Mental Status: She is alert and oriented to person, place, and time.   Psychiatric:         Attention and Perception: Attention normal.         Mood and Affect: Mood normal.         Behavior: Behavior normal.          Result Review :   The following data was reviewed by: FERNY Martin on " 12/26/2023:      Procedures    Assessment and Plan   Diagnoses and all orders for this visit:    1. Foot pain, bilateral (Primary)  -     XR Foot 3+ View Left; Future  -     XR Foot 3+ View Right; Future    2. Varicose veins of calf  Comments:  prescription for below knee compression hoses provided to patient    3. Impaired fasting glucose  -     POC Glycosylated Hemoglobin (Hb A1C)    To the bilateral foot pain being present for 3 weeks I did explain I want her to have x-rays completed.  I did discuss possible differential diagnosis including plantar fasciitis, spurring and osteoarthritis.  Patient's area of tenderness on her lower legs was the areas of the varicose veins.  I did discuss this with the patient and I did discuss compression stockings.  Patient states that she is willing to try this.   A1c is 5.5%.    Follow Up   Return if symptoms worsen or fail to improve.  Patient was given instructions and counseling regarding her condition or for health maintenance advice. Please see specific information pulled into the AVS if appropriate.

## 2023-12-26 NOTE — TELEPHONE ENCOUNTER
Caller: Judy Ramey    Relationship: Self    Best call back number: [phone]     What medication are you requesting: COMPRESSION SOCKS    If a prescription is needed, what is your preferred pharmacy and phone number:    [facility]  FAX: [phone]     Additional notes: PER PATIENT, ADDITIONAL INFORMATION IS NEEDED- COPY OF PRESCRIPTION, KNEE OR THIGH HIGHS, COPY OF INSURANCE.

## 2024-01-03 ENCOUNTER — TELEPHONE (OUTPATIENT)
Dept: FAMILY MEDICINE CLINIC | Facility: CLINIC | Age: 64
End: 2024-01-03
Payer: OTHER GOVERNMENT

## 2024-01-03 NOTE — TELEPHONE ENCOUNTER
Informed patient prescription was sent to [facility]. Patient is reaching out to see if they received. Provided patient with their contact information.

## 2024-01-03 NOTE — TELEPHONE ENCOUNTER
Called Judy, left message to RTC.     Order and prescription was send to the [facility] on  12/26/2023

## 2024-01-03 NOTE — TELEPHONE ENCOUNTER
Caller: Judy Ramey    Relationship: Self    Best call back number: [phone]     What is the best time to reach you: ANY    Who are you requesting to speak with (clinical staff, provider,  specific staff member): CLINICAL    What was the call regarding: REQUESTING UPDATE ON COMPRESSION TIGHTS ORDER.

## 2024-02-06 ENCOUNTER — TELEPHONE (OUTPATIENT)
Dept: FAMILY MEDICINE CLINIC | Facility: CLINIC | Age: 64
End: 2024-02-06
Payer: OTHER GOVERNMENT

## 2024-02-21 DIAGNOSIS — J45.50 SEVERE PERSISTENT ASTHMA WITHOUT COMPLICATION: Primary | ICD-10-CM

## 2024-02-22 ENCOUNTER — TELEPHONE (OUTPATIENT)
Dept: FAMILY MEDICINE CLINIC | Facility: CLINIC | Age: 64
End: 2024-02-22
Payer: OTHER GOVERNMENT

## 2024-02-22 DIAGNOSIS — J45.50 SEVERE PERSISTENT ASTHMA WITHOUT COMPLICATION: ICD-10-CM

## 2024-02-22 RX ORDER — DUPILUMAB 200 MG/1.14ML
200 INJECTION, SOLUTION SUBCUTANEOUS
Qty: 2.28 ML | Refills: 11 | Status: SHIPPED | OUTPATIENT
Start: 2024-02-22 | End: 2024-02-22 | Stop reason: SDUPTHER

## 2024-02-22 RX ORDER — DUPILUMAB 200 MG/1.14ML
200 INJECTION, SOLUTION SUBCUTANEOUS
Qty: 2.28 ML | Refills: 11 | Status: SHIPPED | OUTPATIENT
Start: 2024-02-22 | End: 2024-02-26 | Stop reason: SDUPTHER

## 2024-02-22 NOTE — TELEPHONE ENCOUNTER
"Caller: Judy Ramey    Relationship: Self    Best call back number:     [phone]       What is the medical concern/diagnosis: REMOVAL OF FILM FROM LEFT EYE, (\"YAGCAPSOLOTOMY\")    What specialty or service is being requested: EYE SURGEON    What is the provider, practice or medical service name: DR JEFFREY RAMESH     What is the office location:   [address] [place] [address] [place] [address]       What is the office phone number: [phone]       Any additional details: PATIENT STATES THAT SHE IS ALREADY SCHEDULED FOR THIS APPOINTMENT ON MONDAY 2.26.24 AND NEEDS THE REFERRAL BEFORE THEN      "

## 2024-02-26 ENCOUNTER — TELEPHONE (OUTPATIENT)
Dept: FAMILY MEDICINE CLINIC | Facility: CLINIC | Age: 64
End: 2024-02-26
Payer: OTHER GOVERNMENT

## 2024-02-26 DIAGNOSIS — H26.493 BILATERAL POSTERIOR CAPSULAR OPACIFICATION: Primary | ICD-10-CM

## 2024-02-26 DIAGNOSIS — J45.50 SEVERE PERSISTENT ASTHMA WITHOUT COMPLICATION: ICD-10-CM

## 2024-02-26 RX ORDER — DUPILUMAB 200 MG/1.14ML
200 INJECTION, SOLUTION SUBCUTANEOUS
Qty: 2.28 ML | Refills: 11 | Status: SHIPPED | OUTPATIENT
Start: 2024-02-26 | End: 2024-03-01 | Stop reason: SDUPTHER

## 2024-02-26 NOTE — TELEPHONE ENCOUNTER
Caller: Judy Ramey    Relationship: Self    Best call back number: [phone]     What specialty or service is being requested: OPHTHALMOLOGY    What is the provider, practice or medical service name: [facility]    What is the office location: [address] [place] [address] [address] [place] [address]    What is the office phone number: [phone]    Any additional details: PATIENT STATES THAT SHE HAD AN APPOINTMENT WITH AN OPHTHALMOLOGIST TODAY (02.26.2024) BUT SHE WAS NOT ABLE TO STAY FOR HER APPOINTMENT. PATIENT STATES THAT SHE WAS NOT COMFORTABLE WITH THE FACILITY.PATIENT WOULD LIKE TO HAVE REFERRAL SWITCHED TO [facility] [place].

## 2024-02-26 NOTE — TELEPHONE ENCOUNTER
DONA FROM [facility] CALLED SAYING THAT THE PATIENT HAS AN APPOINTMENT TODAY AT 1:00 PM. THIS APPOINTMENT IS FOR POST CATARACT SURGERY. CPT CODE: 00101 DX CODE: [id number]. LET ME KNOW WHEN THE ORDER IS PLACED I NEED TO FAX THE ORDER TO DONA [phone]. THANK YOU

## 2024-03-01 DIAGNOSIS — J45.50 SEVERE PERSISTENT ASTHMA WITHOUT COMPLICATION: Primary | ICD-10-CM

## 2024-03-01 RX ORDER — DUPILUMAB 200 MG/1.14ML
200 INJECTION, SOLUTION SUBCUTANEOUS
Qty: 2.52 ML | Refills: 11 | Status: SHIPPED | OUTPATIENT
Start: 2024-03-01

## 2024-03-04 ENCOUNTER — TELEPHONE (OUTPATIENT)
Dept: FAMILY MEDICINE CLINIC | Facility: CLINIC | Age: 64
End: 2024-03-04

## 2024-03-04 ENCOUNTER — OFFICE VISIT (OUTPATIENT)
Dept: FAMILY MEDICINE CLINIC | Facility: CLINIC | Age: 64
End: 2024-03-04
Payer: OTHER GOVERNMENT

## 2024-03-04 ENCOUNTER — TELEPHONE (OUTPATIENT)
Dept: PULMONOLOGY | Facility: CLINIC | Age: 64
End: 2024-03-04
Payer: OTHER GOVERNMENT

## 2024-03-04 VITALS
DIASTOLIC BLOOD PRESSURE: 74 MMHG | BODY MASS INDEX: 34.39 KG/M2 | OXYGEN SATURATION: 98 % | HEIGHT: 66 IN | TEMPERATURE: 97 F | WEIGHT: 214 LBS | HEART RATE: 70 BPM | SYSTOLIC BLOOD PRESSURE: 128 MMHG

## 2024-03-04 DIAGNOSIS — M19.90 OSTEOARTHRITIS, UNSPECIFIED OSTEOARTHRITIS TYPE, UNSPECIFIED SITE: Primary | ICD-10-CM

## 2024-03-04 DIAGNOSIS — J30.9 ALLERGIC RHINITIS, UNSPECIFIED SEASONALITY, UNSPECIFIED TRIGGER: ICD-10-CM

## 2024-03-04 DIAGNOSIS — K21.9 GASTROESOPHAGEAL REFLUX DISEASE, UNSPECIFIED WHETHER ESOPHAGITIS PRESENT: ICD-10-CM

## 2024-03-04 RX ORDER — FAMOTIDINE 20 MG/1
20 TABLET, FILM COATED ORAL NIGHTLY PRN
Qty: 90 TABLET | Refills: 1 | Status: SHIPPED | OUTPATIENT
Start: 2024-03-04 | End: 2024-03-07 | Stop reason: SDUPTHER

## 2024-03-04 RX ORDER — CARBOXYMETHYLCELLULOSE SODIUM 0.5 G/100ML
2 SOLUTION/ DROPS OPHTHALMIC DAILY PRN
Qty: 70 EACH | Refills: 1 | Status: SHIPPED | OUTPATIENT
Start: 2024-03-04 | End: 2024-03-04 | Stop reason: SDUPTHER

## 2024-03-04 RX ORDER — FLUTICASONE PROPIONATE 50 MCG
2 SPRAY, SUSPENSION (ML) NASAL DAILY
Qty: 50 G | Refills: 4 | Status: SHIPPED | OUTPATIENT
Start: 2024-03-04 | End: 2024-03-07 | Stop reason: SDUPTHER

## 2024-03-04 RX ORDER — CARBOXYMETHYLCELLULOSE SODIUM 0.5 G/100ML
2 SOLUTION/ DROPS OPHTHALMIC DAILY PRN
Qty: 70 EACH | Refills: 1 | Status: SHIPPED | OUTPATIENT
Start: 2024-03-04 | End: 2024-03-07 | Stop reason: SDUPTHER

## 2024-03-04 RX ORDER — LIFITEGRAST 50 MG/ML
1 SOLUTION/ DROPS OPHTHALMIC 2 TIMES DAILY
Qty: 15 EACH | Refills: 2 | Status: SHIPPED | OUTPATIENT
Start: 2024-03-04 | End: 2024-03-07 | Stop reason: SDUPTHER

## 2024-03-04 RX ORDER — DICLOFENAC SODIUM 75 MG/1
75 TABLET, DELAYED RELEASE ORAL 2 TIMES DAILY
Qty: 180 TABLET | Refills: 0 | Status: SHIPPED | OUTPATIENT
Start: 2024-03-04 | End: 2024-03-07 | Stop reason: SDUPTHER

## 2024-03-04 RX ORDER — LIFITEGRAST 50 MG/ML
1 SOLUTION/ DROPS OPHTHALMIC 2 TIMES DAILY
Qty: 15 EACH | Refills: 2 | Status: SHIPPED | OUTPATIENT
Start: 2024-03-04 | End: 2024-03-04 | Stop reason: SDUPTHER

## 2024-03-04 RX ORDER — DICLOFENAC SODIUM 75 MG/1
75 TABLET, DELAYED RELEASE ORAL 2 TIMES DAILY
COMMUNITY
End: 2024-03-04 | Stop reason: SDUPTHER

## 2024-03-04 NOTE — TELEPHONE ENCOUNTER
Caller: Judy Ramey    Relationship: Self    Best call back number: [phone]    Requested Prescriptions:   Requested Prescriptions     Pending Prescriptions Disp Refills    Lubricating Plus Eye Drops 0.5 % solution 70 each 1     Sig: Administer 2 drops to both eyes Daily As Needed for Dry Eyes.    Xiidra 5 % ophthalmic solution 15 each 2     Sig: Administer 1 drop to both eyes 2 (Two) Times a Day.        Pharmacy where request should be sent: [facility] - [address] [phone] [facility] [phone]      Last office visit with prescribing clinician: 3/4/2024   Last telemedicine visit with prescribing clinician: Visit date not found   Next office visit with prescribing clinician: 3/13/2024     Additional details provided by patient: THESE TWO MEDICATIONS WERE SENT TO [facility], BUT IT MUST BE SENT TO [facility].     Would you like a call back once the refill request has been completed: [x] Yes [] No    If the office needs to give you a call back, can they leave a voicemail: [x] Yes [] No    Tatyana Sandoval Rep   03/04/24 10:26 EST

## 2024-03-04 NOTE — TELEPHONE ENCOUNTER
Patient called she is stating the pharmacy is saying they do not have her Dupixent. I let her know the pharmacy confirmed it and we sent everything over. She is frustrated because she is having to go back and forth with the pharmacy and our office. She stated she wanted to speak to someone who can explain what is going on that is causing trouble with the medication. Please advise, thank you.

## 2024-03-04 NOTE — PROGRESS NOTES
"Chief Complaint  Cyst (Knot on the right arm for 2 weeks )    Subjective         Judy Ramey presents to [facility]  Patient presents to the office today with complaints over swelling to her right lower arm.  She states that 2 weeks ago she tried to grab a garment out of the dryer that was still turning.  She states that her arm got caught and she pulled it out immediately.  She states that she did have swelling to the posterior lower arm.  She states the swelling has improved significantly but still has a large nodule.  I did explain to the patient that since the area is improving we will continue to monitor.  I did measure the nodule for future reference.  I explained that the nodule has not improved in the next week or 2 then we would obtain an ultrasound.  She does have full range of motion to her arm and denies any pain at this time.  She does state that she is needing refills on her medications.  She denies any other concerns or complaints at this time    Cyst         Objective     Vitals:    03/04/24 0753   BP: 128/74   BP Location: Right arm   Patient Position: Sitting   Cuff Size: Adult   Pulse: 70   Temp: 97 °F (36.1 °C)   TempSrc: Temporal   SpO2: 98%   Weight: 97.1 kg (214 lb)   Height: 167.6 cm (66\")      Body mass index is 34.54 kg/m².             Physical Exam  Vitals reviewed.   Constitutional:       Appearance: Normal appearance.   Cardiovascular:      Rate and Rhythm: Normal rate and regular rhythm.      Pulses: Normal pulses.      Heart sounds: Normal heart sounds, S1 normal and S2 normal. No murmur heard.  Pulmonary:      Effort: Pulmonary effort is normal. No respiratory distress.      Breath sounds: Normal breath sounds.   Musculoskeletal:      Right upper arm: Swelling and tenderness present.      Comments: 5 x 3 cm hematoma noted to posterior lower arm. No drainage noted.  Full ROM noted to arm   Skin:     General: Skin is warm and dry.   Neurological: "      Mental Status: She is alert and oriented to person, place, and time.   Psychiatric:         Attention and Perception: Attention normal.         Mood and Affect: Mood normal.         Behavior: Behavior normal.          Result Review :   The following data was reviewed by: FERNY Martin on 03/04/2024:      Procedures    Assessment and Plan   Diagnoses and all orders for this visit:    1. Osteoarthritis, unspecified osteoarthritis type, unspecified site (Primary)  -     diclofenac (VOLTAREN) 75 MG EC tablet; Take 1 tablet by mouth 2 (Two) Times a Day.  Dispense: 180 tablet; Refill: 0    2. Gastroesophageal reflux disease, unspecified whether esophagitis present  -     famotidine (PEPCID) 20 MG tablet; Take 1 tablet by mouth At Night As Needed for Heartburn.  Dispense: 90 tablet; Refill: 1    3. Allergic rhinitis, unspecified seasonality, unspecified trigger  -     fluticasone (FLONASE) 50 MCG/ACT nasal spray; 2 sprays into the nostril(s) as directed by provider Daily.  Dispense: 50 g; Refill: 4    Other orders  -     Lubricating Plus Eye Drops 0.5 % solution; Administer 2 drops to both eyes Daily As Needed for Dry Eyes.  Dispense: 70 each; Refill: 1  -     Xiidra 5 % ophthalmic solution; Administer 1 drop to both eyes 2 (Two) Times a Day.  Dispense: 15 each; Refill: 2          Follow Up   Return if symptoms worsen or fail to improve, for Next scheduled follow up.  Patient was given instructions and counseling regarding her condition or for health maintenance advice. Please see specific information pulled into the AVS if appropriate.

## 2024-03-07 DIAGNOSIS — M19.90 OSTEOARTHRITIS, UNSPECIFIED OSTEOARTHRITIS TYPE, UNSPECIFIED SITE: ICD-10-CM

## 2024-03-07 DIAGNOSIS — K21.9 GASTROESOPHAGEAL REFLUX DISEASE, UNSPECIFIED WHETHER ESOPHAGITIS PRESENT: ICD-10-CM

## 2024-03-07 DIAGNOSIS — J30.9 ALLERGIC RHINITIS, UNSPECIFIED SEASONALITY, UNSPECIFIED TRIGGER: ICD-10-CM

## 2024-03-07 DIAGNOSIS — N95.1 MENOPAUSAL SYMPTOMS: ICD-10-CM

## 2024-03-07 RX ORDER — FLUTICASONE PROPIONATE 50 MCG
2 SPRAY, SUSPENSION (ML) NASAL DAILY
Qty: 50 G | Refills: 4 | Status: SHIPPED | OUTPATIENT
Start: 2024-03-07

## 2024-03-07 RX ORDER — DICLOFENAC SODIUM 75 MG/1
75 TABLET, DELAYED RELEASE ORAL 2 TIMES DAILY
Qty: 180 TABLET | Refills: 0 | Status: SHIPPED | OUTPATIENT
Start: 2024-03-07

## 2024-03-07 RX ORDER — FAMOTIDINE 20 MG/1
20 TABLET, FILM COATED ORAL NIGHTLY PRN
Qty: 90 TABLET | Refills: 1 | Status: SHIPPED | OUTPATIENT
Start: 2024-03-07

## 2024-03-07 RX ORDER — CARBOXYMETHYLCELLULOSE SODIUM 0.5 G/100ML
2 SOLUTION/ DROPS OPHTHALMIC DAILY PRN
Qty: 70 EACH | Refills: 1 | Status: SHIPPED | OUTPATIENT
Start: 2024-03-07

## 2024-03-07 RX ORDER — LIFITEGRAST 50 MG/ML
1 SOLUTION/ DROPS OPHTHALMIC 2 TIMES DAILY
Qty: 15 EACH | Refills: 2 | Status: SHIPPED | OUTPATIENT
Start: 2024-03-07

## 2024-03-08 RX ORDER — ESTRADIOL 0.05 MG/D
1 PATCH, EXTENDED RELEASE TRANSDERMAL WEEKLY
Qty: 12 PATCH | Refills: 1 | Status: SHIPPED | OUTPATIENT
Start: 2024-03-08

## 2024-03-13 ENCOUNTER — OFFICE VISIT (OUTPATIENT)
Dept: FAMILY MEDICINE CLINIC | Facility: CLINIC | Age: 64
End: 2024-03-13
Payer: OTHER GOVERNMENT

## 2024-03-13 VITALS
BODY MASS INDEX: 34.58 KG/M2 | HEIGHT: 66 IN | OXYGEN SATURATION: 98 % | SYSTOLIC BLOOD PRESSURE: 114 MMHG | TEMPERATURE: 96 F | HEART RATE: 70 BPM | WEIGHT: 215.2 LBS | DIASTOLIC BLOOD PRESSURE: 70 MMHG

## 2024-03-13 DIAGNOSIS — M79.89 SOFT TISSUE MASS: ICD-10-CM

## 2024-03-13 DIAGNOSIS — E03.9 HYPOTHYROIDISM, UNSPECIFIED TYPE: Primary | ICD-10-CM

## 2024-03-13 DIAGNOSIS — N39.3 STRESS INCONTINENCE: ICD-10-CM

## 2024-03-13 DIAGNOSIS — J30.9 ALLERGIC RHINITIS, UNSPECIFIED SEASONALITY, UNSPECIFIED TRIGGER: ICD-10-CM

## 2024-03-13 DIAGNOSIS — K21.9 GASTROESOPHAGEAL REFLUX DISEASE, UNSPECIFIED WHETHER ESOPHAGITIS PRESENT: ICD-10-CM

## 2024-03-13 DIAGNOSIS — F41.9 ANXIETY: ICD-10-CM

## 2024-03-13 DIAGNOSIS — F32.0 MAJOR DEPRESSIVE DISORDER, SINGLE EPISODE, MILD: ICD-10-CM

## 2024-03-13 DIAGNOSIS — H04.123 DRY EYES: ICD-10-CM

## 2024-03-13 DIAGNOSIS — M54.41 CHRONIC RIGHT-SIDED LOW BACK PAIN WITH RIGHT-SIDED SCIATICA: ICD-10-CM

## 2024-03-13 DIAGNOSIS — G89.29 CHRONIC RIGHT-SIDED LOW BACK PAIN WITH RIGHT-SIDED SCIATICA: ICD-10-CM

## 2024-03-13 DIAGNOSIS — M19.90 OSTEOARTHRITIS, UNSPECIFIED OSTEOARTHRITIS TYPE, UNSPECIFIED SITE: ICD-10-CM

## 2024-03-13 RX ORDER — FAMOTIDINE 20 MG/1
20 TABLET, FILM COATED ORAL NIGHTLY PRN
Qty: 90 TABLET | Refills: 1 | Status: SHIPPED | OUTPATIENT
Start: 2024-03-13

## 2024-03-13 RX ORDER — TOLTERODINE 2 MG/1
2 CAPSULE, EXTENDED RELEASE ORAL DAILY
Qty: 90 CAPSULE | Refills: 1 | Status: SHIPPED | OUTPATIENT
Start: 2024-03-13

## 2024-03-13 RX ORDER — ESCITALOPRAM OXALATE 10 MG/1
10 TABLET ORAL DAILY
Qty: 90 TABLET | Refills: 1 | Status: SHIPPED | OUTPATIENT
Start: 2024-03-13

## 2024-03-13 RX ORDER — DICLOFENAC SODIUM 75 MG/1
75 TABLET, DELAYED RELEASE ORAL 2 TIMES DAILY
Qty: 180 TABLET | Refills: 0 | Status: SHIPPED | OUTPATIENT
Start: 2024-03-13

## 2024-03-13 RX ORDER — LEVOTHYROXINE SODIUM 125 MCG
125 TABLET ORAL DAILY
Qty: 90 TABLET | Refills: 1 | Status: SHIPPED | OUTPATIENT
Start: 2024-03-13

## 2024-03-13 RX ORDER — CARBOXYMETHYLCELLULOSE SODIUM 0.5 G/100ML
2 SOLUTION/ DROPS OPHTHALMIC DAILY PRN
Qty: 70 EACH | Refills: 1 | Status: SHIPPED | OUTPATIENT
Start: 2024-03-13

## 2024-03-13 RX ORDER — FLUTICASONE PROPIONATE 50 MCG
2 SPRAY, SUSPENSION (ML) NASAL DAILY
Qty: 50 G | Refills: 4 | Status: SHIPPED | OUTPATIENT
Start: 2024-03-13

## 2024-03-13 RX ORDER — AMITRIPTYLINE HYDROCHLORIDE 25 MG/1
25 TABLET, FILM COATED ORAL NIGHTLY PRN
Qty: 90 TABLET | Refills: 0 | Status: SHIPPED | OUTPATIENT
Start: 2024-03-13

## 2024-03-13 RX ORDER — LIFITEGRAST 50 MG/ML
1 SOLUTION/ DROPS OPHTHALMIC 2 TIMES DAILY
Qty: 15 EACH | Refills: 2 | Status: SHIPPED | OUTPATIENT
Start: 2024-03-13

## 2024-03-13 NOTE — PROGRESS NOTES
"Chief Complaint  Cyst (1 week follow up)    Subjective         Judy Ramey presents to [facility]  Presents to the office today for follow-up regarding a soft tissue mass on her right lower arm.  She states that the nodule is still there.  She denies any tenderness at this time.  I did explain that I was going to order an ultrasound to further evaluate this since it was not improving and it appeared after injuring her arm.  Patient does state that she would like a refill of the amitriptyline as this helped her relax at nighttime.  Does state that she needs all of her medications refilled at [facility].  She states that the medications for some reason Getting sent to [facility].    Cyst         Objective     Vitals:    03/13/24 0724   BP: 114/70   BP Location: Right arm   Patient Position: Sitting   Cuff Size: Adult   Pulse: 70   Temp: 96 °F (35.6 °C)   TempSrc: Temporal   SpO2: 98%   Weight: 97.6 kg (215 lb 3.2 oz)   Height: 167.6 cm (66\")      Body mass index is 34.73 kg/m².             Physical Exam  Vitals reviewed.   Constitutional:       Appearance: Normal appearance.   Cardiovascular:      Rate and Rhythm: Normal rate and regular rhythm.      Pulses: Normal pulses.      Heart sounds: Normal heart sounds, S1 normal and S2 normal. No murmur heard.  Pulmonary:      Effort: Pulmonary effort is normal. No respiratory distress.      Breath sounds: Normal breath sounds.   Musculoskeletal:        Arms:       Comments: Round nodule to right posterior lower arm approximately 1.5 cm round. Nontender, no erythema noted   Skin:     General: Skin is warm and dry.   Neurological:      Mental Status: She is alert and oriented to person, place, and time.   Psychiatric:         Attention and Perception: Attention normal.         Mood and Affect: Mood normal.         Behavior: Behavior normal.          Result Review :   The following data was reviewed by: FERNY Martin on " 03/13/2024:      Procedures    Assessment and Plan   Diagnoses and all orders for this visit:    1. Hypothyroidism, unspecified type (Primary)  -     Synthroid 125 MCG tablet; Take 1 tablet by mouth Daily.  Dispense: 90 tablet; Refill: 1    2. Gastroesophageal reflux disease, unspecified whether esophagitis present  -     famotidine (PEPCID) 20 MG tablet; Take 1 tablet by mouth At Night As Needed for Heartburn.  Dispense: 90 tablet; Refill: 1    3. Allergic rhinitis, unspecified seasonality, unspecified trigger  -     fluticasone (FLONASE) 50 MCG/ACT nasal spray; 2 sprays into the nostril(s) as directed by provider Daily.  Dispense: 50 g; Refill: 4    4. Osteoarthritis, unspecified osteoarthritis type, unspecified site  -     diclofenac (VOLTAREN) 75 MG EC tablet; Take 1 tablet by mouth 2 (Two) Times a Day.  Dispense: 180 tablet; Refill: 0    5. Anxiety  -     escitalopram (LEXAPRO) 10 MG tablet; Take 1 tablet by mouth Daily.  Dispense: 90 tablet; Refill: 1    6. Major depressive disorder, single episode, mild  -     escitalopram (LEXAPRO) 10 MG tablet; Take 1 tablet by mouth Daily.  Dispense: 90 tablet; Refill: 1    7. Stress incontinence  -     tolterodine LA (DETROL LA) 2 MG 24 hr capsule; Take 1 capsule by mouth Daily.  Dispense: 90 capsule; Refill: 1    8. Dry eyes  -     Xiidra 5 % ophthalmic solution; Administer 1 drop to both eyes 2 (Two) Times a Day.  Dispense: 15 each; Refill: 2  -     Lubricating Plus Eye Drops 0.5 % solution; Administer 2 drops to both eyes Daily As Needed for Dry Eyes.  Dispense: 70 each; Refill: 1    9. Chronic right-sided low back pain with right-sided sciatica  -     amitriptyline (ELAVIL) 25 MG tablet; Take 1 tablet by mouth At Night As Needed for Sleep.  Dispense: 90 tablet; Refill: 0    10. Soft tissue mass  -     US Soft Tissue; Future          Follow Up   Return for Next scheduled follow up.  Patient was given instructions and counseling regarding her condition or for health  maintenance advice. Please see specific information pulled into the AVS if appropriate.

## 2024-03-20 ENCOUNTER — TELEPHONE (OUTPATIENT)
Dept: FAMILY MEDICINE CLINIC | Facility: CLINIC | Age: 64
End: 2024-03-20
Payer: OTHER GOVERNMENT

## 2024-03-20 NOTE — TELEPHONE ENCOUNTER
Caller: Judy Ramey    Relationship: Self    Best call back number: [phone]    What form or medical record are you requesting: PAPER AUTHORIZATION LETTER TO TAKE TO PHARMACY XIIDRA 5 PERCENT OPHTHALMIC SOLUTION    Who is requesting this form or medical record from you: PATIENT    How would you like to receive the form or medical records (pick-up, mail, fax):   If fax, what is the fax number: N/A  If mail, what is the address: N/A  If pick-up, provide patient with address and location details    Timeframe paperwork needed: TODAY OR TOMORROW    Additional notes: PATIENT NEEDS A PAPER COPY OF THIS PRESCRIPTION AUTHORIZATION TO TAKE TO PHARMACY. PLEASE CALL PATIENT WHEN THIS IS READY FOR  AT THE . FOR THE [facility] PH[facility]Y THEY WANT PAPER COPY.      [facility] - [place], [place] - [address] [facility] - [phone]  - [phone]  [phone]

## 2024-03-25 ENCOUNTER — TELEPHONE (OUTPATIENT)
Dept: FAMILY MEDICINE CLINIC | Facility: CLINIC | Age: 64
End: 2024-03-25
Payer: OTHER GOVERNMENT

## 2024-03-25 DIAGNOSIS — N95.1 MENOPAUSAL SYMPTOMS: ICD-10-CM

## 2024-03-25 RX ORDER — ESTRADIOL 0.05 MG/D
1 PATCH, EXTENDED RELEASE TRANSDERMAL WEEKLY
Qty: 12 PATCH | Refills: 1 | Status: SHIPPED | OUTPATIENT
Start: 2024-03-25

## 2024-03-25 NOTE — TELEPHONE ENCOUNTER
Caller: Judy Ramey    Relationship: Self    Best call back number: [phone]     What orders are you requesting (i.e. lab or imaging): XRAY OR MRI    In what timeframe would the patient need to come in: ASAP    Where will you receive your lab/imaging services: [facility]    Additional notes: PATIENT STATES THAT SHE WOULD LIKE TO HAVE IMAGING DONE OF HER LEFT ARM. SHE IS STILL HAVING PAIN FROM HER SHOULDER DOWN TO HER WRIST

## 2024-03-25 NOTE — TELEPHONE ENCOUNTER
Caller: Judy Ramey    Relationship: Self    Best call back number: [phone]     Requested Prescriptions:   Requested Prescriptions     Pending Prescriptions Disp Refills    estradiol (MINIVELLE, VIVELLE-DOT) 0.05 MG/24HR patch 12 patch 1     Sig: Place 1 patch on the skin as directed by provider 1 (One) Time Per Week.        Pharmacy where request should be sent: [place] [phone] [place], [place] [phone]      Last office visit with prescribing clinician: 3/13/2024   Last telemedicine visit with prescribing clinician: Visit date not found   Next office visit with prescribing clinician: Visit date not found     Does the patient have less than a 3 day supply:  [x] Yes  [] No    Would you like a call back once the refill request has been completed: [] Yes [x] No    If the office needs to give you a call back, can they leave a voicemail: [] Yes [x] No    Tatyana Rosado Rep   03/25/24 07:54 EDT

## 2024-03-26 NOTE — TELEPHONE ENCOUNTER
Called and spoke with Judy, advised Milton needed to see her before he can order imaging. Appointment scheduled and accepted by the patient.

## 2024-03-26 NOTE — TELEPHONE ENCOUNTER
PA DENIED     PER INSURANCE   Coverage is provided in situations where this medication is prescribed by an ophthalmologist or  optometrist. Coverage cannot be authorized at this time.

## 2024-04-03 ENCOUNTER — TELEPHONE (OUTPATIENT)
Dept: PULMONOLOGY | Facility: CLINIC | Age: 64
End: 2024-04-03
Payer: OTHER GOVERNMENT

## 2024-04-03 NOTE — TELEPHONE ENCOUNTER
Patient requested that her script for Dupixent be sent to [facility]. They are now able to fill it at this pharmacy. She would like to be called when it is sent. Please advise, thank you.

## 2024-04-04 DIAGNOSIS — J45.50 SEVERE PERSISTENT ASTHMA WITHOUT COMPLICATION: ICD-10-CM

## 2024-04-04 RX ORDER — DUPILUMAB 200 MG/1.14ML
200 INJECTION, SOLUTION SUBCUTANEOUS
Qty: 2.28 ML | Refills: 11 | Status: SHIPPED | OUTPATIENT
Start: 2024-04-04

## 2024-04-12 ENCOUNTER — TELEPHONE (OUTPATIENT)
Dept: FAMILY MEDICINE CLINIC | Facility: CLINIC | Age: 64
End: 2024-04-12
Payer: OTHER GOVERNMENT

## 2024-04-12 NOTE — TELEPHONE ENCOUNTER
Caller: Judy Ramey    Relationship: Self    Best call back number: [phone]     What medication are you requesting: TREMADOL    What are your current symptoms: BACK PAIN AND KNEE PAIN    How long have you been experiencing symptoms: 3-4 MONTHS    Have you had these symptoms before:    [x] Yes  [] No    Have you been treated for these symptoms before:   [x] Yes  [] No    If a prescription is needed, what is your preferred pharmacy and phone number:   [facility] - [address]      Additional notes:PATIENT HAS BEEN EXPERIENCING BACK AND KNEE PAIN FOR A FEW MONTHS AND THAT SHE WOULD LIKE TO HAVE THIS MEDICATION SENT IN

## 2024-04-12 NOTE — TELEPHONE ENCOUNTER
Called Judy, advised per Milton,   Will need to come in to sign a consent and obtain a urine drug screen for the medication before I can prescribe this to her   Judy verbalized understanding and will be here Monday.

## 2024-04-15 ENCOUNTER — CLINICAL SUPPORT (OUTPATIENT)
Dept: FAMILY MEDICINE CLINIC | Facility: CLINIC | Age: 64
End: 2024-04-15
Payer: OTHER GOVERNMENT

## 2024-04-15 DIAGNOSIS — M54.50 ACUTE LOW BACK PAIN WITHOUT SCIATICA, UNSPECIFIED BACK PAIN LATERALITY: ICD-10-CM

## 2024-04-15 DIAGNOSIS — Z51.81 MEDICATION MONITORING ENCOUNTER: Primary | ICD-10-CM

## 2024-04-15 LAB
AMPHET+METHAMPHET UR QL: NEGATIVE
AMPHETAMINE INTERNAL CONTROL: NORMAL
AMPHETAMINES UR QL: NEGATIVE
BARBITURATE INTERNAL CONTROL: NORMAL
BARBITURATES UR QL SCN: NEGATIVE
BENZODIAZ UR QL SCN: NEGATIVE
BENZODIAZEPINE INTERNAL CONTROL: NORMAL
BUPRENORPHINE INTERNAL CONTROL: NORMAL
BUPRENORPHINE SERPL-MCNC: NEGATIVE NG/ML
CANNABINOIDS SERPL QL: NEGATIVE
COCAINE INTERNAL CONTROL: NORMAL
COCAINE UR QL: NEGATIVE
EXPIRATION DATE: NORMAL
Lab: NORMAL
MDMA (ECSTASY) INTERNAL CONTROL: NORMAL
MDMA UR QL SCN: NEGATIVE
METHADONE INTERNAL CONTROL: NORMAL
METHADONE UR QL SCN: NEGATIVE
METHAMPHETAMINE INTERNAL CONTROL: NORMAL
MORPHINE INTERNAL CONTROL: NORMAL
MORPHINE/OPIATES SCREEN, URINE: NEGATIVE
OXYCODONE INTERNAL CONTROL: NORMAL
OXYCODONE UR QL SCN: NEGATIVE
PCP UR QL SCN: NEGATIVE
PHENCYCLIDINE INTERNAL CONTROL: NORMAL
THC INTERNAL CONTROL: NORMAL

## 2024-04-15 PROCEDURE — 80305 DRUG TEST PRSMV DIR OPT OBS: CPT | Performed by: NURSE PRACTITIONER

## 2024-04-15 RX ORDER — TRAMADOL HYDROCHLORIDE 50 MG/1
50 TABLET ORAL EVERY 6 HOURS PRN
Qty: 30 TABLET | Refills: 0 | Status: SHIPPED | OUTPATIENT
Start: 2024-04-15

## 2024-04-16 ENCOUNTER — TELEPHONE (OUTPATIENT)
Dept: FAMILY MEDICINE CLINIC | Facility: CLINIC | Age: 64
End: 2024-04-16
Payer: OTHER GOVERNMENT

## 2024-04-16 DIAGNOSIS — N95.1 MENOPAUSAL SYMPTOMS: ICD-10-CM

## 2024-04-16 NOTE — TELEPHONE ENCOUNTER
MELE AT [facility] wanting a call back to go over estradiol patches. They were called in for the twice weekly patches and the sig was written once weekly. Wanted to verify if right patches were called in.

## 2024-04-17 RX ORDER — ESTRADIOL 0.05 MG/D
1 PATCH, EXTENDED RELEASE TRANSDERMAL WEEKLY
Qty: 12 PATCH | Refills: 1 | Status: SHIPPED | OUTPATIENT
Start: 2024-04-17 | End: 2024-04-19 | Stop reason: SDUPTHER

## 2024-04-18 NOTE — TELEPHONE ENCOUNTER
Caller: Judy Ramey    Relationship to patient: Self    Best call back number: [phone]     Patient is needing: PATIENT STATES SHE IS REALLY LOW ON MEDICATION, PATIENT STATES PRESCRIPTION STILL ISN'T CORRECT. PATIENT STATES THAT SHE IS SUPPOSED TO TAKE MEDICATION TWICE A WEEK. PATIENT WANTING A CALL BACK WHEN PRESCRIPTION IS CORRECTED.

## 2024-04-19 DIAGNOSIS — N95.1 MENOPAUSAL SYMPTOMS: ICD-10-CM

## 2024-04-19 RX ORDER — ESTRADIOL 0.05 MG/D
1 PATCH, EXTENDED RELEASE TRANSDERMAL 2 TIMES WEEKLY
Qty: 24 PATCH | Refills: 1 | Status: SHIPPED | OUTPATIENT
Start: 2024-04-22

## 2024-04-19 NOTE — TELEPHONE ENCOUNTER
Per [facility] pharmacy, patches are made to be twice a week otherwise patient does not get the estrogen half the week. Gave verbal per Milton to fill as twice a week. Updated RX

## 2024-04-19 NOTE — TELEPHONE ENCOUNTER
Called and spoke with Judy, she states that her GYN was wanting the higher dose patches but since Perla Tejada is not able to get it, she was instructed to use the patch twice weekly,. She has been doing twice weekly since her last visit with GYN. Can we change the patches to twice weekly?

## 2024-05-02 ENCOUNTER — OFFICE VISIT (OUTPATIENT)
Dept: PULMONOLOGY | Facility: CLINIC | Age: 64
End: 2024-05-02
Payer: OTHER GOVERNMENT

## 2024-05-02 VITALS
TEMPERATURE: 97.6 F | HEIGHT: 66 IN | HEART RATE: 59 BPM | DIASTOLIC BLOOD PRESSURE: 63 MMHG | BODY MASS INDEX: 33.46 KG/M2 | OXYGEN SATURATION: 98 % | RESPIRATION RATE: 16 BRPM | SYSTOLIC BLOOD PRESSURE: 148 MMHG | WEIGHT: 208.2 LBS

## 2024-05-02 DIAGNOSIS — J34.89 SINUS PRESSURE: ICD-10-CM

## 2024-05-02 DIAGNOSIS — J32.0 CHRONIC MAXILLARY SINUSITIS: ICD-10-CM

## 2024-05-02 DIAGNOSIS — J30.9 ALLERGIC RHINITIS, UNSPECIFIED SEASONALITY, UNSPECIFIED TRIGGER: ICD-10-CM

## 2024-05-02 DIAGNOSIS — J30.2 SEASONAL ALLERGIES: ICD-10-CM

## 2024-05-02 DIAGNOSIS — J34.89 SINUS DRAINAGE: ICD-10-CM

## 2024-05-02 DIAGNOSIS — J45.50 SEVERE PERSISTENT ASTHMA WITHOUT COMPLICATION: Primary | Chronic | ICD-10-CM

## 2024-05-02 DIAGNOSIS — R09.81 NASAL CONGESTION: ICD-10-CM

## 2024-05-02 LAB
EXPIRATION DATE: NORMAL
FLUAV AG UPPER RESP QL IA.RAPID: NOT DETECTED
FLUBV AG UPPER RESP QL IA.RAPID: NOT DETECTED
INTERNAL CONTROL: NORMAL
Lab: 9737
SARS-COV-2 AG UPPER RESP QL IA.RAPID: NOT DETECTED

## 2024-05-02 RX ORDER — DUPILUMAB 200 MG/1.14ML
200 INJECTION, SOLUTION SUBCUTANEOUS
Qty: 6.84 ML | Refills: 3 | Status: SHIPPED | OUTPATIENT
Start: 2024-05-02 | End: 2024-05-06

## 2024-05-02 RX ORDER — CALCIUM CARBONATE/VITAMIN D3 600 MG-10
TABLET ORAL
COMMUNITY
Start: 2024-03-06

## 2024-05-02 RX ORDER — AZITHROMYCIN 250 MG/1
TABLET, FILM COATED ORAL
Qty: 6 TABLET | Refills: 0 | Status: SHIPPED | OUTPATIENT
Start: 2024-05-02

## 2024-05-02 NOTE — PROGRESS NOTES
Primary Care Provider  Milton Zamarripa APRN     Referring Provider  No ref. provider found     Chief Complaint  Asthma, Cough (Yellow thick, nasal drainage x 1 day ), and Follow-up (4 month f/up )    Subjective          Judy Ramey presents to [facility]  History of Present Illness  Judy Ramey is a 63 y.o. female patient of Dr. Vee here for management of very persistent asthma, seasonal allergies, chronic maxillary sinusitis and tobacco use cigarettes in remission.    Dates she is doing well since her last office visit.  She denies using any antibiotics or steroids for her lungs.  She states that over the last 24 hours she has had a change in mucus color from clear to yellow and has felt feverish.  She also notices a significant amount of drainage.  She is agreeable to being tested for COVID/flu, as she has an important event tonight.  Patient continues to use Advair.  She admits to not using it as often as she should as she recently moved, but she states that she will restart this medication today.  She continues to take Singulair as prescribed.  She is well-controlled on Dupixent and is benefiting from its use.  She rarely needs to use her albuterol inhaler.  She is also using Flonase and azelastine nasal spray for sinusitis.  She was referred to Dr. De Luna at last visit for enlarged turbinates.  She states that she was never called with an appointment.  After reviewing the referral note, it appears the patient was previously established with Dr. Roper and could not be referred to Dr. De Luna.  Given that Dr. De Luna has now joined with [facility] [place], I will send a new referral.  Otherwise, she states that she is doing okay and has no additional concerns at this time.  She is able to perform her ADLs without difficulty.  She is up-to-date with her COVID, flu and RSV vaccines.  She does decline a pneumonia vaccine.     Her history of smoking is   Tobacco Use:  Medium Risk (2024)    Patient History     Smoking Tobacco Use: Former     Smokeless Tobacco Use: Never     Passive Exposure: Past   .    Review of Systems   Constitutional:  Negative for chills, fatigue, fever, unexpected weight gain and unexpected weight loss.   HENT:  Positive for congestion (Nasal), postnasal drip and sinus pressure.    Respiratory:  Positive for cough. Negative for apnea, shortness of breath and wheezing.         Negative for Hemoptysis     Cardiovascular:  Negative for chest pain, palpitations and leg swelling.   Skin:         Negative for cyanosis      Sleep: Negative for Excessive daytime sleepiness  Negative for morning headaches  Negative for Snoring    Family History   Problem Relation Age of Onset    Arthritis Father     Stroke Father     Heart disease Father     Diabetes Father     Arthritis Mother     Heart disease Mother     Diabetes Mother     Dementia Mother     Cancer Sister     Breast cancer Half-Sister     Malig Hyperthermia Neg Hx     Ovarian cancer Neg Hx     Uterine cancer Neg Hx     Colon cancer Neg Hx     Melanoma Neg Hx     Prostate cancer Neg Hx         Social History     Socioeconomic History    Marital status:    Tobacco Use    Smoking status: Former     Current packs/day: 0.00     Average packs/day: 1 pack/day for 5.0 years (5.0 ttl pk-yrs)     Types: Cigarettes     Start date:      Quit date:      Years since quittin.3     Passive exposure: Past    Smokeless tobacco: Never   Vaping Use    Vaping status: Never Used   Substance and Sexual Activity    Alcohol use: Not Currently    Drug use: Never    Sexual activity: Defer     Birth control/protection: Hysterectomy        Past Medical History:   Diagnosis Date    Abnormal Pap smear of cervix     Allergic     Anxiety     Asthma     Cataracts, bilateral     Left eye worse per patient    Change in voice     Cough     Dry mouth     GERD (gastroesophageal reflux disease) 2019    History of tobacco  use     Hoarseness of voice     Hypothyroidism 08/28/2019    Laryngeal candidiasis     Low back pain     Osteoarthritis         Immunization History   Administered Date(s) Administered    ABRYSVO (RSV, 60+ or pregnant women 32-36 wks) 01/15/2024    COVID-19 (PFIZER) BIVALENT 12+YRS 09/15/2022    COVID-19 (PFIZER) Purple Cap Monovalent 03/26/2021, 04/16/2021, 11/10/2021    Flu Vaccine Quad PF >36MO 10/28/2019, 10/23/2021    Fluzone (or Fluarix & Flulaval for VFC) >6mos 10/23/2021, 10/24/2022, 10/25/2023    Fluzone Quad >6mos (Multi-dose) 09/30/2020    Influenza Injectable Mdck Pf Quad 10/24/2022    Influenza, Unspecified 09/30/2020    Shingrix 11/01/2021, 02/16/2022         No Known Allergies       Current Outpatient Medications:     albuterol sulfate HFA (ProAir HFA) 108 (90 Base) MCG/ACT inhaler, Inhale 2 puffs Every 4 (Four) Hours As Needed for Wheezing., Disp: 54 g, Rfl: 4    Alcohol Swabs (Easy Touch Alcohol Prep Medium) 70 % pads, Apply 1 each topically to the appropriate area as directed Daily As Needed (as needed)., Disp: 100 each, Rfl: 2    amitriptyline (ELAVIL) 25 MG tablet, Take 1 tablet by mouth At Night As Needed for Sleep., Disp: 90 tablet, Rfl: 0    AYR Saline Nasal Rinse 1.57 g pack, , Disp: , Rfl:     azelastine (ASTELIN) 0.1 % nasal spray, 2 sprays into the nostril(s) as directed by provider 2 (Two) Times a Day. Use in each nostril as directed, Disp: 90 mL, Rfl: 4    calcium carb-cholecalciferol 600-10 MG-MCG tablet per tablet, , Disp: , Rfl:     Calcium Carbonate-Vit D-Min (SM Calcium/Vitamin D3) 600-800 MG-UNIT tablet, Take 1 tablet by mouth Daily., Disp: 90 tablet, Rfl: 3    desonide (DesOwen) 0.05 % cream, Apply 1 application topically to the appropriate area as directed 2 (Two) Times a Day., Disp: 15 g, Rfl: 0    diclofenac (VOLTAREN) 75 MG EC tablet, Take 1 tablet by mouth 2 (Two) Times a Day., Disp: 180 tablet, Rfl: 0    Diclofenac Sodium (VOLTAREN) 1 % gel gel, Apply 4 g topically to the  appropriate area as directed 3 (Three) Times a Day., Disp: 100 g, Rfl: 2    Dupilumab (Dupixent) 200 MG/1.14ML solution prefilled syringe, Inject 200 mg under the skin into the appropriate area as directed Every 14 (Fourteen) Days., Disp: 6.84 mL, Rfl: 3    escitalopram (LEXAPRO) 10 MG tablet, Take 1 tablet by mouth Daily., Disp: 90 tablet, Rfl: 1    estradiol (MINIVELLE, VIVELLE-DOT) 0.05 MG/24HR patch, Place 1 patch on the skin as directed by provider 2 (Two) Times a Week., Disp: 24 patch, Rfl: 1    famotidine (PEPCID) 20 MG tablet, Take 1 tablet by mouth At Night As Needed for Heartburn., Disp: 90 tablet, Rfl: 1    fluticasone (FLONASE) 50 MCG/ACT nasal spray, 2 sprays into the nostril(s) as directed by provider Daily., Disp: 50 g, Rfl: 4    fluticasone-salmeterol (Advair HFA) 230-21 MCG/ACT inhaler, Inhale 2 puffs 2 (Two) Times a Day., Disp: 36 g, Rfl: 3    fluticasone-salmeterol (Advair HFA) 230-21 MCG/ACT inhaler, Inhale 2 puffs 2 (Two) Times a Day., Disp: 36 g, Rfl: 4    hydrOXYzine (ATARAX) 25 MG tablet, Take 1 tablet by mouth 3 (Three) Times a Day As Needed for Anxiety., Disp: 90 tablet, Rfl: 0    Hypertonic Nasal Wash (NasaFlo Neti Pot Nasal Wash) pack, 1 ampule into the nostril(s) as directed by provider Daily., Disp: 50 each, Rfl: 11    ipratropium-albuterol (DUO-NEB) 0.5-2.5 mg/3 ml nebulizer, Take 3 mL by nebulization 4 (Four) Times a Day. (Patient taking differently: Take 3 mL by nebulization As Needed.), Disp: 360 mL, Rfl: 11    lidocaine (LIDODERM) 5 %, Place 1 patch on the skin as directed by provider Daily. Remove & Discard patch within 12 hours or as directed by MD, Disp: 90 patch, Rfl: 3    loratadine (CLARITIN) 10 MG tablet, Take 1 tablet by mouth Daily., Disp: 90 tablet, Rfl: 11    Lubricating Plus Eye Drops 0.5 % solution, Administer 2 drops to both eyes Daily As Needed for Dry Eyes., Disp: 70 each, Rfl: 1    montelukast (SINGULAIR) 10 MG tablet, Take 1 tablet by mouth Every Night., Disp: 90  tablet, Rfl: 11    multivitamin with minerals tablet tablet, Take 1 tablet by mouth Daily., Disp: , Rfl:     pantoprazole (Protonix) 40 MG EC tablet, Take 1 tablet by mouth Daily., Disp: 90 tablet, Rfl: 4    PreviDent 5000 Plus 1.1 % cream, , Disp: , Rfl:     Saline (NasoGel) gel, 1 spray into the nostril(s) as directed by provider 2 (Two) Times a Day., Disp: 28.4 g, Rfl: 11    Synthroid 125 MCG tablet, Take 1 tablet by mouth Daily., Disp: 90 tablet, Rfl: 1    tolterodine LA (DETROL LA) 2 MG 24 hr capsule, Take 1 capsule by mouth Daily., Disp: 90 capsule, Rfl: 1    traMADol (ULTRAM) 50 MG tablet, Take 1 tablet by mouth Every 6 (Six) Hours As Needed for Moderate Pain., Disp: 30 tablet, Rfl: 0    vitamin C (ASCORBIC ACID) 500 MG tablet, Take 1 tablet by mouth Daily., Disp: 90 tablet, Rfl: 11    Xiidra 5 % ophthalmic solution, Administer 1 drop to both eyes 2 (Two) Times a Day., Disp: 15 each, Rfl: 2    azithromycin (ZITHROMAX) 250 MG tablet, Take 2 by mouth today then 1 daily for 4 days, Disp: 6 tablet, Rfl: 0     Objective   Physical Exam  Constitutional:       General: She is not in acute distress.     Appearance: Normal appearance. She is normal weight.   HENT:      Right Ear: Hearing normal.      Left Ear: Hearing normal.      Nose: No nasal tenderness or congestion.      Right Turbinates: Enlarged.      Left Turbinates: Enlarged.      Mouth/Throat:      Mouth: Mucous membranes are moist. No oral lesions.      Pharynx: Posterior oropharyngeal erythema present.   Eyes:      Extraocular Movements: Extraocular movements intact.      Pupils: Pupils are equal, round, and reactive to light.   Cardiovascular:      Rate and Rhythm: Normal rate and regular rhythm.      Pulses: Normal pulses.      Heart sounds: Normal heart sounds. No murmur heard.  Pulmonary:      Effort: Pulmonary effort is normal.      Breath sounds: Normal breath sounds. No wheezing, rhonchi or rales.   Musculoskeletal:      Right lower leg: No edema.  "     Left lower leg: No edema.   Skin:     General: Skin is warm and dry.      Findings: No lesion or rash.   Neurological:      General: No focal deficit present.      Mental Status: She is alert and oriented to person, place, and time.   Psychiatric:         Mood and Affect: Affect normal. Mood is not anxious or depressed.         Vital Signs:   /63 (BP Location: Right arm, Patient Position: Sitting, Cuff Size: Large Adult)   Pulse 59   Temp 97.6 °F (36.4 °C) (Oral)   Resp 16   Ht 167.6 cm (66\")   Wt 94.4 kg (208 lb 3.2 oz)   SpO2 98% Comment: RA  BMI 33.60 kg/m²        Result Review :   The following data was reviewed by: FERNY York on 05/02/2024:  CMP          10/25/2023    10:49   CMP   Glucose 104    BUN 11    Creatinine 0.80    EGFR 82.9    Sodium 143    Potassium 4.5    Chloride 107    Calcium 9.5    Total Protein 6.7    Albumin 4.4    Globulin 2.3    Total Bilirubin 0.3    Alkaline Phosphatase 99    AST (SGOT) 16    ALT (SGPT) 22    Albumin/Globulin Ratio 1.9    BUN/Creatinine Ratio 13.8    Anion Gap 7.0      CBC w/diff          10/25/2023    10:49   CBC w/Diff   WBC 3.46    RBC 4.40    Hemoglobin 12.6    Hematocrit 37.0    MCV 84.1    MCH 28.6    MCHC 34.1    RDW 12.3    Platelets 191      Data reviewed : Radiologic studies chest CT 6/22/2022 and Dr. Vee's last office note    Procedures        Assessment and Plan    Diagnoses and all orders for this visit:    1. Severe persistent asthma without complication (Primary)  Comments:  Continue Advair and Dupixent  Orders:  -     Dupilumab (Dupixent) 200 MG/1.14ML solution prefilled syringe; Inject 200 mg under the skin into the appropriate area as directed Every 14 (Fourteen) Days.  Dispense: 6.84 mL; Refill: 3    2. Seasonal allergies  Comments:  Continue Singulair, Flonase and azelastine nasal spray    3. Chronic maxillary sinusitis  Comments:  Referral placed to ENT  Orders:  -     Ambulatory Referral to ENT (Otolaryngology)    4. " Allergic rhinitis, unspecified seasonality, unspecified trigger  Comments:  Continue Flonase and azelastine nasal spray    5. Nasal congestion  Comments:  Azithromycin sent  Orders:  -     POCT SARS-CoV-2 Antigen VIPIN + Flu  -     azithromycin (ZITHROMAX) 250 MG tablet; Take 2 by mouth today then 1 daily for 4 days  Dispense: 6 tablet; Refill: 0    6. Sinus drainage  Comments:  Azithromycin sent  Orders:  -     POCT SARS-CoV-2 Antigen VIPIN + Flu  -     azithromycin (ZITHROMAX) 250 MG tablet; Take 2 by mouth today then 1 daily for 4 days  Dispense: 6 tablet; Refill: 0    7. Sinus pressure  Comments:  Azithromycin sent  Orders:  -     POCT SARS-CoV-2 Antigen VIPIN + Flu  -     azithromycin (ZITHROMAX) 250 MG tablet; Take 2 by mouth today then 1 daily for 4 days  Dispense: 6 tablet; Refill: 0        Follow-up in 4 months, sooner if needed      Follow Up   Return in about 4 months (around 9/2/2024) for Recheck with Kenny.  Patient was given instructions and counseling regarding her condition or for health maintenance advice. Please see specific information pulled into the AVS if appropriate.

## 2024-05-03 ENCOUNTER — TELEPHONE (OUTPATIENT)
Dept: PULMONOLOGY | Facility: CLINIC | Age: 64
End: 2024-05-03

## 2024-05-03 NOTE — TELEPHONE ENCOUNTER
Hub staff attempted to follow warm transfer process and was unsuccessful     Caller: Judy Ramey    Relationship to patient: Self    Best call back number: [phone]     Patient is needing: PT REFILL WAS DENIED BY PHARMACY BECAUSE THEY ONLY HAVE 300 MG DUPIXENT THEY WOULD NEED A NEW SCRIPT IF OK TO INCREASE, IF NOT PLEASE CALL PT BACK TO LET THEM KNOW.

## 2024-05-06 ENCOUNTER — TELEPHONE (OUTPATIENT)
Dept: PULMONOLOGY | Facility: CLINIC | Age: 64
End: 2024-05-06

## 2024-05-06 DIAGNOSIS — J45.50 SEVERE PERSISTENT ASTHMA WITHOUT COMPLICATION: Primary | Chronic | ICD-10-CM

## 2024-05-06 DIAGNOSIS — J45.50 SEVERE PERSISTENT ASTHMA WITHOUT COMPLICATION: Chronic | ICD-10-CM

## 2024-05-06 RX ORDER — DUPILUMAB 200 MG/1.14ML
200 INJECTION, SOLUTION SUBCUTANEOUS
Qty: 6.84 ML | Refills: 3 | Status: SHIPPED | OUTPATIENT
Start: 2024-05-06

## 2024-05-06 RX ORDER — DUPILUMAB 200 MG/1.14ML
200 INJECTION, SOLUTION SUBCUTANEOUS
Qty: 6.84 ML | Refills: 3 | Status: CANCELLED | OUTPATIENT
Start: 2024-05-06

## 2024-05-06 NOTE — TELEPHONE ENCOUNTER
Caller: Judy Ramey      Relationship: self     Best call back number: [phone]      Who are you requesting to speak with (clinical staff, provider,  specific staff member): tristian     What was the call regarding: returning call to tristian, I did not see any notes.

## 2024-05-06 NOTE — TELEPHONE ENCOUNTER
Spoke to patient she is needing to speak to Gabby Rothman RN, patient has questions about her Dupixent.

## 2024-05-16 ENCOUNTER — HOSPITAL ENCOUNTER (OUTPATIENT)
Dept: GENERAL RADIOLOGY | Facility: HOSPITAL | Age: 64
Discharge: HOME | End: 2024-05-16
Payer: OTHER GOVERNMENT

## 2024-05-16 ENCOUNTER — OFFICE VISIT (OUTPATIENT)
Dept: FAMILY MEDICINE CLINIC | Facility: CLINIC | Age: 64
End: 2024-05-16
Payer: OTHER GOVERNMENT

## 2024-05-16 VITALS
RESPIRATION RATE: 16 BRPM | HEART RATE: 77 BPM | BODY MASS INDEX: 33.65 KG/M2 | HEIGHT: 66 IN | TEMPERATURE: 96.2 F | WEIGHT: 209.4 LBS | DIASTOLIC BLOOD PRESSURE: 62 MMHG | OXYGEN SATURATION: 96 % | SYSTOLIC BLOOD PRESSURE: 110 MMHG

## 2024-05-16 DIAGNOSIS — K21.9 GASTROESOPHAGEAL REFLUX DISEASE WITHOUT ESOPHAGITIS: Primary | ICD-10-CM

## 2024-05-16 DIAGNOSIS — M54.2 NECK PAIN: ICD-10-CM

## 2024-05-16 DIAGNOSIS — K21.9 GASTROESOPHAGEAL REFLUX DISEASE, UNSPECIFIED WHETHER ESOPHAGITIS PRESENT: ICD-10-CM

## 2024-05-16 DIAGNOSIS — M25.522 LEFT ELBOW PAIN: ICD-10-CM

## 2024-05-16 PROCEDURE — 73080 X-RAY EXAM OF ELBOW: CPT

## 2024-05-16 PROCEDURE — 99213 OFFICE O/P EST LOW 20 MIN: CPT | Performed by: NURSE PRACTITIONER

## 2024-05-16 PROCEDURE — 72050 X-RAY EXAM NECK SPINE 4/5VWS: CPT

## 2024-05-16 RX ORDER — CYCLOBENZAPRINE HCL 10 MG
10 TABLET ORAL 3 TIMES DAILY PRN
Qty: 90 TABLET | Refills: 1 | Status: SHIPPED | OUTPATIENT
Start: 2024-05-16

## 2024-05-16 RX ORDER — FAMOTIDINE 40 MG/1
40 TABLET, FILM COATED ORAL 2 TIMES DAILY PRN
Qty: 180 TABLET | Refills: 1 | Status: SHIPPED | OUTPATIENT
Start: 2024-05-16

## 2024-05-16 RX ORDER — ESOMEPRAZOLE MAGNESIUM 40 MG/1
40 CAPSULE, DELAYED RELEASE ORAL
Qty: 90 CAPSULE | Refills: 1 | Status: SHIPPED | OUTPATIENT
Start: 2024-05-16

## 2024-05-16 NOTE — PROGRESS NOTES
Chief Complaint  Neck Pain, Shoulder Pain (Left), and Heartburn    Subjective        Judy Ramey presents to [facility]  History of Present Illness  Neck and shoulder pain that started and radiates to arm.  Has been for the last few months.  Started at neck.    Acid reflux and was up at night.  Neck Pain   Pertinent negatives include no chest pain, fever, numbness or weakness.   Heartburn  She reports no chest pain or no coughing.       The following portions of the patient's history were personally reviewed and updated as appropriate: allergies, current medications, past medical history, past surgical history, past family history, and past social history.     Body mass index is 33.81 kg/m².           Past History:    Medical History: has a past medical history of Abnormal Pap smear of cervix, Allergic, Anxiety, Asthma, Cataracts, bilateral, Change in voice, Cough, Dry mouth, GERD (gastroesophageal reflux disease) (08/28/2019), History of tobacco use, Hoarseness of voice, Hypothyroidism (08/28/2019), Laryngeal candidiasis, Low back pain, and Osteoarthritis.     Surgical History: has a past surgical history that includes Colonoscopy; Other surgical history; Upper gastrointestinal endoscopy; Knee arthroscopy w/ meniscal repair (Right); Esophagogastroduodenoscopy (N/A, 01/11/2022); and Hysterectomy.     Family History: family history includes Arthritis in her father and mother; Breast cancer in her half-sister; Cancer in her sister; Dementia in her mother; Diabetes in her father and mother; Heart disease in her father and mother; Stroke in her father.     Social History: reports that she quit smoking about 25 years ago. Her smoking use included cigarettes. She started smoking about 30 years ago. She has a 5 pack-year smoking history. She has been exposed to tobacco smoke. She has never used smokeless tobacco. She reports that she does not currently use alcohol. She reports that  she does not use drugs.    Allergies: Patient has no known allergies.          Current Outpatient Medications:     albuterol sulfate HFA (ProAir HFA) 108 (90 Base) MCG/ACT inhaler, Inhale 2 puffs Every 4 (Four) Hours As Needed for Wheezing., Disp: 54 g, Rfl: 4    Alcohol Swabs (Easy Touch Alcohol Prep Medium) 70 % pads, Apply 1 each topically to the appropriate area as directed Daily As Needed (as needed)., Disp: 100 each, Rfl: 2    amitriptyline (ELAVIL) 25 MG tablet, Take 1 tablet by mouth At Night As Needed for Sleep., Disp: 90 tablet, Rfl: 0    AYR Saline Nasal Rinse 1.57 g pack, , Disp: , Rfl:     azelastine (ASTELIN) 0.1 % nasal spray, 2 sprays into the nostril(s) as directed by provider 2 (Two) Times a Day. Use in each nostril as directed, Disp: 90 mL, Rfl: 4    calcium carb-cholecalciferol 600-10 MG-MCG tablet per tablet, , Disp: , Rfl:     Calcium Carbonate-Vit D-Min (SM Calcium/Vitamin D3) 600-800 MG-UNIT tablet, Take 1 tablet by mouth Daily., Disp: 90 tablet, Rfl: 3    desonide (DesOwen) 0.05 % cream, Apply 1 application topically to the appropriate area as directed 2 (Two) Times a Day., Disp: 15 g, Rfl: 0    diclofenac (VOLTAREN) 75 MG EC tablet, Take 1 tablet by mouth 2 (Two) Times a Day. (Patient taking differently: Take 1 tablet by mouth 2 (Two) Times a Day As Needed.), Disp: 180 tablet, Rfl: 0    Diclofenac Sodium (VOLTAREN) 1 % gel gel, Apply 4 g topically to the appropriate area as directed 3 (Three) Times a Day., Disp: 100 g, Rfl: 2    Dupilumab (Dupixent) 200 MG/1.14ML solution prefilled syringe, Inject 200 mg under the skin into the appropriate area as directed Every 14 (Fourteen) Days., Disp: 6.84 mL, Rfl: 3    escitalopram (LEXAPRO) 10 MG tablet, Take 1 tablet by mouth Daily., Disp: 90 tablet, Rfl: 1    estradiol (MINIVELLE, VIVELLE-DOT) 0.05 MG/24HR patch, Place 1 patch on the skin as directed by provider 2 (Two) Times a Week., Disp: 24 patch, Rfl: 1    famotidine (PEPCID) 40 MG tablet, Take  1 tablet by mouth 2 (Two) Times a Day As Needed for Heartburn., Disp: 180 tablet, Rfl: 1    fluticasone (FLONASE) 50 MCG/ACT nasal spray, 2 sprays into the nostril(s) as directed by provider Daily., Disp: 50 g, Rfl: 4    fluticasone-salmeterol (Advair HFA) 230-21 MCG/ACT inhaler, Inhale 2 puffs 2 (Two) Times a Day., Disp: 36 g, Rfl: 3    fluticasone-salmeterol (Advair HFA) 230-21 MCG/ACT inhaler, Inhale 2 puffs 2 (Two) Times a Day., Disp: 36 g, Rfl: 4    hydrOXYzine (ATARAX) 25 MG tablet, Take 1 tablet by mouth 3 (Three) Times a Day As Needed for Anxiety., Disp: 90 tablet, Rfl: 0    Hypertonic Nasal Wash (NasaFlo Neti Pot Nasal Wash) pack, 1 ampule into the nostril(s) as directed by provider Daily., Disp: 50 each, Rfl: 11    ipratropium-albuterol (DUO-NEB) 0.5-2.5 mg/3 ml nebulizer, Take 3 mL by nebulization 4 (Four) Times a Day. (Patient taking differently: Take 3 mL by nebulization As Needed.), Disp: 360 mL, Rfl: 11    lidocaine (LIDODERM) 5 %, Place 1 patch on the skin as directed by provider Daily. Remove & Discard patch within 12 hours or as directed by MD, Disp: 90 patch, Rfl: 3    loratadine (CLARITIN) 10 MG tablet, Take 1 tablet by mouth Daily., Disp: 90 tablet, Rfl: 11    Lubricating Plus Eye Drops 0.5 % solution, Administer 2 drops to both eyes Daily As Needed for Dry Eyes., Disp: 70 each, Rfl: 1    montelukast (SINGULAIR) 10 MG tablet, Take 1 tablet by mouth Every Night., Disp: 90 tablet, Rfl: 11    multivitamin with minerals tablet tablet, Take 1 tablet by mouth Daily., Disp: , Rfl:     PreviDent 5000 Plus 1.1 % cream, , Disp: , Rfl:     Saline (NasoGel) gel, 1 spray into the nostril(s) as directed by provider 2 (Two) Times a Day., Disp: 28.4 g, Rfl: 11    Synthroid 125 MCG tablet, Take 1 tablet by mouth Daily., Disp: 90 tablet, Rfl: 1    tolterodine LA (DETROL LA) 2 MG 24 hr capsule, Take 1 capsule by mouth Daily., Disp: 90 capsule, Rfl: 1    traMADol (ULTRAM) 50 MG tablet, Take 1 tablet by mouth Every  "6 (Six) Hours As Needed for Moderate Pain., Disp: 30 tablet, Rfl: 0    vitamin C (ASCORBIC ACID) 500 MG tablet, Take 1 tablet by mouth Daily., Disp: 90 tablet, Rfl: 11    Xiidra 5 % ophthalmic solution, Administer 1 drop to both eyes 2 (Two) Times a Day., Disp: 15 each, Rfl: 2    cyclobenzaprine (FLEXERIL) 10 MG tablet, Take 1 tablet by mouth 3 (Three) Times a Day As Needed for Muscle Spasms., Disp: 90 tablet, Rfl: 1    esomeprazole (nexIUM) 40 MG capsule, Take 1 capsule by mouth Every Morning Before Breakfast., Disp: 90 capsule, Rfl: 1    Medications Discontinued During This Encounter   Medication Reason    pantoprazole (Protonix) 40 MG EC tablet *Therapy completed    famotidine (PEPCID) 20 MG tablet Reorder    azithromycin (ZITHROMAX) 250 MG tablet *Therapy completed         Review of Systems   Constitutional:  Negative for fever.   Respiratory:  Negative for cough and shortness of breath.    Cardiovascular:  Negative for chest pain, palpitations and leg swelling.   Musculoskeletal:  Positive for neck pain.   Neurological:  Negative for dizziness, weakness, numbness and headache.        Objective         Vitals:    05/16/24 1120   BP: 110/62   BP Location: Right arm   Patient Position: Sitting   Cuff Size: Adult   Pulse: 77   Resp: 16   Temp: 96.2 °F (35.7 °C)   TempSrc: Temporal   SpO2: 96%   Weight: 95 kg (209 lb 6.4 oz)   Height: 167.6 cm (65.98\")     Body mass index is 33.81 kg/m².         Physical Exam  Vitals reviewed.   Constitutional:       Appearance: Normal appearance. She is well-developed.   HENT:      Head: Normocephalic and atraumatic.      Mouth/Throat:      Pharynx: No oropharyngeal exudate.   Eyes:      Conjunctiva/sclera: Conjunctivae normal.      Pupils: Pupils are equal, round, and reactive to light.   Cardiovascular:      Rate and Rhythm: Normal rate and regular rhythm.      Heart sounds: Normal heart sounds. No murmur heard.     No friction rub. No gallop.   Pulmonary:      Effort: Pulmonary " effort is normal.      Breath sounds: Normal breath sounds. No wheezing or rhonchi.   Skin:     General: Skin is warm and dry.   Neurological:      Mental Status: She is alert and oriented to person, place, and time.   Psychiatric:         Mood and Affect: Mood and affect normal.         Behavior: Behavior normal.         Thought Content: Thought content normal.         Judgment: Judgment normal.             Result Review :               Assessment and Plan     Diagnoses and all orders for this visit:    1. Gastroesophageal reflux disease without esophagitis (Primary)  -     esomeprazole (nexIUM) 40 MG capsule; Take 1 capsule by mouth Every Morning Before Breakfast.  Dispense: 90 capsule; Refill: 1    2. Gastroesophageal reflux disease, unspecified whether esophagitis present  -     famotidine (PEPCID) 40 MG tablet; Take 1 tablet by mouth 2 (Two) Times a Day As Needed for Heartburn.  Dispense: 180 tablet; Refill: 1    3. Neck pain  -     XR Spine Cervical Complete 4 or 5 View; Future  -     cyclobenzaprine (FLEXERIL) 10 MG tablet; Take 1 tablet by mouth 3 (Three) Times a Day As Needed for Muscle Spasms.  Dispense: 90 tablet; Refill: 1    4. Left elbow pain  -     XR Elbow 3+ View Left; Future  -     cyclobenzaprine (FLEXERIL) 10 MG tablet; Take 1 tablet by mouth 3 (Three) Times a Day As Needed for Muscle Spasms.  Dispense: 90 tablet; Refill: 1              Follow Up     Return for Next scheduled follow up.    Patient was given instructions and counseling regarding her condition or for health maintenance advice. Please see specific information pulled into the AVS if appropriate.

## 2024-05-17 DIAGNOSIS — R93.7 ABNORMAL X-RAY OF CERVICAL SPINE: ICD-10-CM

## 2024-05-17 DIAGNOSIS — M54.2 NECK PAIN: Primary | ICD-10-CM

## 2024-07-02 ENCOUNTER — HOSPITAL ENCOUNTER (OUTPATIENT)
Dept: MRI IMAGING | Facility: HOSPITAL | Age: 64
Discharge: HOME | End: 2024-07-02
Admitting: NURSE PRACTITIONER
Payer: OTHER GOVERNMENT

## 2024-07-02 DIAGNOSIS — M54.2 NECK PAIN: ICD-10-CM

## 2024-07-02 DIAGNOSIS — R93.7 ABNORMAL X-RAY OF CERVICAL SPINE: ICD-10-CM

## 2024-07-02 PROCEDURE — 72141 MRI NECK SPINE W/O DYE: CPT

## 2024-07-08 DIAGNOSIS — M54.2 NECK PAIN: Primary | ICD-10-CM

## 2024-07-08 DIAGNOSIS — R93.7 ABNORMAL MRI, CERVICAL SPINE: ICD-10-CM

## 2024-07-10 ENCOUNTER — TELEPHONE (OUTPATIENT)
Dept: FAMILY MEDICINE CLINIC | Facility: CLINIC | Age: 64
End: 2024-07-10

## 2024-07-10 ENCOUNTER — OFFICE VISIT (OUTPATIENT)
Dept: FAMILY MEDICINE CLINIC | Facility: CLINIC | Age: 64
End: 2024-07-10
Payer: OTHER GOVERNMENT

## 2024-07-10 VITALS
HEART RATE: 86 BPM | HEIGHT: 66 IN | OXYGEN SATURATION: 98 % | SYSTOLIC BLOOD PRESSURE: 126 MMHG | WEIGHT: 210 LBS | DIASTOLIC BLOOD PRESSURE: 72 MMHG | BODY MASS INDEX: 33.75 KG/M2 | TEMPERATURE: 96.4 F

## 2024-07-10 DIAGNOSIS — Z13.220 SCREENING FOR LIPID DISORDERS: ICD-10-CM

## 2024-07-10 DIAGNOSIS — M54.2 NECK PAIN: Primary | ICD-10-CM

## 2024-07-10 DIAGNOSIS — J30.9 ALLERGIC RHINITIS, UNSPECIFIED SEASONALITY, UNSPECIFIED TRIGGER: ICD-10-CM

## 2024-07-10 DIAGNOSIS — M54.40 CHRONIC BILATERAL LOW BACK PAIN WITH SCIATICA, SCIATICA LATERALITY UNSPECIFIED: ICD-10-CM

## 2024-07-10 DIAGNOSIS — L30.9 ECZEMA, UNSPECIFIED TYPE: ICD-10-CM

## 2024-07-10 DIAGNOSIS — M25.50 POLYARTHRALGIA: ICD-10-CM

## 2024-07-10 DIAGNOSIS — E03.9 HYPOTHYROIDISM, UNSPECIFIED TYPE: ICD-10-CM

## 2024-07-10 DIAGNOSIS — G89.29 CHRONIC BILATERAL LOW BACK PAIN WITH SCIATICA, SCIATICA LATERALITY UNSPECIFIED: ICD-10-CM

## 2024-07-10 PROCEDURE — 99214 OFFICE O/P EST MOD 30 MIN: CPT | Performed by: NURSE PRACTITIONER

## 2024-07-10 RX ORDER — LORATADINE 10 MG/1
10 TABLET ORAL DAILY
Qty: 90 TABLET | Refills: 1 | Status: SHIPPED | OUTPATIENT
Start: 2024-07-10

## 2024-07-10 RX ORDER — NACL/NAHCO3/HYALURON SOD/ALOE 0.9 %
1 GEL (GRAM) NASAL 2 TIMES DAILY
Qty: 28.4 G | Refills: 11 | Status: SHIPPED | OUTPATIENT
Start: 2024-07-10

## 2024-07-10 RX ORDER — DESONIDE 0.5 MG/G
1 CREAM TOPICAL 2 TIMES DAILY
Qty: 60 G | Refills: 0 | Status: SHIPPED | OUTPATIENT
Start: 2024-07-10

## 2024-07-10 RX ORDER — MELOXICAM 15 MG/1
15 TABLET ORAL DAILY
Qty: 90 TABLET | Refills: 0 | Status: SHIPPED | OUTPATIENT
Start: 2024-07-10

## 2024-07-10 RX ORDER — MONTELUKAST SODIUM 10 MG/1
10 TABLET ORAL NIGHTLY
Qty: 90 TABLET | Refills: 1 | Status: SHIPPED | OUTPATIENT
Start: 2024-07-10

## 2024-07-10 NOTE — PROGRESS NOTES
"Chief Complaint  Arthritis    Subjective         Judy Ramey presents to [facility]  Patient presents to the office to discuss her arthritis.  She states that the diclofenac does not seem to be helping as effectively as it once did.  I did discuss switching his to Mobic to see if this is a better choice.  Patient does request a referral to rheumatology as well.  I explained that we needed to obtain lab work to further evaluate.  She is due for routine lab works as well.  Blood pressure is 126/72.  She denies any chest pain shortness breath palpitations at this time I also discussed physical therapy with the patient after reviewing her MRI with her.  She states that she is willing to try this.    Arthritis         Objective     Vitals:    07/10/24 0743   BP: 126/72   BP Location: Right arm   Patient Position: Sitting   Cuff Size: Adult   Pulse: 86   Temp: 96.4 °F (35.8 °C)   TempSrc: Temporal   SpO2: 98%   Weight: 95.3 kg (210 lb)   Height: 167.6 cm (66\")      Body mass index is 33.89 kg/m².    BMI is >= 30 and <35. (Class 1 Obesity). The following options were offered after discussion;: nutrition counseling/recommendations        Physical Exam  Vitals reviewed.   Constitutional:       Appearance: Normal appearance.   Cardiovascular:      Rate and Rhythm: Normal rate and regular rhythm.      Pulses: Normal pulses.      Heart sounds: Normal heart sounds, S1 normal and S2 normal. No murmur heard.  Pulmonary:      Effort: Pulmonary effort is normal. No respiratory distress.      Breath sounds: Normal breath sounds.   Skin:     General: Skin is warm and dry.   Neurological:      Mental Status: She is alert and oriented to person, place, and time.   Psychiatric:         Attention and Perception: Attention normal.         Mood and Affect: Mood normal.         Behavior: Behavior normal.          Result Review :   The following data was reviewed by: FERNY Martin on " 07/10/2024:      Procedures    Assessment and Plan   Diagnoses and all orders for this visit:    1. Neck pain (Primary)  -     meloxicam (Mobic) 15 MG tablet; Take 1 tablet by mouth Daily.  Dispense: 90 tablet; Refill: 0  -     Ambulatory Referral to Physical Therapy    2. Allergic rhinitis, unspecified seasonality, unspecified trigger  -     loratadine (CLARITIN) 10 MG tablet; Take 1 tablet by mouth Daily.  Dispense: 90 tablet; Refill: 1  -     montelukast (SINGULAIR) 10 MG tablet; Take 1 tablet by mouth Every Night.  Dispense: 90 tablet; Refill: 1  -     Saline (NasoGel) gel; 1 spray into the nostril(s) as directed by provider 2 (Two) Times a Day.  Dispense: 28.4 g; Refill: 11  -     CBC (No Diff); Future  -     Comprehensive Metabolic Panel; Future    3. Eczema, unspecified type  -     desonide (DesOwen) 0.05 % cream; Apply 1 Application topically to the appropriate area as directed 2 (Two) Times a Day.  Dispense: 60 g; Refill: 0    4. Chronic bilateral low back pain with sciatica, sciatica laterality unspecified  -     meloxicam (Mobic) 15 MG tablet; Take 1 tablet by mouth Daily.  Dispense: 90 tablet; Refill: 0  -     Ambulatory Referral to Physical Therapy    5. Polyarthralgia  -     Uric acid; Future  -     Antistreptolysin O screen; Future  -     Rheumatoid Factor; Future  -     AYSE; Future  -     C-reactive protein; Future  -     CBC (No Diff); Future  -     Comprehensive Metabolic Panel; Future    6. Hypothyroidism, unspecified type  -     CBC (No Diff); Future  -     Comprehensive Metabolic Panel; Future  -     TSH+Free T4; Future    7. Screening for lipid disorders  -     Lipid Panel; Future          Follow Up   Return if symptoms worsen or fail to improve, for Next scheduled follow up.  Patient was given instructions and counseling regarding her condition or for health maintenance advice. Please see specific information pulled into the AVS if appropriate.

## 2024-07-10 NOTE — TELEPHONE ENCOUNTER
Caller: Judy Ramey    Relationship: Self    Best call back number: [phone]     Equipment requested: BACK BRACE    Reason for the request: LOWER BACK PAIN    Prescribing Provider: ABE DOWD    Additional information or concerns: PATIENT STATES THAT SHE HAS HAD TROUBLE WITH HER BACK FOR THE PAST FEW YEARS BUT IT HAS BEEN WORSE THE LAST FEW MONTHS.       SHE STATES THAT SHE FORGOT TO ASK FOR THE BRACE AT HER APPOINTMENT THIS MORNING.     PATIENT STATES THAT SHE WOULD LIKE A CALLBACK ABOUT THIS.

## 2024-07-11 NOTE — TELEPHONE ENCOUNTER
Patient wants a order for a tens unit also she stated that she talked to Milton about it at her appointment    well developed, well nourished , in no acute distress , ambulating without difficulty , normal communication ability

## 2024-07-12 DIAGNOSIS — G89.29 CHRONIC BILATERAL LOW BACK PAIN, UNSPECIFIED WHETHER SCIATICA PRESENT: Primary | ICD-10-CM

## 2024-07-12 DIAGNOSIS — M54.50 CHRONIC BILATERAL LOW BACK PAIN, UNSPECIFIED WHETHER SCIATICA PRESENT: Primary | ICD-10-CM

## 2024-07-12 RX ORDER — PEN NEEDLE, DIABETIC 32GX 5/32"
1 NEEDLE, DISPOSABLE MISCELLANEOUS DAILY PRN
Qty: 1 EACH | Refills: 0 | Status: SHIPPED | OUTPATIENT
Start: 2024-07-12

## 2024-07-15 ENCOUNTER — LAB (OUTPATIENT)
Dept: LAB | Facility: HOSPITAL | Age: 64
End: 2024-07-15
Payer: OTHER GOVERNMENT

## 2024-07-15 DIAGNOSIS — M25.50 POLYARTHRALGIA: ICD-10-CM

## 2024-07-15 DIAGNOSIS — Z13.220 SCREENING FOR LIPID DISORDERS: ICD-10-CM

## 2024-07-15 DIAGNOSIS — E03.9 HYPOTHYROIDISM, UNSPECIFIED TYPE: ICD-10-CM

## 2024-07-15 DIAGNOSIS — J30.9 ALLERGIC RHINITIS, UNSPECIFIED SEASONALITY, UNSPECIFIED TRIGGER: ICD-10-CM

## 2024-07-15 LAB
ALBUMIN SERPL-MCNC: 4.4 G/DL (ref 3.5–5.2)
ALBUMIN/GLOB SERPL: 1.8 G/DL
ALP SERPL-CCNC: 109 U/L (ref 39–117)
ALT SERPL W P-5'-P-CCNC: 15 U/L (ref 1–33)
ANION GAP SERPL CALCULATED.3IONS-SCNC: 12 MMOL/L (ref 5–15)
AST SERPL-CCNC: 20 U/L (ref 1–32)
BILIRUB SERPL-MCNC: 0.3 MG/DL (ref 0–1.2)
BUN SERPL-MCNC: 12 MG/DL (ref 8–23)
BUN/CREAT SERPL: 12.5 (ref 7–25)
CALCIUM SPEC-SCNC: 9.3 MG/DL (ref 8.6–10.5)
CHLORIDE SERPL-SCNC: 107 MMOL/L (ref 98–107)
CHOLEST SERPL-MCNC: 185 MG/DL (ref 0–200)
CHROMATIN AB SERPL-ACNC: 18.3 IU/ML (ref 0–14)
CO2 SERPL-SCNC: 26 MMOL/L (ref 22–29)
CREAT SERPL-MCNC: 0.96 MG/DL (ref 0.57–1)
CRP SERPL-MCNC: <0.3 MG/DL (ref 0–0.5)
DEPRECATED RDW RBC AUTO: 39.6 FL (ref 37–54)
EGFRCR SERPLBLD CKD-EPI 2021: 66.6 ML/MIN/1.73
ERYTHROCYTE [DISTWIDTH] IN BLOOD BY AUTOMATED COUNT: 12.6 % (ref 12.3–15.4)
GLOBULIN UR ELPH-MCNC: 2.5 GM/DL
GLUCOSE SERPL-MCNC: 97 MG/DL (ref 65–99)
HCT VFR BLD AUTO: 37.7 % (ref 34–46.6)
HDLC SERPL-MCNC: 59 MG/DL (ref 40–60)
HGB BLD-MCNC: 12.5 G/DL (ref 12–15.9)
LDLC SERPL CALC-MCNC: 108 MG/DL (ref 0–100)
LDLC/HDLC SERPL: 1.79 {RATIO}
MCH RBC QN AUTO: 28.7 PG (ref 26.6–33)
MCHC RBC AUTO-ENTMCNC: 33.2 G/DL (ref 31.5–35.7)
MCV RBC AUTO: 86.5 FL (ref 79–97)
PLATELET # BLD AUTO: 170 10*3/MM3 (ref 140–450)
PMV BLD AUTO: 10.4 FL (ref 6–12)
POTASSIUM SERPL-SCNC: 4.4 MMOL/L (ref 3.5–5.2)
PROT SERPL-MCNC: 6.9 G/DL (ref 6–8.5)
RBC # BLD AUTO: 4.36 10*6/MM3 (ref 3.77–5.28)
SODIUM SERPL-SCNC: 145 MMOL/L (ref 136–145)
T4 FREE SERPL-MCNC: 1.64 NG/DL (ref 0.92–1.68)
TRIGL SERPL-MCNC: 102 MG/DL (ref 0–150)
TSH SERPL DL<=0.05 MIU/L-ACNC: 0.82 UIU/ML (ref 0.27–4.2)
URATE SERPL-MCNC: 4.3 MG/DL (ref 2.4–5.7)
VLDLC SERPL-MCNC: 18 MG/DL (ref 5–40)
WBC NRBC COR # BLD AUTO: 4.07 10*3/MM3 (ref 3.4–10.8)

## 2024-07-15 PROCEDURE — 86431 RHEUMATOID FACTOR QUANT: CPT

## 2024-07-15 PROCEDURE — 86038 ANTINUCLEAR ANTIBODIES: CPT

## 2024-07-15 PROCEDURE — 85027 COMPLETE CBC AUTOMATED: CPT

## 2024-07-15 PROCEDURE — 84439 ASSAY OF FREE THYROXINE: CPT

## 2024-07-15 PROCEDURE — 86140 C-REACTIVE PROTEIN: CPT

## 2024-07-15 PROCEDURE — 86063 ANTISTREPTOLYSIN O SCREEN: CPT

## 2024-07-15 PROCEDURE — 36415 COLL VENOUS BLD VENIPUNCTURE: CPT

## 2024-07-15 PROCEDURE — 80061 LIPID PANEL: CPT

## 2024-07-15 PROCEDURE — 84550 ASSAY OF BLOOD/URIC ACID: CPT

## 2024-07-15 PROCEDURE — 80053 COMPREHEN METABOLIC PANEL: CPT

## 2024-07-15 PROCEDURE — 84443 ASSAY THYROID STIM HORMONE: CPT

## 2024-07-16 DIAGNOSIS — R76.8 ELEVATED RHEUMATOID FACTOR: Primary | ICD-10-CM

## 2024-07-16 LAB — ASO AB SERPL-ACNC: NEGATIVE [IU]/ML

## 2024-07-17 LAB — ANA SER QL: NEGATIVE

## 2024-07-24 ENCOUNTER — TELEPHONE (OUTPATIENT)
Dept: FAMILY MEDICINE CLINIC | Facility: CLINIC | Age: 64
End: 2024-07-24
Payer: OTHER GOVERNMENT

## 2024-07-24 NOTE — TELEPHONE ENCOUNTER
Nolvia from [facility] refaxing over a back brace form for patient. She is also needing a separate script for the ten units if the provider is wanting patient to have that as well.       Fax number : [phone]

## 2024-08-05 ENCOUNTER — TELEPHONE (OUTPATIENT)
Dept: FAMILY MEDICINE CLINIC | Facility: CLINIC | Age: 64
End: 2024-08-05
Payer: OTHER GOVERNMENT

## 2024-08-05 NOTE — TELEPHONE ENCOUNTER
Patient is needing her ten unit sent to [facility] on [address]. Even though she has the hard copy they wont accept it.

## 2024-08-08 ENCOUNTER — TELEPHONE (OUTPATIENT)
Dept: FAMILY MEDICINE CLINIC | Facility: CLINIC | Age: 64
End: 2024-08-08
Payer: OTHER GOVERNMENT

## 2024-08-08 NOTE — TELEPHONE ENCOUNTER
Caller: Judy Ramey    Relationship: Self    Best call back number: [phone]     What is the best time to reach you: ANY     What was the call regarding: CALLER STATED: RETURNING CALL AND REQUESTING A CALL BACK    Is it okay if the provider responds through MyChart: NO - CALL PREFERRED BU MYCHART IS OK

## 2024-08-09 NOTE — TELEPHONE ENCOUNTER
Called Judy, she states that she is returning a call. It looks like they were trying to reach her about her PT referral. She notes she is going in [place], and has been for 2 visits this week. I informed her I would notify the referral team. Judy verbalized understanding and thanks.

## 2024-08-13 ENCOUNTER — TELEPHONE (OUTPATIENT)
Dept: FAMILY MEDICINE CLINIC | Facility: CLINIC | Age: 64
End: 2024-08-13
Payer: OTHER GOVERNMENT

## 2024-08-13 NOTE — TELEPHONE ENCOUNTER
Patient called requesting to have back brace order faxed to [facility] on [address].   P [phone]  F [phone]    Order faxed per patient request. Patient aware   Albendazole Pregnancy And Lactation Text: This medication is Pregnancy Category C and it isn't known if it is safe during pregnancy. It is also excreted in breast milk.

## 2024-08-19 ENCOUNTER — TELEPHONE (OUTPATIENT)
Dept: FAMILY MEDICINE CLINIC | Facility: CLINIC | Age: 64
End: 2024-08-19
Payer: OTHER GOVERNMENT

## 2024-08-19 DIAGNOSIS — M25.50 POLYARTHRALGIA: ICD-10-CM

## 2024-08-19 DIAGNOSIS — R76.8 ELEVATED RHEUMATOID FACTOR: Primary | ICD-10-CM

## 2024-08-19 NOTE — TELEPHONE ENCOUNTER
Caller: Judy Ramey    Relationship: Self    Best call back number: [phone]    Any additional details: PATIENT IS NEEDING RHEUMATOLOGY REFERRAL TO BE SENT TO AN OFFICE LOCAL TO [facility]. SHE WAS REFERRED TO ONE IN [place] BUT THERE WAS ISSUES WITH SCHEDULING APPOINTMENT. SHE WOULD LIKE TO SEE YONATHAN IN [facility], BUT SHE HAS HEARD THAT YONATHAN'S OFFICE IS CLOSED.

## 2024-09-10 ENCOUNTER — OFFICE VISIT (OUTPATIENT)
Dept: NEUROSURGERY | Facility: CLINIC | Age: 64
End: 2024-09-10
Payer: OTHER GOVERNMENT

## 2024-09-10 VITALS
HEIGHT: 66 IN | BODY MASS INDEX: 34.02 KG/M2 | DIASTOLIC BLOOD PRESSURE: 78 MMHG | WEIGHT: 211.7 LBS | SYSTOLIC BLOOD PRESSURE: 134 MMHG

## 2024-09-10 DIAGNOSIS — M54.50 CHRONIC MIDLINE LOW BACK PAIN WITHOUT SCIATICA: Primary | ICD-10-CM

## 2024-09-10 DIAGNOSIS — G89.29 CHRONIC MIDLINE LOW BACK PAIN WITHOUT SCIATICA: Primary | ICD-10-CM

## 2024-09-10 DIAGNOSIS — M47.812 CERVICAL SPONDYLOSIS WITHOUT MYELOPATHY: ICD-10-CM

## 2024-09-10 PROCEDURE — 99204 OFFICE O/P NEW MOD 45 MIN: CPT | Performed by: NEUROLOGICAL SURGERY

## 2024-09-10 NOTE — PROGRESS NOTES
"Chief Complaint  Arm pain    Subjective          Judy Ramey who is a 64 y.o. year old female who presents to [facility] for Evaluation of the Spine.     The patient complains of pain located in the cervical and lumbar spine.  Patients states the pain has been present for 4 years.  The pain came on gradually.  The pain scale level is 8.  The pain  can radiate into the right shoulder with some associated arm and hand numbness .  The pain is waxing/waning and described as sharp.  The pain is worse at no particular time of day. Patient states nothing worsens the pain. Patient states  nothing  makes the pain better.    She has also been having lower back pain for about 4-5 years. This pain doesn't tend to spread. The lower back pain is usually a 10, but presently a 9/10. The lower back pain is worse with standing and better with sitting. No pattern to the pain. She has used pain patches without much help.     Associated Symptoms Include: Numbness and Tingling into the right hand, intermittent  Conservative Interventions Include: PT, not much improvement    Was this the result of an injury or accident? : No    History of Previous Spinal Surgery?: No    This patient  reports that she quit smoking about 25 years ago. Her smoking use included cigarettes. She started smoking about 30 years ago. She has a 5 pack-year smoking history. She has been exposed to tobacco smoke. She has never used smokeless tobacco.    Review of Systems   Musculoskeletal:  Positive for arthralgias, back pain and neck pain.        Objective   Vital Signs:   /78 (BP Location: Left arm, Patient Position: Sitting)   Ht 167.6 cm (66\")   Wt 96 kg (211 lb 11.2 oz)   BMI 34.17 kg/m²       Physical Exam  Constitutional:       Comments: BMI 34   Pulmonary:      Effort: Pulmonary effort is normal.   Musculoskeletal:         General: Tenderness (lumbar spine at waist and to a lesser degree cervical " spine) present.   Neurological:      Mental Status: She is alert.      Sensory: No sensory deficit.      Motor: No weakness.      Deep Tendon Reflexes: Reflexes normal (neg Hoffmans).      Comments: SLR causes pull behind the knee          Result Review :   I personally interpreted the patient's MRI scan which shows multilevel cervical spondylosis with mild spinal narrowing and foraminal narrowing.       Assessment and Plan    Diagnoses and all orders for this visit:    1. Chronic midline low back pain without sciatica (Primary)    2. Cervical spondylosis without myelopathy    I would not recommend surgery for the neck pain. She would potentially benefit from cervical strengthening.     We will obtain a lumbar MRI to evaluate the chronic lower back pain as she has never had an MRI of the lumbar region.      Follow Up   F/U after the lumbar MRI.   Patient was given instructions and counseling regarding her condition or for health maintenance advice. Please see specific information pulled into the AVS if appropriate.

## 2024-09-11 ENCOUNTER — PATIENT ROUNDING (BHMG ONLY) (OUTPATIENT)
Dept: NEUROSURGERY | Facility: CLINIC | Age: 64
End: 2024-09-11
Payer: OTHER GOVERNMENT

## 2024-09-20 DIAGNOSIS — M54.2 NECK PAIN: ICD-10-CM

## 2024-09-20 DIAGNOSIS — M54.40 CHRONIC BILATERAL LOW BACK PAIN WITH SCIATICA, SCIATICA LATERALITY UNSPECIFIED: ICD-10-CM

## 2024-09-20 DIAGNOSIS — G89.29 CHRONIC BILATERAL LOW BACK PAIN WITH SCIATICA, SCIATICA LATERALITY UNSPECIFIED: ICD-10-CM

## 2024-09-20 DIAGNOSIS — E03.9 HYPOTHYROIDISM, UNSPECIFIED TYPE: ICD-10-CM

## 2024-09-20 RX ORDER — MELOXICAM 15 MG/1
15 TABLET ORAL DAILY
Qty: 90 TABLET | Refills: 0 | Status: SHIPPED | OUTPATIENT
Start: 2024-09-20

## 2024-09-20 RX ORDER — LEVOTHYROXINE SODIUM 125 MCG
125 TABLET ORAL DAILY
Qty: 90 TABLET | Refills: 1 | Status: SHIPPED | OUTPATIENT
Start: 2024-09-20

## 2024-09-20 RX ORDER — BISACODYL 5 MG
1 TABLET, DELAYED RELEASE (ENTERIC COATED) ORAL DAILY
Qty: 90 TABLET | Refills: 3 | Status: SHIPPED | OUTPATIENT
Start: 2024-09-20

## 2024-09-26 ENCOUNTER — OFFICE VISIT (OUTPATIENT)
Dept: FAMILY MEDICINE CLINIC | Facility: CLINIC | Age: 64
End: 2024-09-26
Payer: OTHER GOVERNMENT

## 2024-09-26 VITALS
TEMPERATURE: 97.7 F | DIASTOLIC BLOOD PRESSURE: 70 MMHG | WEIGHT: 213.8 LBS | OXYGEN SATURATION: 98 % | RESPIRATION RATE: 16 BRPM | HEART RATE: 69 BPM | BODY MASS INDEX: 34.36 KG/M2 | SYSTOLIC BLOOD PRESSURE: 120 MMHG | HEIGHT: 66 IN

## 2024-09-26 DIAGNOSIS — J30.9 ALLERGIC RHINITIS, UNSPECIFIED SEASONALITY, UNSPECIFIED TRIGGER: ICD-10-CM

## 2024-09-26 DIAGNOSIS — H65.01 NON-RECURRENT ACUTE SEROUS OTITIS MEDIA OF RIGHT EAR: ICD-10-CM

## 2024-09-26 DIAGNOSIS — R50.9 FEVER, UNSPECIFIED FEVER CAUSE: Primary | ICD-10-CM

## 2024-09-26 PROCEDURE — 87428 SARSCOV & INF VIR A&B AG IA: CPT | Performed by: NURSE PRACTITIONER

## 2024-09-26 PROCEDURE — 99213 OFFICE O/P EST LOW 20 MIN: CPT | Performed by: NURSE PRACTITIONER

## 2024-09-26 RX ORDER — FLUTICASONE PROPIONATE 50 UG/1
2 SPRAY, METERED NASAL DAILY
Qty: 50 G | Refills: 4 | Status: SHIPPED | OUTPATIENT
Start: 2024-09-26

## 2024-09-26 RX ORDER — AZITHROMYCIN 250 MG/1
TABLET, FILM COATED ORAL
Qty: 6 TABLET | Refills: 0 | Status: SHIPPED | OUTPATIENT
Start: 2024-09-26

## 2024-09-27 ENCOUNTER — TELEPHONE (OUTPATIENT)
Dept: FAMILY MEDICINE CLINIC | Facility: CLINIC | Age: 64
End: 2024-09-27
Payer: OTHER GOVERNMENT

## 2024-09-27 RX ORDER — CALCIUM CARBONATE/VITAMIN D3 600 MG-10
2 TABLET ORAL DAILY
Qty: 180 TABLET | Refills: 1 | Status: SHIPPED | OUTPATIENT
Start: 2024-09-27

## 2024-10-02 ENCOUNTER — TELEPHONE (OUTPATIENT)
Dept: FAMILY MEDICINE CLINIC | Facility: CLINIC | Age: 64
End: 2024-10-02
Payer: OTHER GOVERNMENT

## 2024-10-02 NOTE — TELEPHONE ENCOUNTER
Caller: Judy Ramey    Relationship to patient: Self    Best call back number: [phone]    PATIENT IS CALLING BACK TO LET HER PROVIDER KNOW SHE WOULD LIKE TO SWITCH HER NEURO SURGEON TO DOCTOR JEREMY CHRISTIANSON     FAX NUMBER: [phone]    PHONE NUMBER: [phone]

## 2024-10-03 ENCOUNTER — TELEPHONE (OUTPATIENT)
Dept: OBSTETRICS AND GYNECOLOGY | Facility: CLINIC | Age: 64
End: 2024-10-03
Payer: OTHER GOVERNMENT

## 2024-10-03 VITALS
SYSTOLIC BLOOD PRESSURE: 130 MMHG | DIASTOLIC BLOOD PRESSURE: 78 MMHG | HEIGHT: 66 IN | HEART RATE: 65 BPM | WEIGHT: 214 LBS | BODY MASS INDEX: 34.39 KG/M2

## 2024-10-03 DIAGNOSIS — Z79.890 HORMONE REPLACEMENT THERAPY (HRT): ICD-10-CM

## 2024-10-03 DIAGNOSIS — Z01.411 ENCOUNTER FOR GYNECOLOGICAL EXAMINATION WITH ABNORMAL FINDING: Primary | ICD-10-CM

## 2024-10-03 DIAGNOSIS — N95.1 HOT FLASHES DUE TO MENOPAUSE: ICD-10-CM

## 2024-10-03 LAB
DEVELOPER EXPIRATION DATE: NORMAL
DEVELOPER LOT NUMBER: NORMAL
EXPIRATION DATE: NORMAL
FECAL OCCULT BLOOD SCREEN, POC: NEGATIVE
Lab: NORMAL
NEGATIVE CONTROL: NEGATIVE
POSITIVE CONTROL: POSITIVE

## 2024-10-03 RX ORDER — ESTRADIOL 0.1 MG/D
1 FILM, EXTENDED RELEASE TRANSDERMAL 2 TIMES WEEKLY
Qty: 24 PATCH | Refills: 4 | Status: SHIPPED | OUTPATIENT
Start: 2024-10-03

## 2024-10-03 NOTE — PROGRESS NOTES
"Well Woman Visit    CC: Annual well woman exam       HPI:   64 y.o. Contraception or HRT: Post menopausal, s/p HYST, still has one or both ovaries, benign indications  Menses:  none  Pain:  None  Incontinence concerns: No  Hx of abnormal pap:  No  Pt states she uses the twice weekly estrogen patches as directed but still has a lot of hot flashes. Wants to discuss increasing the dose.       History: PMHx, Meds, Allergies, PSHx, Social Hx, and POBHx all reviewed and updated.      PHYSICAL EXAM:  /78   Pulse 65   Ht 167.6 cm (65.98\")   Wt 97.1 kg (214 lb)   BMI 34.56 kg/m²   General- NAD, alert and oriented, appropriate  Psych- Normal mood, good memory  Neck- No masses, no thyroid enlargement  CV- Regular rhythm, no murnurs  Resp- CTA to bases, no wheezes  Abdomen- Soft, non distended, non tender, no masses    Breast left-  Bilaterally symmetrical, no masses, non tender, no nipple discharge  Breast right- Bilaterally symmetrical, no masses, non tender, no nipple discharge    External genitalia- Normal female, no lesions  Urethra/meatus- Normal, no masses, non tender, no prolapse  Bladder- Normal, no masses, non tender, no prolapse  Vagina- Normal, no atrophy, no lesions, no discharge, no prolapse  Cvx- Absent  Uterus- Absent  Adnexa- No mass, non tender  Anus/Rectum/Perineum- Normal appearance, no mass, good sphincter tone, no hemorrhoids, no prolapse , Hemoccult neg    Lymphatic- No palpable neck, axillary, or groin nodes  Ext- No edema, no cyanosis    Skin- No lesions, no rashes, no acanthosis nigricans        ASSESSMENT and PLAN:  WWE, HRT/ERT Surveillance, and Problem Visit    Diagnoses and all orders for this visit:    1. Encounter for gynecological examination with abnormal finding (Primary)  -     POC Occult Blood Stool  -     Mammo Screening Digital Tomosynthesis Bilateral With CAD; Future    2. Hot flashes due to menopause  -     estradiol (VIVELLE-DOT) 0.1 MG/24HR patch; Place 1 patch on the " skin as directed by provider 2 (Two) Times a Week.  Dispense: 24 patch; Refill: 4    3. Hormone replacement therapy (HRT)  -     estradiol (VIVELLE-DOT) 0.1 MG/24HR patch; Place 1 patch on the skin as directed by provider 2 (Two) Times a Week.  Dispense: 24 patch; Refill: 4    Discussed r/b/se of hrt. Desires stopping the 0.05 mg dose and trying the 0.1 mg dose. She will f/u if sx persist.     Counseling:     OCP/Hormone use risk JENNIFER    Domestic violence/abuse screen: negative    Depression screen: no SI    Preventative:   BREAST HEALTH- Monthly self breast exam importance and how to reviewed. MMG and/or MRI (prn) reviewed per society guidelines and her individual history. Mammo/MRI screen: Updated today.  CERVICAL CANCER Screening- Reviewed current ASCCP guidelines for screening w and wo cotest HPV, age specific.  Screen: Not medically needed.  COLON CANCER Screening- Reviewed current medical society guidelines and options.  Colonoscopy screen:  Already up to date.  SEXUAL HEALTH: Declines STD screening.  BONE HEALTH- Reviewed current medical society guidelines and options for testing, calcium and vit D intake.  Weight bearing exercise.  DEXA: Already up to date.  VACCINATIONS Recommended: Flu annually, Zoster (49yo and older).  Importance discussed, risk being unvaccinated reviewed.  Questions answered  Smoking status- NON SMOKER.  Importance of avoiding second hand smoke.  Follow up PCP/Specialist PMHx and Labs      She understands the importance of having any ordered tests to be performed in a timely fashion.  She is encouraged to review her results online and/or contact or office if she has questions.     Follow Up:  Return in about 1 year (around 10/3/2025) for Annual physical.      Gaby Segundo, FERNY  10/03/2024

## 2024-10-07 NOTE — TELEPHONE ENCOUNTER
Made multiple attempts to contact. Pt will need to reach out to the office to inform office of change.

## 2024-10-18 ENCOUNTER — HOSPITAL ENCOUNTER (OUTPATIENT)
Dept: ULTRASOUND IMAGING | Facility: HOSPITAL | Age: 64
Discharge: HOME | End: 2024-10-18
Payer: OTHER GOVERNMENT

## 2024-10-18 ENCOUNTER — HOSPITAL ENCOUNTER (OUTPATIENT)
Dept: MRI IMAGING | Facility: HOSPITAL | Age: 64
Discharge: HOME | End: 2024-10-18
Payer: OTHER GOVERNMENT

## 2024-10-18 DIAGNOSIS — M79.89 SOFT TISSUE MASS: ICD-10-CM

## 2024-10-18 DIAGNOSIS — G89.29 CHRONIC MIDLINE LOW BACK PAIN WITHOUT SCIATICA: ICD-10-CM

## 2024-10-18 DIAGNOSIS — M54.50 CHRONIC MIDLINE LOW BACK PAIN WITHOUT SCIATICA: ICD-10-CM

## 2024-10-18 PROCEDURE — 72148 MRI LUMBAR SPINE W/O DYE: CPT

## 2024-10-18 PROCEDURE — 76999 ECHO EXAMINATION PROCEDURE: CPT

## 2024-10-22 ENCOUNTER — OFFICE VISIT (OUTPATIENT)
Dept: NEUROSURGERY | Facility: CLINIC | Age: 64
End: 2024-10-22
Payer: OTHER GOVERNMENT

## 2024-10-22 VITALS
DIASTOLIC BLOOD PRESSURE: 80 MMHG | WEIGHT: 217.8 LBS | HEIGHT: 66 IN | SYSTOLIC BLOOD PRESSURE: 127 MMHG | BODY MASS INDEX: 35 KG/M2

## 2024-10-22 DIAGNOSIS — M47.816 FACET ARTHRITIS OF LUMBAR REGION: ICD-10-CM

## 2024-10-22 DIAGNOSIS — M54.50 CHRONIC MIDLINE LOW BACK PAIN WITHOUT SCIATICA: Primary | ICD-10-CM

## 2024-10-22 DIAGNOSIS — G89.29 CHRONIC MIDLINE LOW BACK PAIN WITHOUT SCIATICA: Primary | ICD-10-CM

## 2024-10-22 NOTE — PROGRESS NOTES
Judy Ramey is a 64 y.o. female that presents with Chronic midline low back pain without sciatica       She has continued pain in the lower back. We reviewed the MRI of her lower back.     History of Present Illness        Review of Systems   Musculoskeletal:  Positive for back pain.        Vitals:    10/22/24 0854   BP: 127/80        Strength well preserved in the bilateral lower extremities.    Physical Exam         Results       I personally interpreted the patient's MRI scan which shows anterolisthesis at L3-4 and L4-5 secondary to facet arthritis. Moderate stenosis at each level. Epidural lipomatosis involving the lumbar spine, particularly L5-S1.      Assessment and Plan {CC Problem List  Visit Diagnosis  ROS  Review (Popup)  [facility] Maintenance  Quality  BestPractice  Medications  SmartSets  SnapShot Encounters  Media :23}   Problem List Items Addressed This Visit       Low back pain - Primary     Other Visit Diagnoses       Facet arthritis of lumbar region              She will monitor pain down the leg, particularly below the knee.     I will refer for a facet injection in an attempt to reduce the lower back pain.     She will work on gentle core strengthening.   Assessment & Plan        Follow Up {Instructions Charge Capture  Follow-up Communications :23}   Return if symptoms worsen or fail to improve.

## 2024-10-24 ENCOUNTER — OFFICE VISIT (OUTPATIENT)
Dept: FAMILY MEDICINE CLINIC | Facility: CLINIC | Age: 64
End: 2024-10-24
Payer: OTHER GOVERNMENT

## 2024-10-24 VITALS
SYSTOLIC BLOOD PRESSURE: 139 MMHG | HEIGHT: 66 IN | HEART RATE: 72 BPM | BODY MASS INDEX: 34.68 KG/M2 | DIASTOLIC BLOOD PRESSURE: 79 MMHG | TEMPERATURE: 97.5 F | WEIGHT: 215.8 LBS | OXYGEN SATURATION: 97 % | RESPIRATION RATE: 20 BRPM

## 2024-10-24 DIAGNOSIS — R05.9 COUGH, UNSPECIFIED TYPE: ICD-10-CM

## 2024-10-24 DIAGNOSIS — R09.81 SINUS CONGESTION: Primary | ICD-10-CM

## 2024-10-24 DIAGNOSIS — H65.113 NON-RECURRENT ACUTE ALLERGIC OTITIS MEDIA OF BOTH EARS: ICD-10-CM

## 2024-10-24 PROCEDURE — 87428 SARSCOV & INF VIR A&B AG IA: CPT | Performed by: STUDENT IN AN ORGANIZED HEALTH CARE EDUCATION/TRAINING PROGRAM

## 2024-10-24 PROCEDURE — 99213 OFFICE O/P EST LOW 20 MIN: CPT | Performed by: STUDENT IN AN ORGANIZED HEALTH CARE EDUCATION/TRAINING PROGRAM

## 2024-10-24 RX ORDER — PREDNISONE 20 MG/1
20 TABLET ORAL 2 TIMES DAILY
Qty: 10 TABLET | Refills: 0 | Status: SHIPPED | OUTPATIENT
Start: 2024-10-24

## 2024-10-24 RX ORDER — CEFDINIR 300 MG/1
300 CAPSULE ORAL 2 TIMES DAILY
Qty: 20 CAPSULE | Refills: 0 | Status: SHIPPED | OUTPATIENT
Start: 2024-10-24

## 2024-10-24 RX ORDER — BROMPHENIRAMINE MALEATE, PSEUDOEPHEDRINE HYDROCHLORIDE, AND DEXTROMETHORPHAN HYDROBROMIDE 2; 30; 10 MG/5ML; MG/5ML; MG/5ML
7 SYRUP ORAL 4 TIMES DAILY PRN
Qty: 118 ML | Refills: 0 | Status: SHIPPED | OUTPATIENT
Start: 2024-10-24

## 2024-10-24 NOTE — PROGRESS NOTES
"Chief Complaint  Cough/congestion/sinus drainage    Subjective         Judy Ramey is a 64 y.o. female who presents to [facility]    64 years old comes to the clinic today for an acute visit.    Complaining of 2 days active symptoms of worsening cough/congestion/sinus drainage and sinus pressure.  Does report body aches but does not report any fever/chest pain or shortness of breath.  No sick contact or recent travel.  Does not report any anosmia or any GI symptoms.  Reporting lots of congestion of sinuses and drainage causing some headaches but no palpitations/dizziness.    No other acute concerns    Objective   Vital Signs:   Vitals:    10/24/24 1136   BP: 139/79   Pulse: 72   Resp: 20   Temp: 97.5 °F (36.4 °C)   SpO2: 97%   Weight: 97.9 kg (215 lb 12.8 oz)   Height: 167.6 cm (65.98\")      Body mass index is 34.85 kg/m².   Wt Readings from Last 3 Encounters:   10/24/24 97.9 kg (215 lb 12.8 oz)   10/22/24 98.8 kg (217 lb 12.8 oz)   10/03/24 97.1 kg (214 lb)      BP Readings from Last 3 Encounters:   10/24/24 139/79   10/22/24 127/80   10/03/24 130/78        Patient Care Team:  Milotn Zamarripa APRN as PCP - General (Nurse Practitioner)  Li Hernandez APRN as Nurse Practitioner (Pulmonary Disease)     Physical Exam  HENT:      Right Ear: Tympanic membrane is erythematous and bulging.      Left Ear: Tympanic membrane is erythematous and bulging.      Mouth/Throat:      Pharynx: Posterior oropharyngeal erythema present. No pharyngeal swelling.      Comments: Very inflamed pharyngeal mucosal lining with yellow sinus drainage  Pulmonary:      Effort: Pulmonary effort is normal.      Breath sounds: Normal breath sounds.                            Assessment and Plan   Diagnoses and all orders for this visit:    1. Sinus congestion (Primary)  -     POCT SARS-CoV-2 Antigen VIPIN + Flu  -     cefdinir (OMNICEF) 300 MG capsule; Take 1 capsule by mouth 2 (Two) Times a Day.  Dispense: 20 " capsule; Refill: 0  -     predniSONE (DELTASONE) 20 MG tablet; Take 1 tablet by mouth 2 (Two) Times a Day.  Dispense: 10 tablet; Refill: 0  -     brompheniramine-pseudoephedrine-DM 30-2-10 MG/5ML syrup; Take 7 mL by mouth 4 (Four) Times a Day As Needed for Cough or Congestion.  Dispense: 118 mL; Refill: 0    2. Cough, unspecified type  -     POCT SARS-CoV-2 Antigen VIPIN + Flu  -     cefdinir (OMNICEF) 300 MG capsule; Take 1 capsule by mouth 2 (Two) Times a Day.  Dispense: 20 capsule; Refill: 0  -     predniSONE (DELTASONE) 20 MG tablet; Take 1 tablet by mouth 2 (Two) Times a Day.  Dispense: 10 tablet; Refill: 0  -     brompheniramine-pseudoephedrine-DM 30-2-10 MG/5ML syrup; Take 7 mL by mouth 4 (Four) Times a Day As Needed for Cough or Congestion.  Dispense: 118 mL; Refill: 0    3. Non-recurrent acute allergic otitis media of both ears  -     cefdinir (OMNICEF) 300 MG capsule; Take 1 capsule by mouth 2 (Two) Times a Day.  Dispense: 20 capsule; Refill: 0  -     predniSONE (DELTASONE) 20 MG tablet; Take 1 tablet by mouth 2 (Two) Times a Day.  Dispense: 10 tablet; Refill: 0  -     brompheniramine-pseudoephedrine-DM 30-2-10 MG/5ML syrup; Take 7 mL by mouth 4 (Four) Times a Day As Needed for Cough or Congestion.  Dispense: 118 mL; Refill: 0      Negative rapid swabs in the clinic.  We will start empiric treatment, patient to call if not improved in next 2 days or any worsening.  Patient will need further evaluation if not improved.    Tobacco Use: Medium Risk (10/24/2024)    Patient History     Smoking Tobacco Use: Former     Smokeless Tobacco Use: Never     Passive Exposure: Past            Follow Up   No follow-ups on file.  Patient was given instructions and counseling regarding her condition or for health maintenance advice. Please see specific information pulled into the AVS if appropriate.

## 2024-10-29 DIAGNOSIS — M79.89 SOFT TISSUE MASS: Primary | ICD-10-CM

## 2024-10-31 ENCOUNTER — OFFICE VISIT (OUTPATIENT)
Dept: FAMILY MEDICINE CLINIC | Facility: CLINIC | Age: 64
End: 2024-10-31
Payer: OTHER GOVERNMENT

## 2024-10-31 VITALS
HEART RATE: 84 BPM | OXYGEN SATURATION: 96 % | RESPIRATION RATE: 16 BRPM | DIASTOLIC BLOOD PRESSURE: 68 MMHG | BODY MASS INDEX: 34.88 KG/M2 | WEIGHT: 216 LBS | TEMPERATURE: 98.2 F | SYSTOLIC BLOOD PRESSURE: 130 MMHG

## 2024-10-31 DIAGNOSIS — R52 BODY ACHES: ICD-10-CM

## 2024-10-31 DIAGNOSIS — R06.02 SHORTNESS OF BREATH: ICD-10-CM

## 2024-10-31 DIAGNOSIS — R09.81 CONGESTION OF NASAL SINUS: ICD-10-CM

## 2024-10-31 DIAGNOSIS — Z00.00 ANNUAL PHYSICAL EXAM: Primary | ICD-10-CM

## 2024-10-31 DIAGNOSIS — J30.9 ALLERGIC RHINITIS, UNSPECIFIED SEASONALITY, UNSPECIFIED TRIGGER: ICD-10-CM

## 2024-10-31 PROCEDURE — 99213 OFFICE O/P EST LOW 20 MIN: CPT | Performed by: NURSE PRACTITIONER

## 2024-10-31 PROCEDURE — 87428 SARSCOV & INF VIR A&B AG IA: CPT | Performed by: NURSE PRACTITIONER

## 2024-10-31 RX ORDER — SOD CHLOR,BICARB/SQUEEZ BOTTLE
1 PACKET, WITH RINSE DEVICE NASAL 2 TIMES DAILY PRN
Qty: 100 EACH | Refills: 1 | Status: SHIPPED | OUTPATIENT
Start: 2024-10-31

## 2024-10-31 RX ORDER — NACL/NAHCO3/HYALURON SOD/ALOE 0.9 %
1 GEL (GRAM) NASAL 2 TIMES DAILY
Qty: 28.4 G | Refills: 11 | Status: SHIPPED | OUTPATIENT
Start: 2024-10-31

## 2024-10-31 NOTE — PROGRESS NOTES
Chief Complaint  Sore Throat, Cough (Pain in chest), Shortness of Breath, Nasal Congestion, Sinusitis, Generalized Body Aches, and Annual Exam    Subjective        Judy Ramey presents to [facility]  History of Present Illness  Annual exam:  cares for mom and grandchild.    Was seen last week and placed on antibiotic and was doing better until yesterday when started having body aches and chills and feverish.  Had left arm  pain and some shortness of breath last night but better today.  Shortness of breath still but arm pain able to go to sleep and denies chest pain.  Sore Throat   Associated symptoms include coughing and shortness of breath.   Cough  Associated symptoms include a sore throat and shortness of breath. Pertinent negatives include no chest pain or fever.   Shortness of Breath  Associated symptoms include a sore throat. Pertinent negatives include no chest pain, fever or leg swelling.   Sinusitis  Associated symptoms include coughing, shortness of breath and a sore throat.       The following portions of the patient's history were personally reviewed and updated as appropriate: allergies, current medications, past medical history, past surgical history, past family history, and past social history.     Body mass index is 34.88 kg/m².           Past History:    Medical History: has a past medical history of Abnormal Pap smear of cervix, Allergic, Anxiety, Asthma, Cataracts, bilateral, Change in voice, Cough, Depression, Dry mouth, Fibroid, GERD (gastroesophageal reflux disease) (08/28/2019), History of tobacco use, Hoarseness of voice, Hypothyroidism (08/28/2019), Laryngeal candidiasis, Low back pain, and Osteoarthritis.     Surgical History: has a past surgical history that includes Colonoscopy; Other surgical history; Upper gastrointestinal endoscopy; Knee arthroscopy w/ meniscal repair (Right); Esophagogastroduodenoscopy (N/A, 01/11/2022); Hysterectomy; and  Endometrial ablation.     Family History: family history includes Arthritis in her father and mother; Breast cancer in her half-sister; Cancer in her sister; Dementia in her mother; Diabetes in her father and mother; Heart disease in her father and mother; Stroke in her father.     Social History: reports that she quit smoking about 25 years ago. Her smoking use included cigarettes. She started smoking about 30 years ago. She has a 5 pack-year smoking history. She has been exposed to tobacco smoke. She has never used smokeless tobacco. She reports that she does not currently use alcohol. She reports that she does not use drugs.    Allergies: Patient has no known allergies.          Current Outpatient Medications:     albuterol sulfate HFA (ProAir HFA) 108 (90 Base) MCG/ACT inhaler, Inhale 2 puffs Every 4 (Four) Hours As Needed for Wheezing., Disp: 54 g, Rfl: 4    Alcohol Swabs (Easy Touch Alcohol Prep Medium) 70 % pads, Apply 1 each topically to the appropriate area as directed Daily As Needed (as needed)., Disp: 100 each, Rfl: 2    amitriptyline (ELAVIL) 25 MG tablet, Take 1 tablet by mouth At Night As Needed for Sleep., Disp: 90 tablet, Rfl: 0    AYR Saline Nasal Rinse 1.57 g pack, Administer 1 each into the nostril(s) as directed by provider 2 (Two) Times a Day As Needed (congestion)., Disp: 100 each, Rfl: 1    azelastine (ASTELIN) 0.1 % nasal spray, 2 sprays into the nostril(s) as directed by provider 2 (Two) Times a Day. Use in each nostril as directed, Disp: 90 mL, Rfl: 4    brompheniramine-pseudoephedrine-DM 30-2-10 MG/5ML syrup, Take 7 mL by mouth 4 (Four) Times a Day As Needed for Cough or Congestion., Disp: 118 mL, Rfl: 0    calcium carb-cholecalciferol 600-10 MG-MCG tablet per tablet, Take 2 tablets by mouth Daily., Disp: 180 tablet, Rfl: 1    cefdinir (OMNICEF) 300 MG capsule, Take 1 capsule by mouth 2 (Two) Times a Day., Disp: 20 capsule, Rfl: 0    cyclobenzaprine (FLEXERIL) 10 MG tablet, Take 1 tablet  by mouth 3 (Three) Times a Day As Needed for Muscle Spasms., Disp: 90 tablet, Rfl: 1    desonide (DesOwen) 0.05 % cream, Apply 1 Application topically to the appropriate area as directed 2 (Two) Times a Day., Disp: 60 g, Rfl: 0    Diclofenac Sodium (VOLTAREN) 1 % gel gel, Apply 4 g topically to the appropriate area as directed 3 (Three) Times a Day., Disp: 100 g, Rfl: 2    Dupilumab (Dupixent) 200 MG/1.14ML solution prefilled syringe, Inject 200 mg under the skin into the appropriate area as directed Every 14 (Fourteen) Days., Disp: 6.84 mL, Rfl: 3    escitalopram (LEXAPRO) 10 MG tablet, Take 1 tablet by mouth Daily., Disp: 90 tablet, Rfl: 1    esomeprazole (nexIUM) 40 MG capsule, Take 1 capsule by mouth Every Morning Before Breakfast., Disp: 90 capsule, Rfl: 1    estradiol (VIVELLE-DOT) 0.1 MG/24HR patch, Place 1 patch on the skin as directed by provider 2 (Two) Times a Week., Disp: 24 patch, Rfl: 4    famotidine (PEPCID) 40 MG tablet, Take 1 tablet by mouth 2 (Two) Times a Day As Needed for Heartburn., Disp: 180 tablet, Rfl: 1    fluticasone (FLONASE) 50 MCG/ACT nasal spray, Administer 2 sprays into the nostril(s) as directed by provider Daily., Disp: 50 g, Rfl: 4    fluticasone-salmeterol (Advair HFA) 230-21 MCG/ACT inhaler, Inhale 2 puffs 2 (Two) Times a Day., Disp: 36 g, Rfl: 3    hydrOXYzine (ATARAX) 25 MG tablet, Take 1 tablet by mouth 3 (Three) Times a Day As Needed for Anxiety., Disp: 90 tablet, Rfl: 0    ipratropium-albuterol (DUO-NEB) 0.5-2.5 mg/3 ml nebulizer, Take 3 mL by nebulization 4 (Four) Times a Day., Disp: 360 mL, Rfl: 11    lidocaine (LIDODERM) 5 %, Place 1 patch on the skin as directed by provider Daily. Remove & Discard patch within 12 hours or as directed by MD, Disp: 90 patch, Rfl: 3    loratadine (CLARITIN) 10 MG tablet, Take 1 tablet by mouth Daily., Disp: 90 tablet, Rfl: 1    meloxicam (Mobic) 15 MG tablet, Take 1 tablet by mouth Daily., Disp: 90 tablet, Rfl: 0    montelukast (SINGULAIR)  10 MG tablet, Take 1 tablet by mouth Every Night., Disp: 90 tablet, Rfl: 1    multivitamin with minerals tablet tablet, Take 1 tablet by mouth Daily., Disp: , Rfl:     Nerve Stimulator (Pro Comfort Tens Unit) device, Use 1 each Daily As Needed (low back pain)., Disp: 1 each, Rfl: 0    PreviDent 5000 Plus 1.1 % cream, , Disp: , Rfl:     Saline (NasoGel) gel, Administer 1 spray into the nostril(s) as directed by provider 2 (Two) Times a Day., Disp: 28.4 g, Rfl: 11    Synthroid 125 MCG tablet, Take 1 tablet by mouth Daily., Disp: 90 tablet, Rfl: 1    tolterodine LA (DETROL LA) 2 MG 24 hr capsule, Take 1 capsule by mouth Daily., Disp: 90 capsule, Rfl: 1    vitamin C (ASCORBIC ACID) 500 MG tablet, Take 1 tablet by mouth Daily., Disp: 90 tablet, Rfl: 11    Xiidra 5 % ophthalmic solution, Administer 1 drop to both eyes 2 (Two) Times a Day., Disp: 15 each, Rfl: 2    azithromycin (Zithromax Z-Carlton) 250 MG tablet, Take 2 tablets by mouth on day 1, then 1 tablet daily on days 2-5 (Patient not taking: Reported on 10/31/2024), Disp: 6 tablet, Rfl: 0    Hypertonic Nasal Wash (NasaFlo Neti Pot Nasal Wash) pack, 1 ampule into the nostril(s) as directed by provider Daily., Disp: 50 each, Rfl: 11    Lubricating Plus Eye Drops 0.5 % solution, Administer 2 drops to both eyes Daily As Needed for Dry Eyes. (Patient not taking: Reported on 10/31/2024), Disp: 70 each, Rfl: 1    predniSONE (DELTASONE) 20 MG tablet, Take 1 tablet by mouth 2 (Two) Times a Day. (Patient not taking: Reported on 10/31/2024), Disp: 10 tablet, Rfl: 0    traMADol (ULTRAM) 50 MG tablet, Take 1 tablet by mouth Every 6 (Six) Hours As Needed for Moderate Pain. (Patient not taking: Reported on 10/31/2024), Disp: 30 tablet, Rfl: 0    Medications Discontinued During This Encounter   Medication Reason    AYR Saline Nasal Rinse 1.57 g pack Reorder    Saline (NasoGel) gel Reorder         Review of Systems   Constitutional:  Negative for fever.   HENT:  Positive for sore  throat.    Respiratory:  Positive for cough and shortness of breath.    Cardiovascular:  Negative for chest pain, palpitations and leg swelling.   Neurological:  Negative for dizziness, weakness, numbness and headache.        Objective         Vitals:    10/31/24 1031   BP: 130/68   BP Location: Right arm   Patient Position: Sitting   Cuff Size: Large Adult   Pulse: 84   Resp: 16   Temp: 98.2 °F (36.8 °C)   TempSrc: Temporal   SpO2: 96%   Weight: 98 kg (216 lb)     Body mass index is 34.88 kg/m².         Physical Exam  Vitals reviewed.   Constitutional:       Appearance: Normal appearance. She is well-developed.   HENT:      Head: Normocephalic and atraumatic.      Mouth/Throat:      Pharynx: No oropharyngeal exudate.   Eyes:      Conjunctiva/sclera: Conjunctivae normal.      Pupils: Pupils are equal, round, and reactive to light.   Cardiovascular:      Rate and Rhythm: Normal rate and regular rhythm.      Heart sounds: Normal heart sounds. No murmur heard.     No friction rub. No gallop.   Pulmonary:      Effort: Pulmonary effort is normal.      Breath sounds: Normal breath sounds. No wheezing or rhonchi.   Abdominal:      General: Bowel sounds are normal.      Palpations: Abdomen is soft.      Tenderness: There is no abdominal tenderness.   Skin:     General: Skin is warm and dry.   Neurological:      Mental Status: She is alert and oriented to person, place, and time.   Psychiatric:         Mood and Affect: Mood and affect normal.         Behavior: Behavior normal.         Thought Content: Thought content normal.         Judgment: Judgment normal.             Result Review :  flu last wekk               Assessment and Plan     Diagnoses and all orders for this visit:    1. Annual physical exam (Primary)  Comments:  discussed diet and exercise.    2. Allergic rhinitis, unspecified seasonality, unspecified trigger  -     AYR Saline Nasal Rinse 1.57 g pack; Administer 1 each into the nostril(s) as directed by  provider 2 (Two) Times a Day As Needed (congestion).  Dispense: 100 each; Refill: 1  -     Saline (NasoGel) gel; Administer 1 spray into the nostril(s) as directed by provider 2 (Two) Times a Day.  Dispense: 28.4 g; Refill: 11    3. Congestion of nasal sinus  -     POCT SARS-CoV-2 + Flu Antigen VIPIN    4. Body aches  -     POCT SARS-CoV-2 + Flu Antigen VIPIN    5. Shortness of breath              Follow Up     Return for Next scheduled follow up.    Patient was given instructions and counseling regarding her condition or for health maintenance advice. Please see specific information pulled into the AVS if appropriate.

## 2024-11-07 ENCOUNTER — TELEPHONE (OUTPATIENT)
Dept: FAMILY MEDICINE CLINIC | Facility: CLINIC | Age: 64
End: 2024-11-07

## 2024-11-07 NOTE — TELEPHONE ENCOUNTER
Caller: Judy Ramey    Relationship: Self    Best call back number:     [phone]       What is the medical concern/diagnosis: BACK PAIN    What specialty or service is being requested: RHEUMATOLOGY    What is the provider, practice or medical service name: DR RENETTA WELLINGTON    What is the office location: [address] [place] [address]     What is the office phone number: [phone]    Any additional details:       FAX: [phone]

## 2024-11-08 DIAGNOSIS — M25.50 POLYARTHRALGIA: Primary | ICD-10-CM

## 2024-11-11 ENCOUNTER — TELEPHONE (OUTPATIENT)
Dept: FAMILY MEDICINE CLINIC | Facility: CLINIC | Age: 64
End: 2024-11-11

## 2024-11-11 NOTE — TELEPHONE ENCOUNTER
Caller: Judy Ramey    Relationship: Self    Best call back number: [phone]    What is the medical concern/diagnosis: YEARLY CHECK UPS     What specialty or service is being requested: OBGYN     What is the provider, practice or medical service name: PATIENT STATED SHE'S BEEN REFERRED TO OBGYN BEFORE AND WOULD LIKE TO RETURN TO THEM ON [address].

## 2024-11-11 NOTE — TELEPHONE ENCOUNTER
Caller: Judy Ramey    Relationship: Self    Best call back number: [phone]    What is the best time to reach you: ANY    Who are you requesting to speak with (clinical staff, provider,  specific staff member): REFERRALS     What was the call regarding: PATIENT IS NEEDING AN EXTENSION ON HER RHEUMATOLOGY REFERRAL BECAUSE SHE CANNOT GET AN APPOINTMENT UNTIL JULY 2025

## 2024-12-02 ENCOUNTER — TELEPHONE (OUTPATIENT)
Dept: FAMILY MEDICINE CLINIC | Facility: CLINIC | Age: 64
End: 2024-12-02

## 2024-12-02 NOTE — TELEPHONE ENCOUNTER
Caller: Judy Ramey    Relationship: Self    Best call back number:   Telephone Information:   Mobile [phone]        What is the best time to reach you: ANYTIME     Who are you requesting to speak with (clinical staff, provider,  specific staff member): CLINICAL     What was the call regarding: CHECKING ON STATUS OF REFERRAL TO RHEUMATOLOGY   PLEASE UPDATE PATIENT

## 2024-12-30 ENCOUNTER — HOSPITAL ENCOUNTER (OUTPATIENT)
Dept: MAMMOGRAPHY | Facility: HOSPITAL | Age: 64
Discharge: HOME | End: 2024-12-30
Payer: OTHER GOVERNMENT

## 2024-12-30 ENCOUNTER — HOSPITAL ENCOUNTER (OUTPATIENT)
Dept: MRI IMAGING | Facility: HOSPITAL | Age: 64
Discharge: HOME | End: 2024-12-30
Payer: OTHER GOVERNMENT

## 2024-12-30 DIAGNOSIS — M79.89 SOFT TISSUE MASS: ICD-10-CM

## 2024-12-30 DIAGNOSIS — Z01.411 ENCOUNTER FOR GYNECOLOGICAL EXAMINATION WITH ABNORMAL FINDING: ICD-10-CM

## 2024-12-30 PROCEDURE — 77063 BREAST TOMOSYNTHESIS BI: CPT

## 2024-12-30 PROCEDURE — 73218 MRI UPPER EXTREMITY W/O DYE: CPT

## 2024-12-30 PROCEDURE — 77067 SCR MAMMO BI INCL CAD: CPT

## 2025-01-15 ENCOUNTER — OFFICE VISIT (OUTPATIENT)
Dept: FAMILY MEDICINE CLINIC | Facility: CLINIC | Age: 65
End: 2025-01-15
Payer: OTHER GOVERNMENT

## 2025-01-15 VITALS
DIASTOLIC BLOOD PRESSURE: 70 MMHG | TEMPERATURE: 96.8 F | SYSTOLIC BLOOD PRESSURE: 124 MMHG | BODY MASS INDEX: 34.27 KG/M2 | OXYGEN SATURATION: 98 % | HEIGHT: 66 IN | WEIGHT: 213.2 LBS | HEART RATE: 85 BPM

## 2025-01-15 DIAGNOSIS — F32.0 MAJOR DEPRESSIVE DISORDER, SINGLE EPISODE, MILD: ICD-10-CM

## 2025-01-15 DIAGNOSIS — J10.1 INFLUENZA A: Primary | ICD-10-CM

## 2025-01-15 DIAGNOSIS — F41.9 ANXIETY: ICD-10-CM

## 2025-01-15 DIAGNOSIS — J30.9 ALLERGIC RHINITIS, UNSPECIFIED SEASONALITY, UNSPECIFIED TRIGGER: ICD-10-CM

## 2025-01-15 DIAGNOSIS — R09.89 CHEST CONGESTION: ICD-10-CM

## 2025-01-15 LAB
EXPIRATION DATE: ABNORMAL
FLUAV AG UPPER RESP QL IA.RAPID: DETECTED
FLUBV AG UPPER RESP QL IA.RAPID: NOT DETECTED
INTERNAL CONTROL: ABNORMAL
Lab: ABNORMAL
SARS-COV-2 AG UPPER RESP QL IA.RAPID: NOT DETECTED

## 2025-01-15 PROCEDURE — 99214 OFFICE O/P EST MOD 30 MIN: CPT | Performed by: NURSE PRACTITIONER

## 2025-01-15 PROCEDURE — 87428 SARSCOV & INF VIR A&B AG IA: CPT | Performed by: NURSE PRACTITIONER

## 2025-01-15 RX ORDER — ESCITALOPRAM OXALATE 10 MG/1
10 TABLET ORAL DAILY
Qty: 90 TABLET | Refills: 1 | Status: SHIPPED | OUTPATIENT
Start: 2025-01-15

## 2025-01-15 RX ORDER — LORATADINE 10 MG/1
10 TABLET ORAL DAILY
Qty: 90 TABLET | Refills: 1 | Status: SHIPPED | OUTPATIENT
Start: 2025-01-15

## 2025-01-15 RX ORDER — OSELTAMIVIR PHOSPHATE 75 MG/1
75 CAPSULE ORAL 2 TIMES DAILY
Qty: 10 CAPSULE | Refills: 0 | Status: SHIPPED | OUTPATIENT
Start: 2025-01-15 | End: 2025-01-20

## 2025-01-15 RX ORDER — HYDROXYZINE HYDROCHLORIDE 25 MG/1
25 TABLET, FILM COATED ORAL 3 TIMES DAILY PRN
Qty: 90 TABLET | Refills: 0 | Status: SHIPPED | OUTPATIENT
Start: 2025-01-15

## 2025-01-15 NOTE — PROGRESS NOTES
"Chief Complaint  Cough    Subjective         Judy Ramey presents to [facility]  Patient presents to the office with complaints of general body aches cough and chills.  Patient states symptoms have been present for 2 days.  She also states she has had more fatigue and increased headaches and chest congestion.  She denies any nausea vomiting or diarrhea.    Cough         Objective     Vitals:    01/15/25 1135   BP: 124/70   BP Location: Right arm   Patient Position: Sitting   Cuff Size: Adult   Pulse: 85   Temp: 96.8 °F (36 °C)   TempSrc: Temporal   SpO2: 98%   Weight: 96.7 kg (213 lb 3.2 oz)   Height: 167.6 cm (66\")      Body mass index is 34.41 kg/m².             Physical Exam  Vitals reviewed.   Constitutional:       Appearance: Normal appearance.   Cardiovascular:      Rate and Rhythm: Normal rate and regular rhythm.      Pulses: Normal pulses.      Heart sounds: Normal heart sounds, S1 normal and S2 normal. No murmur heard.  Pulmonary:      Effort: Pulmonary effort is normal. No respiratory distress.      Breath sounds: Normal breath sounds.   Skin:     General: Skin is warm and dry.   Neurological:      Mental Status: She is alert and oriented to person, place, and time.   Psychiatric:         Attention and Perception: Attention normal.         Mood and Affect: Mood normal.         Behavior: Behavior normal.          Result Review :   The following data was reviewed by: FERNY Martin on 01/15/2025:    Procedures    Assessment and Plan   Diagnoses and all orders for this visit:    1. Influenza A (Primary)  -     oseltamivir (Tamiflu) 75 MG capsule; Take 1 capsule by mouth 2 (Two) Times a Day for 5 days.  Dispense: 10 capsule; Refill: 0    2. Chest congestion  -     POCT SARS-CoV-2 Antigen VIPIN + Flu    3. Anxiety  -     escitalopram (LEXAPRO) 10 MG tablet; Take 1 tablet by mouth Daily.  Dispense: 90 tablet; Refill: 1  -     hydrOXYzine (ATARAX) 25 MG tablet; Take 1 " tablet by mouth 3 (Three) Times a Day As Needed for Anxiety.  Dispense: 90 tablet; Refill: 0    4. Major depressive disorder, single episode, mild  -     escitalopram (LEXAPRO) 10 MG tablet; Take 1 tablet by mouth Daily.  Dispense: 90 tablet; Refill: 1    5. Allergic rhinitis, unspecified seasonality, unspecified trigger  -     loratadine (CLARITIN) 10 MG tablet; Take 1 tablet by mouth Daily.  Dispense: 90 tablet; Refill: 1      Patient should rest.  May use Tylenol as needed for general body aches and/or fevers as directed.    Follow Up   Return if symptoms worsen or fail to improve, for Next scheduled follow up.  Patient was given instructions and counseling regarding her condition or for health maintenance advice. Please see specific information pulled into the AVS if appropriate.

## 2025-01-20 ENCOUNTER — OFFICE VISIT (OUTPATIENT)
Dept: PULMONOLOGY | Facility: CLINIC | Age: 65
End: 2025-01-20
Payer: OTHER GOVERNMENT

## 2025-01-20 VITALS
SYSTOLIC BLOOD PRESSURE: 150 MMHG | DIASTOLIC BLOOD PRESSURE: 87 MMHG | TEMPERATURE: 98 F | WEIGHT: 217 LBS | HEART RATE: 67 BPM | HEIGHT: 66 IN | BODY MASS INDEX: 34.87 KG/M2 | OXYGEN SATURATION: 99 % | RESPIRATION RATE: 16 BRPM

## 2025-01-20 DIAGNOSIS — J45.30 MILD PERSISTENT ASTHMA, UNSPECIFIED WHETHER COMPLICATED: Primary | ICD-10-CM

## 2025-01-20 DIAGNOSIS — J34.3 HYPERTROPHY OF NASAL TURBINATES: ICD-10-CM

## 2025-01-20 DIAGNOSIS — R76.8 ELEVATED IGE LEVEL: ICD-10-CM

## 2025-01-20 DIAGNOSIS — J10.1 INFLUENZA A: ICD-10-CM

## 2025-01-20 PROCEDURE — 99213 OFFICE O/P EST LOW 20 MIN: CPT | Performed by: INTERNAL MEDICINE

## 2025-01-20 NOTE — PROGRESS NOTES
"Chief Complaint  Follow-up (4 month follow up), Asthma, Nasal Obstruction, and Cough    Subjective        Judy Ramey presents to [facility]  History of Present Illness  History of Present Illness  The patient presents for evaluation of asthma.    She reports experiencing respiratory distress and a persistent cough, which she attributes to the current weather conditions. She has been diagnosed with influenza, confirmed by a positive test result 5 days ago. Despite the diagnosis, she reports minimal symptoms, including fatigue and body aches, but no significant cough. She began experiencing these symptoms the day prior to her influenza test. She was prescribed medication for her influenza, which she believes has been effective in preventing severe illness. She did not receive an influenza vaccine this year. She is requesting additional attachments for her nebulizer. She finds relief from these symptoms by covering her nose with a scarf to avoid inhaling cold air. She believes that her current medication, Dupixent, is beneficial in managing her symptoms.    FAMILY HISTORY  Her mother has congestive heart failure and stage 3 kidney disease.    MEDICATIONS  Dupixent    IMMUNIZATIONS  She did not receive the influenza vaccine this year.    Objective   Vital Signs:  /87 (BP Location: Left arm, Patient Position: Sitting, Cuff Size: Adult)   Pulse 67   Temp 98 °F (36.7 °C) (Temporal)   Resp 16   Ht 167.6 cm (66\")   Wt 98.4 kg (217 lb)   SpO2 99% Comment: room air  BMI 35.02 kg/m²   Estimated body mass index is 35.02 kg/m² as calculated from the following:    Height as of this encounter: 167.6 cm (66\").    Weight as of this encounter: 98.4 kg (217 lb).            Physical Exam   Physical Exam  Lungs are clear. No wheezing detected.    Result Review :         Results  Laboratory Studies  Tested positive for influenza 5 days ago.              Assessment " and Plan   Diagnoses and all orders for this visit:    1. Mild persistent asthma, unspecified whether complicated (Primary)    2. Elevated IgE level    3. Hypertrophy of nasal turbinates    4. Influenza A      Assessment & Plan  1. Asthma.  Her asthma symptoms are exacerbated by cold weather, leading to bronchospasm. She reports that Dupixent is still helping her manage her symptoms. She is advised to continue using Dupixent as prescribed. Additional attachments for her nebulizer will be provided to ensure she has the necessary equipment to manage her symptoms at home.    2. Influenza.  She tested positive for influenza 5 days ago but experienced minimal symptoms, likely due to her use of Dupixent and overall good health. She did not receive the influenza vaccine this year.    Follow-up  The patient will follow up in 6 months.         Follow Up   Return in about 6 months (around 7/20/2025).  Patient was given instructions and counseling regarding her condition or for health maintenance advice. Please see specific information pulled into the AVS if appropriate.         Patient or patient representative verbalized consent for the use of Ambient Listening during the visit with  Hank Vee DO for chart documentation. 1/20/2025  10:47 EST       There are no Wet Read(s) to document.

## 2025-01-22 ENCOUNTER — OFFICE VISIT (OUTPATIENT)
Dept: FAMILY MEDICINE CLINIC | Facility: CLINIC | Age: 65
End: 2025-01-22
Payer: OTHER GOVERNMENT

## 2025-01-22 VITALS
HEART RATE: 57 BPM | OXYGEN SATURATION: 99 % | WEIGHT: 211.2 LBS | SYSTOLIC BLOOD PRESSURE: 126 MMHG | BODY MASS INDEX: 33.94 KG/M2 | TEMPERATURE: 97.8 F | DIASTOLIC BLOOD PRESSURE: 72 MMHG | HEIGHT: 66 IN

## 2025-01-22 DIAGNOSIS — J10.1 INFLUENZA A: ICD-10-CM

## 2025-01-22 DIAGNOSIS — R30.0 DYSURIA: Primary | ICD-10-CM

## 2025-01-22 LAB
BILIRUB BLD-MCNC: NEGATIVE MG/DL
CLARITY, POC: CLEAR
COLOR UR: ABNORMAL
EXPIRATION DATE: ABNORMAL
GLUCOSE UR STRIP-MCNC: NEGATIVE MG/DL
KETONES UR QL: NEGATIVE
LEUKOCYTE EST, POC: NEGATIVE
Lab: ABNORMAL
NITRITE UR-MCNC: NEGATIVE MG/ML
PH UR: 6 [PH] (ref 5–8)
PROT UR STRIP-MCNC: NEGATIVE MG/DL
RBC # UR STRIP: ABNORMAL /UL
SP GR UR: 1.02 (ref 1–1.03)
UROBILINOGEN UR QL: NORMAL

## 2025-01-22 PROCEDURE — 99213 OFFICE O/P EST LOW 20 MIN: CPT | Performed by: NURSE PRACTITIONER

## 2025-01-22 PROCEDURE — 81003 URINALYSIS AUTO W/O SCOPE: CPT | Performed by: NURSE PRACTITIONER

## 2025-01-22 RX ORDER — PHENAZOPYRIDINE HYDROCHLORIDE 100 MG/1
100 TABLET, FILM COATED ORAL 3 TIMES DAILY PRN
Qty: 30 TABLET | Refills: 0 | Status: SHIPPED | OUTPATIENT
Start: 2025-01-22

## 2025-01-22 NOTE — PROGRESS NOTES
"Chief Complaint  Fever    Subjective         Judy Ramey presents to [facility]  Patient presents to the office with continued general body aches, fevers and dysuria.  She states that she has been having chills as well.  Patient does state that she is still taking the Tamiflu for the flu.  I did explain that I would check her for COVID to ensure that she did not get a secondary infection.  She denies any cough or shortness of breath.  She denies any nausea vomiting or diarrhea.    Fever          Objective     Vitals:    01/22/25 1341   BP: 126/72   BP Location: Right arm   Patient Position: Sitting   Cuff Size: Adult   Pulse: 57   Temp: 97.8 °F (36.6 °C)   TempSrc: Temporal   SpO2: 99%   Weight: 95.8 kg (211 lb 3.2 oz)   Height: 167.6 cm (66\")      Body mass index is 34.09 kg/m².             Physical Exam  Vitals reviewed.   Constitutional:       Appearance: Normal appearance.   Cardiovascular:      Rate and Rhythm: Normal rate and regular rhythm.      Pulses: Normal pulses.      Heart sounds: Normal heart sounds, S1 normal and S2 normal. No murmur heard.  Pulmonary:      Effort: Pulmonary effort is normal. No respiratory distress.      Breath sounds: Normal breath sounds.   Skin:     General: Skin is warm and dry.   Neurological:      Mental Status: She is alert and oriented to person, place, and time.   Psychiatric:         Attention and Perception: Attention normal.         Mood and Affect: Mood normal.         Behavior: Behavior normal.          Result Review :   The following data was reviewed by: FERNY Martin on 01/22/2025:      Procedures    Assessment and Plan   Diagnoses and all orders for this visit:    1. Dysuria (Primary)  -     POCT urinalysis dipstick, automated  -     phenazopyridine (Pyridium) 100 MG tablet; Take 1 tablet by mouth 3 (Three) Times a Day As Needed for Bladder Spasms.  Dispense: 30 tablet; Refill: 0  -     Urine Culture - Urine, Urine, Clean " Catch; Future    2. Influenza A      Rest increase fluids.  May use Tylenol as needed for fever general body aches as directed    Follow Up   Return if symptoms worsen or fail to improve.  Patient was given instructions and counseling regarding her condition or for health maintenance advice. Please see specific information pulled into the AVS if appropriate.

## 2025-01-27 RX ORDER — NITROFURANTOIN 25; 75 MG/1; MG/1
100 CAPSULE ORAL 2 TIMES DAILY
Qty: 10 CAPSULE | Refills: 0 | Status: SHIPPED | OUTPATIENT
Start: 2025-01-27 | End: 2025-02-01

## 2025-03-19 ENCOUNTER — TELEPHONE (OUTPATIENT)
Dept: FAMILY MEDICINE CLINIC | Facility: CLINIC | Age: 65
End: 2025-03-19
Payer: OTHER GOVERNMENT

## 2025-04-01 DIAGNOSIS — F41.9 ANXIETY: ICD-10-CM

## 2025-04-01 DIAGNOSIS — E03.9 HYPOTHYROIDISM, UNSPECIFIED TYPE: ICD-10-CM

## 2025-04-01 RX ORDER — LEVOTHYROXINE SODIUM 125 MCG
125 TABLET ORAL DAILY
Qty: 90 TABLET | Refills: 1 | Status: SHIPPED | OUTPATIENT
Start: 2025-04-01

## 2025-04-01 RX ORDER — HYDROXYZINE HYDROCHLORIDE 25 MG/1
25 TABLET, FILM COATED ORAL 3 TIMES DAILY PRN
Qty: 90 TABLET | Refills: 0 | Status: SHIPPED | OUTPATIENT
Start: 2025-04-01

## 2025-04-01 NOTE — TELEPHONE ENCOUNTER
Caller: Judy Ramey    Relationship: Self    Best call back number:   Telephone Information:   Mobile [phone]        Requested Prescriptions:   Requested Prescriptions     Pending Prescriptions Disp Refills    hydrOXYzine (ATARAX) 25 MG tablet 90 tablet 0     Sig: Take 1 tablet by mouth 3 (Three) Times a Day As Needed for Anxiety.    Synthroid 125 MCG tablet 90 tablet 1     Sig: Take 1 tablet by mouth Daily.        Pharmacy where request should be sent: [place] [phone] [facility] [phone] FX     Last office visit with prescribing clinician: 1/22/2025   Last telemedicine visit with prescribing clinician: Visit date not found   Next office visit with prescribing clinician: Visit date not found       Does the patient have less than a 3 day supply:  [x] Yes  [] No        Tatyana Alejandro Rep   04/01/25 08:39 EDT

## 2025-04-09 DIAGNOSIS — M54.41 CHRONIC BILATERAL LOW BACK PAIN WITH SCIATICA, SCIATICA LATERALITY UNSPECIFIED: ICD-10-CM

## 2025-04-09 DIAGNOSIS — M54.2 NECK PAIN: ICD-10-CM

## 2025-04-09 DIAGNOSIS — M54.42 CHRONIC BILATERAL LOW BACK PAIN WITH SCIATICA, SCIATICA LATERALITY UNSPECIFIED: ICD-10-CM

## 2025-04-09 DIAGNOSIS — G89.29 CHRONIC BILATERAL LOW BACK PAIN WITH SCIATICA, SCIATICA LATERALITY UNSPECIFIED: ICD-10-CM

## 2025-04-09 RX ORDER — MELOXICAM 15 MG/1
15 TABLET ORAL DAILY
Qty: 90 TABLET | Refills: 0 | Status: SHIPPED | OUTPATIENT
Start: 2025-04-09

## 2025-04-14 ENCOUNTER — TELEPHONE (OUTPATIENT)
Dept: PULMONOLOGY | Facility: CLINIC | Age: 65
End: 2025-04-14
Payer: OTHER GOVERNMENT

## 2025-04-14 DIAGNOSIS — J45.50 SEVERE PERSISTENT ASTHMA WITHOUT COMPLICATION: Chronic | ICD-10-CM

## 2025-04-14 RX ORDER — DUPILUMAB 200 MG/1.14ML
200 INJECTION, SOLUTION SUBCUTANEOUS
Qty: 6.84 ML | Refills: 11 | Status: SHIPPED | OUTPATIENT
Start: 2025-04-14

## 2025-04-14 NOTE — TELEPHONE ENCOUNTER
REFILL  Caller: Judy Ramey   Relationship: SELF   Best call back number: [phone]   Requested Prescriptions: Dupilumab (Dupixent) 200 MG/1.14ML solution prefilled syringe   Pharmacy where request should be sent: [facility] - [place], [place] - [address] [phone]  - [phone]   [address] [facility] [address] [address]  Phone: [phone]  Fax: [phone]   Additional details provided by patient: CAN YOU SEND 2 BOXES AT ONCE. ITS EASIER FOR ME TO GET THEM.   Does the patient have less than a 3 day supply:  [X ] Yes  [ ] No  Would you like a call back once the refill request has been completed: [ X] Yes [ ] No  If the office needs to give you a call back, can they leave a voicemail: [X ] Yes [ ] No

## 2025-05-19 ENCOUNTER — HOSPITAL ENCOUNTER (OUTPATIENT)
Dept: GENERAL RADIOLOGY | Facility: HOSPITAL | Age: 65
Discharge: HOME | End: 2025-05-19
Admitting: NURSE PRACTITIONER
Payer: OTHER GOVERNMENT

## 2025-05-19 ENCOUNTER — OFFICE VISIT (OUTPATIENT)
Dept: FAMILY MEDICINE CLINIC | Facility: CLINIC | Age: 65
End: 2025-05-19
Payer: OTHER GOVERNMENT

## 2025-05-19 VITALS
DIASTOLIC BLOOD PRESSURE: 82 MMHG | BODY MASS INDEX: 33.91 KG/M2 | WEIGHT: 211 LBS | TEMPERATURE: 96 F | SYSTOLIC BLOOD PRESSURE: 130 MMHG | OXYGEN SATURATION: 97 % | HEART RATE: 88 BPM | HEIGHT: 66 IN

## 2025-05-19 DIAGNOSIS — F32.0 MAJOR DEPRESSIVE DISORDER, SINGLE EPISODE, MILD: ICD-10-CM

## 2025-05-19 DIAGNOSIS — Z13.6 SCREENING FOR CARDIOVASCULAR CONDITION: ICD-10-CM

## 2025-05-19 DIAGNOSIS — F41.9 ANXIETY: ICD-10-CM

## 2025-05-19 DIAGNOSIS — N39.3 STRESS INCONTINENCE: ICD-10-CM

## 2025-05-19 DIAGNOSIS — R05.1 ACUTE COUGH: ICD-10-CM

## 2025-05-19 DIAGNOSIS — J30.9 ALLERGIC RHINITIS, UNSPECIFIED SEASONALITY, UNSPECIFIED TRIGGER: ICD-10-CM

## 2025-05-19 DIAGNOSIS — R00.2 PALPITATION: ICD-10-CM

## 2025-05-19 DIAGNOSIS — H04.123 DRY EYES: ICD-10-CM

## 2025-05-19 DIAGNOSIS — J45.50 SEVERE PERSISTENT ASTHMA WITHOUT COMPLICATION: ICD-10-CM

## 2025-05-19 DIAGNOSIS — E03.9 HYPOTHYROIDISM, UNSPECIFIED TYPE: Primary | ICD-10-CM

## 2025-05-19 DIAGNOSIS — G89.29 CHRONIC RIGHT-SIDED LOW BACK PAIN WITH RIGHT-SIDED SCIATICA: ICD-10-CM

## 2025-05-19 DIAGNOSIS — M54.2 NECK PAIN: ICD-10-CM

## 2025-05-19 DIAGNOSIS — M54.41 CHRONIC RIGHT-SIDED LOW BACK PAIN WITH RIGHT-SIDED SCIATICA: ICD-10-CM

## 2025-05-19 PROCEDURE — 99214 OFFICE O/P EST MOD 30 MIN: CPT | Performed by: NURSE PRACTITIONER

## 2025-05-19 PROCEDURE — 71046 X-RAY EXAM CHEST 2 VIEWS: CPT

## 2025-05-19 PROCEDURE — 93000 ELECTROCARDIOGRAM COMPLETE: CPT | Performed by: NURSE PRACTITIONER

## 2025-05-19 RX ORDER — PREDNISONE 20 MG/1
40 TABLET ORAL DAILY
Qty: 10 TABLET | Refills: 0 | Status: SHIPPED | OUTPATIENT
Start: 2025-05-19 | End: 2025-05-24

## 2025-05-19 RX ORDER — ALBUTEROL SULFATE 90 UG/1
2 INHALANT RESPIRATORY (INHALATION) EVERY 4 HOURS PRN
Qty: 54 G | Refills: 4 | Status: SHIPPED | OUTPATIENT
Start: 2025-05-19

## 2025-05-19 RX ORDER — HYDROXYZINE HYDROCHLORIDE 25 MG/1
25 TABLET, FILM COATED ORAL 3 TIMES DAILY PRN
Qty: 90 TABLET | Refills: 0 | Status: SHIPPED | OUTPATIENT
Start: 2025-05-19

## 2025-05-19 RX ORDER — LORATADINE 10 MG/1
10 TABLET ORAL DAILY
Qty: 90 TABLET | Refills: 1 | Status: SHIPPED | OUTPATIENT
Start: 2025-05-19

## 2025-05-19 RX ORDER — CARBOXYMETHYLCELLULOSE SODIUM 0.5 G/100ML
2 SOLUTION/ DROPS OPHTHALMIC DAILY PRN
Qty: 70 EACH | Refills: 1 | Status: SHIPPED | OUTPATIENT
Start: 2025-05-19

## 2025-05-19 RX ORDER — TOLTERODINE 2 MG/1
2 CAPSULE, EXTENDED RELEASE ORAL DAILY
Qty: 90 CAPSULE | Refills: 1 | Status: SHIPPED | OUTPATIENT
Start: 2025-05-19

## 2025-05-19 RX ORDER — DICLOFENAC SODIUM 75 MG/1
75 TABLET, DELAYED RELEASE ORAL 2 TIMES DAILY
Qty: 180 TABLET | Refills: 0 | Status: SHIPPED | OUTPATIENT
Start: 2025-05-19

## 2025-05-19 RX ORDER — AZELASTINE 1 MG/ML
2 SPRAY, METERED NASAL 2 TIMES DAILY
Qty: 90 ML | Refills: 4 | Status: SHIPPED | OUTPATIENT
Start: 2025-05-19

## 2025-05-19 RX ORDER — SOD CHLOR,BICARB/SQUEEZ BOTTLE
1 PACKET, WITH RINSE DEVICE NASAL 2 TIMES DAILY PRN
Qty: 100 EACH | Refills: 1 | Status: SHIPPED | OUTPATIENT
Start: 2025-05-19

## 2025-05-19 RX ORDER — ESCITALOPRAM OXALATE 10 MG/1
10 TABLET ORAL DAILY
Qty: 90 TABLET | Refills: 1 | Status: SHIPPED | OUTPATIENT
Start: 2025-05-19

## 2025-05-19 RX ORDER — MONTELUKAST SODIUM 10 MG/1
10 TABLET ORAL NIGHTLY
Qty: 90 TABLET | Refills: 1 | Status: SHIPPED | OUTPATIENT
Start: 2025-05-19

## 2025-05-19 NOTE — PROGRESS NOTES
"Chief Complaint  Shortness of Breath    Subjective         Judy Ramey presents to [facility]  Patient presents today for dry cough x 2 weeks. Denies congestion or fever.   She also reports that she has been having intermittent episodes of palpitations. Nothing seems to relieve or exacerbate this.  She reports chronic neck and bilateral shoulder pain    Shortness of Breath       Objective     Vitals:    05/19/25 1315   BP: 130/82   BP Location: Right arm   Patient Position: Sitting   Cuff Size: Adult   Pulse: 88   Temp: 96 °F (35.6 °C)   TempSrc: Temporal   SpO2: 97%   Weight: 95.7 kg (211 lb)   Height: 167.6 cm (66\")      Body mass index is 34.06 kg/m².             Physical Exam  Vitals reviewed.   Constitutional:       Appearance: Normal appearance.   HENT:      Head:        Comments: Dry skin  Cardiovascular:      Rate and Rhythm: Normal rate. Rhythm irregular.      Pulses: Normal pulses.      Heart sounds: Normal heart sounds, S1 normal and S2 normal. No murmur heard.  Pulmonary:      Effort: Pulmonary effort is normal. No respiratory distress.      Breath sounds: Normal breath sounds.   Musculoskeletal:      Cervical back: Tenderness present.   Skin:     General: Skin is warm and dry.   Neurological:      Mental Status: She is alert and oriented to person, place, and time.   Psychiatric:         Attention and Perception: Attention normal.         Mood and Affect: Mood normal.         Behavior: Behavior normal.          Result Review :   The following data was reviewed by: FERNY Martin on 05/19/2025:        ECG 12 Lead    Date/Time: 5/19/2025 2:29 PM  Performed by: Milton Zamarripa APRN    Authorized by: Milton Zamarripa APRN  Rhythm: sinus rhythm  Ectopy: atrial premature contractions  Rate: bradycardic  QRS axis: normal    Clinical impression: normal ECG          Assessment and Plan   Diagnoses and all orders for this visit:    1. Hypothyroidism, unspecified type " (Primary)  -     CBC (No Diff); Future  -     Comprehensive Metabolic Panel; Future  -     TSH; Future  -     T4, Free; Future    2. Severe persistent asthma without complication  -     albuterol sulfate HFA (ProAir HFA) 108 (90 Base) MCG/ACT inhaler; Inhale 2 puffs Every 4 (Four) Hours As Needed for Wheezing.  Dispense: 54 g; Refill: 4  -     CBC (No Diff); Future  -     Comprehensive Metabolic Panel; Future  -     predniSONE (DELTASONE) 20 MG tablet; Take 2 tablets by mouth Daily for 5 days.  Dispense: 10 tablet; Refill: 0    3. Chronic right-sided low back pain with right-sided sciatica  -     amitriptyline (ELAVIL) 25 MG tablet; Take 1 tablet by mouth At Night As Needed for Sleep.  Dispense: 90 tablet; Refill: 0  -     diclofenac (VOLTAREN) 75 MG EC tablet; Take 1 tablet by mouth 2 (Two) Times a Day.  Dispense: 180 tablet; Refill: 0    4. Anxiety  -     escitalopram (LEXAPRO) 10 MG tablet; Take 1 tablet by mouth Daily.  Dispense: 90 tablet; Refill: 1  -     hydrOXYzine (ATARAX) 25 MG tablet; Take 1 tablet by mouth 3 (Three) Times a Day As Needed for Anxiety.  Dispense: 90 tablet; Refill: 0    5. Major depressive disorder, single episode, mild  -     escitalopram (LEXAPRO) 10 MG tablet; Take 1 tablet by mouth Daily.  Dispense: 90 tablet; Refill: 1    6. Allergic rhinitis, unspecified seasonality, unspecified trigger  -     loratadine (CLARITIN) 10 MG tablet; Take 1 tablet by mouth Daily.  Dispense: 90 tablet; Refill: 1  -     montelukast (SINGULAIR) 10 MG tablet; Take 1 tablet by mouth Every Night.  Dispense: 90 tablet; Refill: 1  -     azelastine (ASTELIN) 0.1 % nasal spray; Administer 2 sprays into the nostril(s) as directed by provider 2 (Two) Times a Day. Use in each nostril as directed  Dispense: 90 mL; Refill: 4  -     AYR Saline Nasal Rinse 1.57 g pack; Administer 1 each into the nostril(s) as directed by provider 2 (Two) Times a Day As Needed (congestion).  Dispense: 100 each; Refill: 1    7. Dry eyes  -      Lubricating Plus Eye Drops 0.5 % solution; Administer 2 drops to both eyes Daily As Needed for Dry Eyes.  Dispense: 70 each; Refill: 1    8. Stress incontinence  -     tolterodine LA (DETROL LA) 2 MG 24 hr capsule; Take 1 capsule by mouth Daily.  Dispense: 90 capsule; Refill: 1    9. Screening for cardiovascular condition  -     Lipid Panel; Future    10. Acute cough  -     XR Chest 2 View; Future  -     predniSONE (DELTASONE) 20 MG tablet; Take 2 tablets by mouth Daily for 5 days.  Dispense: 10 tablet; Refill: 0    11. Neck pain  -     diclofenac (VOLTAREN) 75 MG EC tablet; Take 1 tablet by mouth 2 (Two) Times a Day.  Dispense: 180 tablet; Refill: 0    12. Palpitation  -     ECG 12 Lead          Follow Up   Return for Next scheduled follow up.  Patient was given instructions and counseling regarding her condition or for health maintenance advice. Please see specific information pulled into the AVS if appropriate.

## 2025-05-20 ENCOUNTER — LAB (OUTPATIENT)
Dept: LAB | Facility: HOSPITAL | Age: 65
End: 2025-05-20
Payer: OTHER GOVERNMENT

## 2025-05-20 DIAGNOSIS — J45.50 SEVERE PERSISTENT ASTHMA WITHOUT COMPLICATION: ICD-10-CM

## 2025-05-20 DIAGNOSIS — Z13.6 SCREENING FOR CARDIOVASCULAR CONDITION: ICD-10-CM

## 2025-05-20 DIAGNOSIS — E03.9 HYPOTHYROIDISM, UNSPECIFIED TYPE: ICD-10-CM

## 2025-05-20 LAB
ALBUMIN SERPL-MCNC: 4.4 G/DL (ref 3.5–5.2)
ALBUMIN/GLOB SERPL: 1.8 G/DL
ALP SERPL-CCNC: 113 U/L (ref 39–117)
ALT SERPL W P-5'-P-CCNC: 17 U/L (ref 1–33)
ANION GAP SERPL CALCULATED.3IONS-SCNC: 11.2 MMOL/L (ref 5–15)
AST SERPL-CCNC: 19 U/L (ref 1–32)
BILIRUB SERPL-MCNC: 0.4 MG/DL (ref 0–1.2)
BUN SERPL-MCNC: 13 MG/DL (ref 8–23)
BUN/CREAT SERPL: 11.5 (ref 7–25)
CALCIUM SPEC-SCNC: 9.4 MG/DL (ref 8.6–10.5)
CHLORIDE SERPL-SCNC: 104 MMOL/L (ref 98–107)
CHOLEST SERPL-MCNC: 201 MG/DL (ref 0–200)
CO2 SERPL-SCNC: 27.8 MMOL/L (ref 22–29)
CREAT SERPL-MCNC: 1.13 MG/DL (ref 0.57–1)
DEPRECATED RDW RBC AUTO: 42.4 FL (ref 37–54)
EGFRCR SERPLBLD CKD-EPI 2021: 54.4 ML/MIN/1.73
ERYTHROCYTE [DISTWIDTH] IN BLOOD BY AUTOMATED COUNT: 13.1 % (ref 12.3–15.4)
GLOBULIN UR ELPH-MCNC: 2.4 GM/DL
GLUCOSE SERPL-MCNC: 98 MG/DL (ref 65–99)
HCT VFR BLD AUTO: 39.5 % (ref 34–46.6)
HDLC SERPL-MCNC: 57 MG/DL (ref 40–60)
HGB BLD-MCNC: 12.8 G/DL (ref 12–15.9)
LDLC SERPL CALC-MCNC: 127 MG/DL (ref 0–100)
LDLC/HDLC SERPL: 2.19 {RATIO}
MCH RBC QN AUTO: 28.6 PG (ref 26.6–33)
MCHC RBC AUTO-ENTMCNC: 32.4 G/DL (ref 31.5–35.7)
MCV RBC AUTO: 88.2 FL (ref 79–97)
PLATELET # BLD AUTO: 181 10*3/MM3 (ref 140–450)
PMV BLD AUTO: 10.9 FL (ref 6–12)
POTASSIUM SERPL-SCNC: 4.7 MMOL/L (ref 3.5–5.2)
PROT SERPL-MCNC: 6.8 G/DL (ref 6–8.5)
RBC # BLD AUTO: 4.48 10*6/MM3 (ref 3.77–5.28)
SODIUM SERPL-SCNC: 143 MMOL/L (ref 136–145)
T4 FREE SERPL-MCNC: 1.59 NG/DL (ref 0.92–1.68)
TRIGL SERPL-MCNC: 97 MG/DL (ref 0–150)
TSH SERPL DL<=0.05 MIU/L-ACNC: 0.96 UIU/ML (ref 0.27–4.2)
VLDLC SERPL-MCNC: 17 MG/DL (ref 5–40)
WBC NRBC COR # BLD AUTO: 4.27 10*3/MM3 (ref 3.4–10.8)

## 2025-05-20 PROCEDURE — 36415 COLL VENOUS BLD VENIPUNCTURE: CPT

## 2025-05-20 PROCEDURE — 80053 COMPREHEN METABOLIC PANEL: CPT

## 2025-05-20 PROCEDURE — 85027 COMPLETE CBC AUTOMATED: CPT

## 2025-05-20 PROCEDURE — 84439 ASSAY OF FREE THYROXINE: CPT

## 2025-05-20 PROCEDURE — 84443 ASSAY THYROID STIM HORMONE: CPT

## 2025-05-20 PROCEDURE — 80061 LIPID PANEL: CPT

## 2025-05-24 DIAGNOSIS — M54.50 ACUTE LOW BACK PAIN WITHOUT SCIATICA, UNSPECIFIED BACK PAIN LATERALITY: ICD-10-CM

## 2025-05-24 RX ORDER — TRAMADOL HYDROCHLORIDE 50 MG/1
50 TABLET ORAL EVERY 6 HOURS PRN
Qty: 30 TABLET | Refills: 0 | Status: SHIPPED | OUTPATIENT
Start: 2025-05-24

## 2025-05-27 DIAGNOSIS — J30.9 ALLERGIC RHINITIS, UNSPECIFIED SEASONALITY, UNSPECIFIED TRIGGER: ICD-10-CM

## 2025-05-27 DIAGNOSIS — J45.50 SEVERE PERSISTENT ASTHMA WITHOUT COMPLICATION: ICD-10-CM

## 2025-05-27 RX ORDER — AZELASTINE HYDROCHLORIDE, FLUTICASONE PROPIONATE 137; 50 UG/1; UG/1
1 SPRAY, METERED NASAL DAILY
Qty: 23 G | Refills: 2 | Status: SHIPPED | OUTPATIENT
Start: 2025-05-27

## 2025-05-27 RX ORDER — AZELASTINE 1 MG/ML
2 SPRAY, METERED NASAL 2 TIMES DAILY
Qty: 90 ML | Refills: 4 | Status: CANCELLED | OUTPATIENT
Start: 2025-05-27

## 2025-05-27 RX ORDER — FLUTICASONE PROPIONATE AND SALMETEROL XINAFOATE 230; 21 UG/1; UG/1
2 AEROSOL, METERED RESPIRATORY (INHALATION) 2 TIMES DAILY
Qty: 36 G | Refills: 3 | Status: SHIPPED | OUTPATIENT
Start: 2025-05-27

## 2025-05-27 NOTE — TELEPHONE ENCOUNTER
Medication pended. Patient also requesting dymista nasal spray or I believe flonase nasal spray. Said they worked really well for her.

## 2025-06-23 ENCOUNTER — TELEPHONE (OUTPATIENT)
Dept: FAMILY MEDICINE CLINIC | Facility: CLINIC | Age: 65
End: 2025-06-23
Payer: OTHER GOVERNMENT

## 2025-06-23 DIAGNOSIS — M25.522 LEFT ELBOW PAIN: ICD-10-CM

## 2025-06-23 DIAGNOSIS — M54.2 NECK PAIN: ICD-10-CM

## 2025-06-23 RX ORDER — CYCLOBENZAPRINE HCL 10 MG
10 TABLET ORAL 3 TIMES DAILY PRN
Qty: 60 TABLET | Refills: 0 | Status: SHIPPED | OUTPATIENT
Start: 2025-06-23

## 2025-06-23 NOTE — TELEPHONE ENCOUNTER
Patient has pulled a muscle in her upper. Patient was requesting an appointment to be seen, no appointments available. Patient requesting to know if provider can prescribe a muscle relaxer.     Preferred pharmacy -

## 2025-06-23 NOTE — TELEPHONE ENCOUNTER
Muscle relaxer has been sent to [facility]     Called and spoke with Judy, informed her per Milton, Muscle relaxer has been sent to [facility]. Judy verbalized understanding

## 2025-07-28 ENCOUNTER — TELEPHONE (OUTPATIENT)
Dept: OTOLARYNGOLOGY | Facility: CLINIC | Age: 65
End: 2025-07-28
Payer: OTHER GOVERNMENT

## 2025-07-28 ENCOUNTER — LAB (OUTPATIENT)
Dept: LAB | Facility: HOSPITAL | Age: 65
End: 2025-07-28
Payer: OTHER GOVERNMENT

## 2025-07-28 ENCOUNTER — OFFICE VISIT (OUTPATIENT)
Dept: FAMILY MEDICINE CLINIC | Facility: CLINIC | Age: 65
End: 2025-07-28
Payer: OTHER GOVERNMENT

## 2025-07-28 VITALS
BODY MASS INDEX: 34.2 KG/M2 | OXYGEN SATURATION: 96 % | WEIGHT: 212.8 LBS | HEIGHT: 66 IN | SYSTOLIC BLOOD PRESSURE: 118 MMHG | DIASTOLIC BLOOD PRESSURE: 72 MMHG | HEART RATE: 67 BPM | TEMPERATURE: 98.6 F

## 2025-07-28 DIAGNOSIS — L29.9 PRURITUS: ICD-10-CM

## 2025-07-28 DIAGNOSIS — R53.83 FATIGUE, UNSPECIFIED TYPE: ICD-10-CM

## 2025-07-28 DIAGNOSIS — R53.83 FATIGUE, UNSPECIFIED TYPE: Primary | ICD-10-CM

## 2025-07-28 LAB
ALBUMIN SERPL-MCNC: 4.2 G/DL (ref 3.5–5.2)
ALBUMIN/GLOB SERPL: 1.8 G/DL
ALP SERPL-CCNC: 113 U/L (ref 39–117)
ALT SERPL W P-5'-P-CCNC: 16 U/L (ref 1–33)
ANION GAP SERPL CALCULATED.3IONS-SCNC: 9.9 MMOL/L (ref 5–15)
AST SERPL-CCNC: 16 U/L (ref 1–32)
BILIRUB SERPL-MCNC: 0.4 MG/DL (ref 0–1.2)
BUN SERPL-MCNC: 10 MG/DL (ref 8–23)
BUN/CREAT SERPL: 11.5 (ref 7–25)
CALCIUM SPEC-SCNC: 9.3 MG/DL (ref 8.6–10.5)
CHLORIDE SERPL-SCNC: 103 MMOL/L (ref 98–107)
CO2 SERPL-SCNC: 27.1 MMOL/L (ref 22–29)
CREAT SERPL-MCNC: 0.87 MG/DL (ref 0.57–1)
DEPRECATED RDW RBC AUTO: 42.4 FL (ref 37–54)
EGFRCR SERPLBLD CKD-EPI 2021: 74.5 ML/MIN/1.73
ERYTHROCYTE [DISTWIDTH] IN BLOOD BY AUTOMATED COUNT: 13 % (ref 12.3–15.4)
GLOBULIN UR ELPH-MCNC: 2.4 GM/DL
GLUCOSE SERPL-MCNC: 91 MG/DL (ref 65–99)
HCT VFR BLD AUTO: 38.7 % (ref 34–46.6)
HGB BLD-MCNC: 12.4 G/DL (ref 12–15.9)
IRON 24H UR-MRATE: 97 MCG/DL (ref 37–145)
IRON SATN MFR SERPL: 27 % (ref 20–50)
MAGNESIUM SERPL-MCNC: 2 MG/DL (ref 1.6–2.4)
MCH RBC QN AUTO: 28.6 PG (ref 26.6–33)
MCHC RBC AUTO-ENTMCNC: 32 G/DL (ref 31.5–35.7)
MCV RBC AUTO: 89.2 FL (ref 79–97)
PLATELET # BLD AUTO: 186 10*3/MM3 (ref 140–450)
PMV BLD AUTO: 11.1 FL (ref 6–12)
POTASSIUM SERPL-SCNC: 4.8 MMOL/L (ref 3.5–5.2)
PROT SERPL-MCNC: 6.6 G/DL (ref 6–8.5)
RBC # BLD AUTO: 4.34 10*6/MM3 (ref 3.77–5.28)
SODIUM SERPL-SCNC: 140 MMOL/L (ref 136–145)
TIBC SERPL-MCNC: 361 MCG/DL (ref 298–536)
TRANSFERRIN SERPL-MCNC: 242 MG/DL (ref 200–360)
TSH SERPL DL<=0.05 MIU/L-ACNC: 3.59 UIU/ML (ref 0.27–4.2)
VIT B12 BLD-MCNC: 724 PG/ML (ref 211–946)
WBC NRBC COR # BLD AUTO: 4.4 10*3/MM3 (ref 3.4–10.8)

## 2025-07-28 PROCEDURE — 36415 COLL VENOUS BLD VENIPUNCTURE: CPT

## 2025-07-28 PROCEDURE — 82607 VITAMIN B-12: CPT

## 2025-07-28 PROCEDURE — 85027 COMPLETE CBC AUTOMATED: CPT

## 2025-07-28 PROCEDURE — 83735 ASSAY OF MAGNESIUM: CPT

## 2025-07-28 PROCEDURE — 84466 ASSAY OF TRANSFERRIN: CPT

## 2025-07-28 PROCEDURE — 83540 ASSAY OF IRON: CPT

## 2025-07-28 PROCEDURE — 84443 ASSAY THYROID STIM HORMONE: CPT

## 2025-07-28 PROCEDURE — 80053 COMPREHEN METABOLIC PANEL: CPT

## 2025-07-28 PROCEDURE — 99213 OFFICE O/P EST LOW 20 MIN: CPT | Performed by: NURSE PRACTITIONER

## 2025-07-28 NOTE — TELEPHONE ENCOUNTER
HUB TO RELAY: Left 2 messages with patient letting her know we need updated  referral before her appointment on 7/31/25.

## 2025-07-28 NOTE — PROGRESS NOTES
"Chief Complaint  Itching    Subjective         Judy Ramey presents to [facility]  Patient presents to the office with complaints of itching throughout her body.  She states that this has been present for approximately 2 weeks.  She denies any rashes.  She denies any changes to soaps lotions or detergents.  She does state that she recently returned from [place].  She is currently taking loratadine on a daily basis.  Patient also complains of generalized fatigue.  She states that she is sleeping throughout the night but occasionally wakes up through the night and has a difficult time going back to sleep.  She is using her CPAP nightly.  I did explain to the patient that we would look at her labs to further evaluate her fatigue.  I did also go over her medications on her med list that could cause fatigue as well.    Itching       Objective     Vitals:    07/28/25 1126   BP: 118/72   BP Location: Right arm   Patient Position: Sitting   Cuff Size: Adult   Pulse: 67   Temp: 98.6 °F (37 °C)   TempSrc: Temporal   SpO2: 96%   Weight: 96.5 kg (212 lb 12.8 oz)   Height: 167.6 cm (66\")      Body mass index is 34.35 kg/m².    BMI is >= 30 and <35. (Class 1 Obesity). The following options were offered after discussion;: nutrition counseling/recommendations        Physical Exam  Vitals reviewed.   Constitutional:       Appearance: Normal appearance.   Cardiovascular:      Rate and Rhythm: Normal rate and regular rhythm.      Pulses: Normal pulses.      Heart sounds: Normal heart sounds, S1 normal and S2 normal. No murmur heard.  Pulmonary:      Effort: Pulmonary effort is normal. No respiratory distress.      Breath sounds: Normal breath sounds.   Skin:     General: Skin is warm and dry.   Neurological:      Mental Status: She is alert and oriented to person, place, and time.   Psychiatric:         Attention and Perception: Attention normal.         Mood and Affect: Mood normal.         " Behavior: Behavior normal.          Result Review :   The following data was reviewed by: FERNY Martin on 07/28/2025:      Procedures    Assessment and Plan   Diagnoses and all orders for this visit:    1. Fatigue, unspecified type (Primary)  -     Vitamin B12; Future  -     Iron and TIBC; Future  -     TSH; Future  -     Magnesium; Future  -     Comprehensive metabolic panel; Future  -     CBC No Differential; Future    2. Pruritus  -     Vitamin B12; Future  -     Iron and TIBC; Future  -     TSH; Future  -     Magnesium; Future  -     Comprehensive metabolic panel; Future  -     CBC No Differential; Future    Explained to the patient that we would reach out to her once we receive the results of her lab work.  She does complain of left knee pain.  She has seen orthopedics for this and they had advised her that she needs to have a knee replacement.  Patient states that she is trying to put this off as long as possible.  I did explain that medication will only mask the symptoms.  Patient verbalizes understanding.      Follow Up   Return if symptoms worsen or fail to improve, for Next scheduled follow up.  Patient was given instructions and counseling regarding her condition or for health maintenance advice. Please see specific information pulled into the AVS if appropriate.

## 2025-07-29 ENCOUNTER — TELEPHONE (OUTPATIENT)
Dept: FAMILY MEDICINE CLINIC | Facility: CLINIC | Age: 65
End: 2025-07-29
Payer: OTHER GOVERNMENT

## 2025-07-29 DIAGNOSIS — M25.562 CHRONIC PAIN OF LEFT KNEE: Primary | ICD-10-CM

## 2025-07-29 DIAGNOSIS — G89.29 CHRONIC PAIN OF LEFT KNEE: Primary | ICD-10-CM

## 2025-07-29 NOTE — TELEPHONE ENCOUNTER
Called and spoke with Judy, advised per Milton, I have sent the topical medication to the [facility] for her Judy verbalized understanding

## 2025-07-29 NOTE — TELEPHONE ENCOUNTER
Per patient in the past she received a compound through the mail for her knee pain. Patient is requesting to know if she can be prescribed this again.    Per patient she is currently using diclofenac gel. But is concerned about her usage of it. She thinks she is having to use to much of it.

## 2025-08-01 ENCOUNTER — TELEPHONE (OUTPATIENT)
Dept: FAMILY MEDICINE CLINIC | Facility: CLINIC | Age: 65
End: 2025-08-01
Payer: OTHER GOVERNMENT

## 2025-08-01 RX ORDER — PREDNISONE 20 MG/1
40 TABLET ORAL DAILY
Qty: 10 TABLET | Refills: 0 | Status: SHIPPED | OUTPATIENT
Start: 2025-08-01 | End: 2025-08-06

## 2025-08-01 NOTE — TELEPHONE ENCOUNTER
Patient states she has gotten more congested and has had to take an extra breathing treatment, would like a steroid sent into pharmacy

## 2025-08-01 NOTE — TELEPHONE ENCOUNTER
Caller: Judy Ramey    Relationship: Self    Best call back number: [phone]     What medication are you requesting: STEROID     What are your current symptoms: KNEES AND ITCHY     If a prescription is needed, what is your preferred pharmacy and phone number: [facility] - [address] [phone] [facility] [phone] [facility]     Additional notes: PLEASE CALL AND ADVISE

## 2025-08-05 ENCOUNTER — HOSPITAL ENCOUNTER (EMERGENCY)
Facility: HOSPITAL | Age: 65
Discharge: LEFT BEFORE DISCHARGE | End: 2025-08-05
Attending: EMERGENCY MEDICINE
Payer: OTHER GOVERNMENT

## 2025-08-05 VITALS
WEIGHT: 216.71 LBS | OXYGEN SATURATION: 96 % | RESPIRATION RATE: 20 BRPM | HEART RATE: 63 BPM | BODY MASS INDEX: 34.83 KG/M2 | HEIGHT: 66 IN | DIASTOLIC BLOOD PRESSURE: 54 MMHG | SYSTOLIC BLOOD PRESSURE: 128 MMHG

## 2025-08-05 DIAGNOSIS — M54.50 ACUTE LOW BACK PAIN WITHOUT SCIATICA, UNSPECIFIED BACK PAIN LATERALITY: ICD-10-CM

## 2025-08-05 PROCEDURE — 99211 OFF/OP EST MAY X REQ PHY/QHP: CPT | Performed by: EMERGENCY MEDICINE

## 2025-08-05 RX ORDER — TRAMADOL HYDROCHLORIDE 50 MG/1
50 TABLET ORAL EVERY 6 HOURS PRN
Qty: 30 TABLET | Refills: 0 | Status: SHIPPED | OUTPATIENT
Start: 2025-08-05

## 2025-08-07 ENCOUNTER — APPOINTMENT (OUTPATIENT)
Dept: GENERAL RADIOLOGY | Facility: HOSPITAL | Age: 65
End: 2025-08-07
Payer: OTHER GOVERNMENT

## 2025-08-07 ENCOUNTER — HOSPITAL ENCOUNTER (EMERGENCY)
Facility: HOSPITAL | Age: 65
Discharge: LEFT BEFORE DISCHARGE | End: 2025-08-08
Attending: EMERGENCY MEDICINE
Payer: OTHER GOVERNMENT

## 2025-08-07 ENCOUNTER — OFFICE VISIT (OUTPATIENT)
Dept: FAMILY MEDICINE CLINIC | Facility: CLINIC | Age: 65
End: 2025-08-07
Payer: OTHER GOVERNMENT

## 2025-08-07 VITALS
DIASTOLIC BLOOD PRESSURE: 74 MMHG | HEIGHT: 66 IN | SYSTOLIC BLOOD PRESSURE: 132 MMHG | OXYGEN SATURATION: 98 % | HEART RATE: 54 BPM | WEIGHT: 215 LBS | TEMPERATURE: 96.3 F | BODY MASS INDEX: 34.55 KG/M2 | RESPIRATION RATE: 16 BRPM

## 2025-08-07 DIAGNOSIS — R09.81 SINUS CONGESTION: Primary | ICD-10-CM

## 2025-08-07 DIAGNOSIS — J45.50 SEVERE PERSISTENT ASTHMA WITHOUT COMPLICATION: ICD-10-CM

## 2025-08-07 LAB
ALBUMIN SERPL-MCNC: 4.1 G/DL (ref 3.5–5.2)
ALBUMIN/GLOB SERPL: 1.5 G/DL
ALP SERPL-CCNC: 139 U/L (ref 39–117)
ALT SERPL W P-5'-P-CCNC: 15 U/L (ref 1–33)
ANION GAP SERPL CALCULATED.3IONS-SCNC: 10.5 MMOL/L (ref 5–15)
AST SERPL-CCNC: 14 U/L (ref 1–32)
BASOPHILS # BLD AUTO: 0.06 10*3/MM3 (ref 0–0.2)
BASOPHILS NFR BLD AUTO: 0.8 % (ref 0–1.5)
BILIRUB SERPL-MCNC: 0.2 MG/DL (ref 0–1.2)
BUN SERPL-MCNC: 17.9 MG/DL (ref 8–23)
BUN/CREAT SERPL: 14.8 (ref 7–25)
CALCIUM SPEC-SCNC: 9 MG/DL (ref 8.6–10.5)
CHLORIDE SERPL-SCNC: 102 MMOL/L (ref 98–107)
CO2 SERPL-SCNC: 27.5 MMOL/L (ref 22–29)
CREAT SERPL-MCNC: 1.21 MG/DL (ref 0.57–1)
DEPRECATED RDW RBC AUTO: 42.1 FL (ref 37–54)
EGFRCR SERPLBLD CKD-EPI 2021: 50.2 ML/MIN/1.73
EOSINOPHIL # BLD AUTO: 0.11 10*3/MM3 (ref 0–0.4)
EOSINOPHIL NFR BLD AUTO: 1.4 % (ref 0.3–6.2)
ERYTHROCYTE [DISTWIDTH] IN BLOOD BY AUTOMATED COUNT: 13.2 % (ref 12.3–15.4)
GLOBULIN UR ELPH-MCNC: 2.7 GM/DL
GLUCOSE SERPL-MCNC: 112 MG/DL (ref 65–99)
HCT VFR BLD AUTO: 37.3 % (ref 34–46.6)
HGB BLD-MCNC: 12.3 G/DL (ref 12–15.9)
HOLD SPECIMEN: NORMAL
HOLD SPECIMEN: NORMAL
IMM GRANULOCYTES # BLD AUTO: 0.04 10*3/MM3 (ref 0–0.05)
IMM GRANULOCYTES NFR BLD AUTO: 0.5 % (ref 0–0.5)
LIPASE SERPL-CCNC: 36 U/L (ref 13–60)
LYMPHOCYTES # BLD AUTO: 2.79 10*3/MM3 (ref 0.7–3.1)
LYMPHOCYTES NFR BLD AUTO: 35.5 % (ref 19.6–45.3)
MAGNESIUM SERPL-MCNC: 2 MG/DL (ref 1.6–2.4)
MCH RBC QN AUTO: 28.9 PG (ref 26.6–33)
MCHC RBC AUTO-ENTMCNC: 33 G/DL (ref 31.5–35.7)
MCV RBC AUTO: 87.8 FL (ref 79–97)
MONOCYTES # BLD AUTO: 0.5 10*3/MM3 (ref 0.1–0.9)
MONOCYTES NFR BLD AUTO: 6.4 % (ref 5–12)
NEUTROPHILS NFR BLD AUTO: 4.37 10*3/MM3 (ref 1.7–7)
NEUTROPHILS NFR BLD AUTO: 55.4 % (ref 42.7–76)
NRBC BLD AUTO-RTO: 0 /100 WBC (ref 0–0.2)
NT-PROBNP SERPL-MCNC: 377.1 PG/ML (ref 0–900)
PLATELET # BLD AUTO: 204 10*3/MM3 (ref 140–450)
PMV BLD AUTO: 9.9 FL (ref 6–12)
POTASSIUM SERPL-SCNC: 3.9 MMOL/L (ref 3.5–5.2)
PROT SERPL-MCNC: 6.8 G/DL (ref 6–8.5)
RBC # BLD AUTO: 4.25 10*6/MM3 (ref 3.77–5.28)
SODIUM SERPL-SCNC: 140 MMOL/L (ref 136–145)
TROPONIN T SERPL HS-MCNC: <6 NG/L
WBC NRBC COR # BLD AUTO: 7.87 10*3/MM3 (ref 3.4–10.8)
WHOLE BLOOD HOLD COAG: NORMAL
WHOLE BLOOD HOLD SPECIMEN: NORMAL

## 2025-08-07 PROCEDURE — 99213 OFFICE O/P EST LOW 20 MIN: CPT | Performed by: STUDENT IN AN ORGANIZED HEALTH CARE EDUCATION/TRAINING PROGRAM

## 2025-08-07 PROCEDURE — 99284 EMERGENCY DEPT VISIT MOD MDM: CPT

## 2025-08-07 PROCEDURE — 83735 ASSAY OF MAGNESIUM: CPT

## 2025-08-07 PROCEDURE — 84484 ASSAY OF TROPONIN QUANT: CPT

## 2025-08-07 PROCEDURE — 99211 OFF/OP EST MAY X REQ PHY/QHP: CPT | Performed by: EMERGENCY MEDICINE

## 2025-08-07 PROCEDURE — 85025 COMPLETE CBC W/AUTO DIFF WBC: CPT

## 2025-08-07 PROCEDURE — 83880 ASSAY OF NATRIURETIC PEPTIDE: CPT

## 2025-08-07 PROCEDURE — 93005 ELECTROCARDIOGRAM TRACING: CPT | Performed by: EMERGENCY MEDICINE

## 2025-08-07 PROCEDURE — 80053 COMPREHEN METABOLIC PANEL: CPT

## 2025-08-07 PROCEDURE — 99211 OFF/OP EST MAY X REQ PHY/QHP: CPT

## 2025-08-07 PROCEDURE — 83690 ASSAY OF LIPASE: CPT

## 2025-08-07 PROCEDURE — 36415 COLL VENOUS BLD VENIPUNCTURE: CPT

## 2025-08-07 PROCEDURE — 93010 ELECTROCARDIOGRAM REPORT: CPT | Performed by: INTERNAL MEDICINE

## 2025-08-07 PROCEDURE — 71045 X-RAY EXAM CHEST 1 VIEW: CPT

## 2025-08-07 PROCEDURE — 93005 ELECTROCARDIOGRAM TRACING: CPT

## 2025-08-07 RX ORDER — ASPIRIN 81 MG/1
324 TABLET, CHEWABLE ORAL ONCE
Status: DISCONTINUED | OUTPATIENT
Start: 2025-08-07 | End: 2025-08-08 | Stop reason: HOSPADM

## 2025-08-07 RX ORDER — ALBUTEROL SULFATE 90 UG/1
2 INHALANT RESPIRATORY (INHALATION) EVERY 4 HOURS PRN
Qty: 54 G | Refills: 4 | Status: SHIPPED | OUTPATIENT
Start: 2025-08-07

## 2025-08-07 RX ORDER — AZITHROMYCIN 250 MG/1
TABLET, FILM COATED ORAL
Qty: 6 TABLET | Refills: 0 | Status: SHIPPED | OUTPATIENT
Start: 2025-08-07 | End: 2025-08-08

## 2025-08-07 RX ORDER — FLUTICASONE PROPIONATE AND SALMETEROL XINAFOATE 230; 21 UG/1; UG/1
2 AEROSOL, METERED RESPIRATORY (INHALATION) 2 TIMES DAILY
Qty: 36 G | Refills: 3 | Status: SHIPPED | OUTPATIENT
Start: 2025-08-07

## 2025-08-07 RX ORDER — SODIUM CHLORIDE 0.9 % (FLUSH) 0.9 %
10 SYRINGE (ML) INJECTION AS NEEDED
Status: DISCONTINUED | OUTPATIENT
Start: 2025-08-07 | End: 2025-08-08 | Stop reason: HOSPADM

## 2025-08-07 RX ORDER — IPRATROPIUM BROMIDE AND ALBUTEROL SULFATE 2.5; .5 MG/3ML; MG/3ML
3 SOLUTION RESPIRATORY (INHALATION)
Qty: 360 ML | Refills: 11 | Status: SHIPPED | OUTPATIENT
Start: 2025-08-07

## 2025-08-08 ENCOUNTER — OFFICE VISIT (OUTPATIENT)
Dept: FAMILY MEDICINE CLINIC | Facility: CLINIC | Age: 65
End: 2025-08-08
Payer: OTHER GOVERNMENT

## 2025-08-08 VITALS
HEART RATE: 75 BPM | WEIGHT: 213.2 LBS | OXYGEN SATURATION: 97 % | SYSTOLIC BLOOD PRESSURE: 120 MMHG | HEIGHT: 66 IN | DIASTOLIC BLOOD PRESSURE: 70 MMHG | BODY MASS INDEX: 34.27 KG/M2 | RESPIRATION RATE: 16 BRPM | TEMPERATURE: 97.9 F

## 2025-08-08 VITALS
HEIGHT: 66 IN | DIASTOLIC BLOOD PRESSURE: 72 MMHG | OXYGEN SATURATION: 99 % | SYSTOLIC BLOOD PRESSURE: 149 MMHG | RESPIRATION RATE: 18 BRPM | BODY MASS INDEX: 34.79 KG/M2 | TEMPERATURE: 98.2 F | WEIGHT: 216.49 LBS | HEART RATE: 59 BPM

## 2025-08-08 DIAGNOSIS — K21.9 GASTROESOPHAGEAL REFLUX DISEASE WITHOUT ESOPHAGITIS: ICD-10-CM

## 2025-08-08 LAB
QT INTERVAL: 428 MS
QTC INTERVAL: 463 MS

## 2025-08-08 PROCEDURE — 99213 OFFICE O/P EST LOW 20 MIN: CPT | Performed by: NURSE PRACTITIONER

## 2025-08-08 RX ORDER — ESOMEPRAZOLE MAGNESIUM 40 MG/1
40 CAPSULE, DELAYED RELEASE ORAL
Qty: 90 CAPSULE | Refills: 1 | Status: SHIPPED | OUTPATIENT
Start: 2025-08-08

## 2025-08-22 ENCOUNTER — HOSPITAL ENCOUNTER (OUTPATIENT)
Dept: GENERAL RADIOLOGY | Facility: HOSPITAL | Age: 65
Discharge: HOME | End: 2025-08-22
Payer: OTHER GOVERNMENT

## 2025-08-22 ENCOUNTER — TELEPHONE (OUTPATIENT)
Dept: FAMILY MEDICINE CLINIC | Facility: CLINIC | Age: 65
End: 2025-08-22

## 2025-08-22 ENCOUNTER — OFFICE VISIT (OUTPATIENT)
Dept: FAMILY MEDICINE CLINIC | Facility: CLINIC | Age: 65
End: 2025-08-22
Payer: OTHER GOVERNMENT

## 2025-08-22 VITALS
HEART RATE: 70 BPM | HEIGHT: 66 IN | OXYGEN SATURATION: 98 % | DIASTOLIC BLOOD PRESSURE: 68 MMHG | SYSTOLIC BLOOD PRESSURE: 128 MMHG | BODY MASS INDEX: 34.36 KG/M2 | WEIGHT: 213.8 LBS | TEMPERATURE: 98.2 F

## 2025-08-22 DIAGNOSIS — R09.81 SINUS CONGESTION: ICD-10-CM

## 2025-08-22 DIAGNOSIS — M25.532 LEFT WRIST PAIN: Primary | ICD-10-CM

## 2025-08-22 DIAGNOSIS — M25.532 LEFT WRIST PAIN: ICD-10-CM

## 2025-08-22 DIAGNOSIS — B35.1 ONYCHOMYCOSIS: ICD-10-CM

## 2025-08-22 DIAGNOSIS — J45.50 SEVERE PERSISTENT ASTHMA WITHOUT COMPLICATION: ICD-10-CM

## 2025-08-22 PROCEDURE — 99214 OFFICE O/P EST MOD 30 MIN: CPT | Performed by: NURSE PRACTITIONER

## 2025-08-22 PROCEDURE — 73110 X-RAY EXAM OF WRIST: CPT

## 2025-08-22 PROCEDURE — 71046 X-RAY EXAM CHEST 2 VIEWS: CPT

## 2025-08-22 RX ORDER — TERBINAFINE HYDROCHLORIDE 250 MG/1
250 TABLET ORAL DAILY
Qty: 30 TABLET | Refills: 2 | Status: SHIPPED | OUTPATIENT
Start: 2025-08-22

## (undated) DEVICE — SOL IRRG H2O PL/BG 1000ML STRL

## (undated) DEVICE — SINGLE-USE BIOPSY FORCEPS: Brand: RADIAL JAW 4

## (undated) DEVICE — EGD OR ERCP KIT: Brand: MEDLINE INDUSTRIES, INC.

## (undated) DEVICE — Device: Brand: DEFENDO AIR/WATER/SUCTION AND BIOPSY VALVE